# Patient Record
Sex: MALE | Race: WHITE | NOT HISPANIC OR LATINO | Employment: OTHER | URBAN - METROPOLITAN AREA
[De-identification: names, ages, dates, MRNs, and addresses within clinical notes are randomized per-mention and may not be internally consistent; named-entity substitution may affect disease eponyms.]

---

## 2017-03-20 RX ORDER — NEBIVOLOL HYDROCHLORIDE 5 MG/1
TABLET ORAL
Qty: 90 TABLET | Refills: 0 | OUTPATIENT
Start: 2017-03-20

## 2017-04-03 ENCOUNTER — OFFICE VISIT (OUTPATIENT)
Dept: FAMILY MEDICINE CLINIC | Facility: CLINIC | Age: 62
End: 2017-04-03

## 2017-04-03 VITALS
RESPIRATION RATE: 16 BRPM | BODY MASS INDEX: 28.95 KG/M2 | SYSTOLIC BLOOD PRESSURE: 140 MMHG | DIASTOLIC BLOOD PRESSURE: 82 MMHG | HEIGHT: 68 IN | TEMPERATURE: 98.1 F | WEIGHT: 191 LBS | HEART RATE: 58 BPM

## 2017-04-03 DIAGNOSIS — L30.8 OTHER ECZEMA: ICD-10-CM

## 2017-04-03 DIAGNOSIS — Z13.9 SCREENING: ICD-10-CM

## 2017-04-03 DIAGNOSIS — E78.2 MIXED HYPERLIPIDEMIA: ICD-10-CM

## 2017-04-03 DIAGNOSIS — I10 ESSENTIAL HYPERTENSION: Primary | ICD-10-CM

## 2017-04-03 PROCEDURE — 99214 OFFICE O/P EST MOD 30 MIN: CPT | Performed by: INTERNAL MEDICINE

## 2017-04-03 RX ORDER — NEBIVOLOL 5 MG/1
5 TABLET ORAL DAILY
Qty: 90 TABLET | Refills: 1 | Status: SHIPPED | OUTPATIENT
Start: 2017-04-03 | End: 2017-09-30 | Stop reason: SDUPTHER

## 2017-04-03 RX ORDER — GABAPENTIN 300 MG/1
300 CAPSULE ORAL DAILY
Qty: 90 CAPSULE | Refills: 1 | Status: SHIPPED | OUTPATIENT
Start: 2017-04-03 | End: 2018-11-19 | Stop reason: SDUPTHER

## 2017-04-03 RX ORDER — CLOBETASOL PROPIONATE 0.5 MG/ML
LOTION TOPICAL 2 TIMES DAILY
Qty: 118 G | Refills: 2 | Status: SHIPPED | OUTPATIENT
Start: 2017-04-03 | End: 2017-06-28

## 2017-04-03 NOTE — PROGRESS NOTES
Subjective  chief complaint is follow-up for hypertension.  Gelacio Cunningham is a 61 y.o. male.     History of Present Illness   Gelacio is here today for follow-up on his hypertension.  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  Also since his last visit his rheumatology has reduced his medications.  He is feeling well.  He is not having any headaches, dizziness, chest pain, or shortness of breath.  He is complaining of some itching across his upper chest and shoulders.  He has since tried some over-the-counter antiaging medication which has not seemed to help.  This been going on for several weeks.  The following portions of the patient's history were reviewed and updated as appropriate: allergies, current medications, past medical history and problem list.    Review of Systems   Respiratory: Negative for chest tightness and shortness of breath.    Cardiovascular: Negative for chest pain and leg swelling.   Neurological: Negative for dizziness, light-headedness and headaches.       Objective   Physical Exam   Constitutional: He appears well-developed and well-nourished.   Neck: Carotid bruit is not present. No thyromegaly present.   Cardiovascular: Normal rate, regular rhythm, normal heart sounds and intact distal pulses.  Exam reveals no gallop and no friction rub.    No murmur heard.  Blood pressure to my exam is 140/76   Pulmonary/Chest: Effort normal and breath sounds normal. No respiratory distress. He has no wheezes. He has no rales.   Abdominal: Soft. Bowel sounds are normal. He exhibits no distension. There is no tenderness. There is no rebound and no guarding.   Musculoskeletal: He exhibits no edema.   Skin:   There appears to be some lichenoid looking lesions on his shoulders and chest.  This may be lichenified eczema versus lichen planus.  I am going to give him some clobetasol lotion to see if that will help.   Nursing note and vitals reviewed.      Assessment/Plan   Gelacio was seen today for  med management.    Diagnoses and all orders for this visit:    Essential hypertension    Other eczema    Screening  -     PSA    Mixed hyperlipidemia  -     Lipid Panel    Other orders  -     gabapentin (NEURONTIN) 300 MG capsule; Take 1 capsule by mouth Daily.  -     nebivolol (BYSTOLIC) 5 MG tablet; Take 1 tablet by mouth Daily.  -     clobetasol (CLOBEX) 0.05 % lotion; Apply  topically 2 (Two) Times a Day.     Gelacio is here today for follow-up.  His blood pressure seems to be doing well.  I did prescribe some clobetasol for his eczema type reaction.  I am going to check some lab work to look his cholesterol and PSA.

## 2017-04-04 LAB
CHOLEST SERPL-MCNC: 203 MG/DL (ref 0–200)
HDLC SERPL-MCNC: 66 MG/DL (ref 40–60)
INTERPRETATION: NORMAL
LDLC SERPL CALC-MCNC: 115 MG/DL (ref 0–100)
Lab: NORMAL
PSA SERPL-MCNC: 0.49 NG/ML (ref 0–4)
TRIGL SERPL-MCNC: 110 MG/DL (ref 0–150)
VLDLC SERPL CALC-MCNC: 22 MG/DL (ref 5–40)

## 2017-06-28 ENCOUNTER — OFFICE VISIT (OUTPATIENT)
Dept: FAMILY MEDICINE CLINIC | Facility: CLINIC | Age: 62
End: 2017-06-28

## 2017-06-28 VITALS
WEIGHT: 183 LBS | DIASTOLIC BLOOD PRESSURE: 80 MMHG | SYSTOLIC BLOOD PRESSURE: 122 MMHG | BODY MASS INDEX: 27.74 KG/M2 | TEMPERATURE: 98 F | OXYGEN SATURATION: 99 % | HEART RATE: 75 BPM | HEIGHT: 68 IN

## 2017-06-28 DIAGNOSIS — J01.90 ACUTE SINUSITIS, RECURRENCE NOT SPECIFIED, UNSPECIFIED LOCATION: Primary | ICD-10-CM

## 2017-06-28 DIAGNOSIS — C44.91 BASAL CELL CARCINOMA: ICD-10-CM

## 2017-06-28 PROCEDURE — 99214 OFFICE O/P EST MOD 30 MIN: CPT | Performed by: INTERNAL MEDICINE

## 2017-06-28 RX ORDER — MONTELUKAST SODIUM 10 MG/1
10 TABLET ORAL NIGHTLY
Qty: 30 TABLET | Refills: 1 | Status: SHIPPED | OUTPATIENT
Start: 2017-06-28 | End: 2019-06-13

## 2017-06-28 RX ORDER — DOXYCYCLINE HYCLATE 100 MG/1
100 CAPSULE ORAL 2 TIMES DAILY
Qty: 20 CAPSULE | Refills: 0 | Status: SHIPPED | OUTPATIENT
Start: 2017-06-28 | End: 2018-07-31

## 2017-06-28 RX ORDER — FLUNISOLIDE 0.25 MG/ML
2 SOLUTION NASAL EVERY 12 HOURS
Qty: 25 ML | Refills: 1 | Status: SHIPPED | OUTPATIENT
Start: 2017-06-28 | End: 2019-06-13

## 2017-06-28 RX ORDER — MULTIVITAMIN WITH IRON
100 TABLET ORAL DAILY
COMMUNITY
End: 2021-05-28

## 2017-06-28 NOTE — PROGRESS NOTES
Subjective chief complaint is nasal congestion  Gelacio Cunningham is a 62 y.o. male.     History of Present Illness   Gelacio is here today for one-week history of moderate to severe nasal congestion and cough and chills.  He has not had any fever associated with the chills.  His cough is productive of yellow or green sputum.   Past medical history is remarkable for hypertension and rheumatoid arthritis.  He is no longer on immunosuppressive medications other than the Humira.  He is on bi-systolic for his hypertension.  The following portions of the patient's history were reviewed and updated as appropriate: allergies, current medications, past medical history and problem list.    Review of Systems   Constitutional: Positive for chills. Negative for fever.   HENT: Positive for congestion, postnasal drip, rhinorrhea, sinus pressure and sore throat. Negative for ear discharge and ear pain.    Respiratory: Positive for cough.        Objective   Physical Exam   HENT:   Tympanic membranes are normal.  There is bilateral nasal congestion and erythema.  There is posterior pharyngeal erythema but no exudate   Cardiovascular: Normal rate and regular rhythm.    Pulmonary/Chest: Effort normal and breath sounds normal.   Lymphadenopathy:     He has no cervical adenopathy.   Skin:   Ears to have a basal cell skin cancer on the tragus of the right ear       Assessment/Plan   Gelacio was seen today for nasal congestion, cough and chills.    Diagnoses and all orders for this visit:    Acute sinusitis, recurrence not specified, unspecified location    Basal cell carcinoma  -     Ambulatory Referral to Dermatology    Other orders  -     montelukast (SINGULAIR) 10 MG tablet; Take 1 tablet by mouth Every Night.  -     flunisolide (NASALIDE) 25 MCG/ACT (0.025%) solution nasal spray; Inhale 2 sprays Every 12 (Twelve) Hours.  -     doxycycline (VIBRAMYCIN) 100 MG capsule; Take 1 capsule by mouth 2 (Two) Times a Day.        Gelacio is here  today for evaluation of respiratory symptoms.  We are going to treat him for some sinusitis.  I am going to refer him to a dermatologist for his trouble basal cell.

## 2017-07-19 ENCOUNTER — APPOINTMENT (OUTPATIENT)
Dept: GENERAL RADIOLOGY | Facility: HOSPITAL | Age: 62
End: 2017-07-19

## 2017-07-19 ENCOUNTER — HOSPITAL ENCOUNTER (EMERGENCY)
Facility: HOSPITAL | Age: 62
Discharge: HOME | End: 2017-07-19
Attending: FAMILY MEDICINE | Admitting: FAMILY MEDICINE

## 2017-07-19 VITALS
HEIGHT: 68 IN | DIASTOLIC BLOOD PRESSURE: 78 MMHG | HEART RATE: 57 BPM | RESPIRATION RATE: 16 BRPM | OXYGEN SATURATION: 97 % | BODY MASS INDEX: 27.28 KG/M2 | TEMPERATURE: 96.5 F | WEIGHT: 180 LBS | SYSTOLIC BLOOD PRESSURE: 129 MMHG

## 2017-07-19 DIAGNOSIS — M70.50 PES ANSERINE BURSITIS: Primary | ICD-10-CM

## 2017-07-19 PROCEDURE — 73560 X-RAY EXAM OF KNEE 1 OR 2: CPT

## 2017-07-19 PROCEDURE — 99283 EMERGENCY DEPT VISIT LOW MDM: CPT

## 2017-07-19 RX ORDER — OXYCODONE HYDROCHLORIDE AND ACETAMINOPHEN 5; 325 MG/1; MG/1
1 TABLET ORAL EVERY 4 HOURS PRN
Qty: 15 TABLET | Refills: 0 | Status: SHIPPED | OUTPATIENT
Start: 2017-07-19 | End: 2018-07-31

## 2017-07-19 NOTE — ED PROVIDER NOTES
" EMERGENCY DEPARTMENT ENCOUNTER    CHIEF COMPLAINT  Chief Complaint: Right knee pain  History given by: Patient  History limited by: Nothing  Room Number: 14/14  PMD: Favio Guaman MD      HPI:  Pt is a 62 y.o. male who presents complaining of right knee pain onset last night. The pt states he started to have tightness in his right knee while sitting down last night. The pt states the pain started to worsen when he went to sleep. The pt states he has tried ice and heat, but neither have worked. The pt states the pain feels similar to his rheumatoid arthritis in his fingers. The pt's last dose of Humira was 4 days ago.    Duration: Since last night  Onset: Gradual  Timing: Constant  Location: Right knee  Radiation: None  Quality: \"Pain\"  Intensity/Severity: Moderate  Progression: Worsening  Associated Symptoms: None  Aggravating Factors: Nothing  Alleviating Factors: Nothing  Previous Episodes: None  Treatment before arrival: Nothing    PAST MEDICAL HISTORY  Active Ambulatory Problems     Diagnosis Date Noted   • Essential hypertension 09/23/2016   • Age-related cognitive decline 09/23/2016   • Encounter for screening colonoscopy 12/23/2016   • Mixed hyperlipidemia 04/03/2017     Resolved Ambulatory Problems     Diagnosis Date Noted   • No Resolved Ambulatory Problems     Past Medical History:   Diagnosis Date   • Arthritis    • Hypertension        PAST SURGICAL HISTORY  Past Surgical History:   Procedure Laterality Date   • COLONOSCOPY N/A 12/23/2016    Procedure: COLONOSCOPY TO CECUM WITH POLYPECTOMY (COLD BIOPSY);  Surgeon: Antolin Munoz Jr., MD;  Location: [facility];  Service:        FAMILY HISTORY  Family History   Problem Relation Age of Onset   • COPD Father    • Cancer Father    • Asthma Brother    • Diabetes Paternal Grandfather        SOCIAL HISTORY  Social History     Social History   • Marital status:      Spouse name: N/A   • Number of children: N/A   • Years of education: N/A "     Occupational History   • Not on file.     Social History Main Topics   • Smoking status: Former Smoker     Quit date: 12/23/2004   • Smokeless tobacco: Not on file   • Alcohol use No   • Drug use: No   • Sexual activity: Not on file     Other Topics Concern   • Not on file     Social History Narrative       ALLERGIES  Sulindac    REVIEW OF SYSTEMS  Review of Systems   Constitutional: Negative for chills and fever.   Respiratory: Negative for cough and shortness of breath.    Cardiovascular: Negative for chest pain and palpitations.   Gastrointestinal: Negative for abdominal pain, nausea and vomiting.   Genitourinary: Negative for dysuria.   Musculoskeletal: Negative for back pain and neck pain.        Right knee pain   Neurological: Negative for syncope, weakness and numbness.   All other systems reviewed and are negative.      PHYSICAL EXAM  ED Triage Vitals   Temp Heart Rate Resp BP SpO2   07/19/17 0456 07/19/17 0453 07/19/17 0453 07/19/17 0634 07/19/17 0453   97 °F (36.1 °C) 69 15 128/89 96 %      Temp src Heart Rate Source Patient Position BP Location FiO2 (%)   07/19/17 0456 07/19/17 0453 07/19/17 0634 07/19/17 0634 --   Tympanic Monitor Lying Right arm        Physical Exam   Constitutional: He is oriented to person, place, and time and well-developed, well-nourished, and in no distress.   HENT:   Head: Normocephalic and atraumatic.   Eyes: EOM are normal.   Neck: Normal range of motion.   Pulmonary/Chest: No respiratory distress.   Musculoskeletal: He exhibits tenderness (Over the proximal fibula).   Neurological: He is alert and oriented to person, place, and time. He has normal sensation and normal strength.   Skin: Skin is warm and dry.   Psychiatric: Affect normal.   Nursing note and vitals reviewed.      LAB RESULTS  Lab Results (last 24 hours)     ** No results found for the last 24 hours. **          I ordered the above labs and reviewed the results    RADIOLOGY  XR Knee 1 or 2 View Right   Final  Result   1. Mild pan compartmental osteoarthritis with spurring at the superior  anterior aspect of the patella.  2. Minimal relative medial compartmental narrowing.  3. No acute nor other significant osseous abnormality.  4. Vascular calcification.        I ordered the above noted radiological studies. Interpreted by radiologist. Reviewed by me in PACS.       PROCEDURES  Procedures      PROGRESS AND CONSULTS  ED Course     0514  XR Right Knee ordered for further evaluation.    0702  BP- 128/89 HR- 61 Temp- 97 °F (36.1 °C) (Tympanic) O2 sat- 95%    Rechecked pt, he is resting comfortably. Discussed the XR results with the pt including the bursa seen on the XR. Discussed the plan to discharge the pt with the plan to take Advil if the pt can tolerate it. Discussed the plan to discharge the pt with pain medication and to take it easy the next several days. I advised the pt to f/u with an orthopedist in 3-5 days if the pain does not improve. The pt understands and agrees with the plan.      MEDICAL DECISION MAKING  Results were reviewed/discussed with the patient and they were also made aware of online access. Pt also made aware that some labs, such as cultures, will not be resulted during ER visit and follow up with PMD is necessary.     MDM  Number of Diagnoses or Management Options  Pes anserine bursitis:      Amount and/or Complexity of Data Reviewed  Tests in the radiology section of CPT®: ordered and reviewed (XR Right Knee - 1. Mild pan compartmental osteoarthritis with spurring at the superior anterior aspect of the patella.  2. Minimal relative medial compartmental narrowing.  3. No acute nor other significant osseous abnormality.  4. Vascular calcification.)    Patient Progress  Patient progress: stable         DIAGNOSIS  Final diagnoses:   Pes anserine bursitis       DISPOSITION  DISCHARGE    Patient discharged in stable condition.    Reviewed implications of results, diagnosis, meds, responsibility to follow  up, warning signs and symptoms of possible worsening, potential complications and reasons to return to ER.    Patient/Family voiced understanding of above instructions.    Discussed plan for discharge, as there is no emergent indication for admission.  Pt/family is agreeable and understands need for follow up and repeat testing.  Pt is aware that discharge does not mean that nothing is wrong but it indicates no emergency is present that requires admission and they must continue care with follow-up as given below or physician of their choice.     FOLLOW-UP  Randolph Rashid MD  [address] [facility]  [address]  [place] [address]  [phone]      If not improving over 3-5 days, see Dr Rashid for a recheck in the office.         Medication List      New Prescriptions          oxyCODONE-acetaminophen 5-325 MG per tablet   Commonly known as:  PERCOCET   Take 1 tablet by mouth Every 4 (Four) Hours As Needed for Moderate Pain    (pain).         Stop          doxycycline 100 MG capsule   Commonly known as:  VIBRAMYCIN       flunisolide 25 MCG/ACT (0.025%) solution nasal spray   Commonly known as:  NASALIDE               Latest Documented Vital Signs:  As of 7:10 AM  BP- 128/89 HR- 61 Temp- 97 °F (36.1 °C) (Tympanic) O2 sat- 95%    --  Documentation assistance provided by robyn Juan for Dr. Ruiz.  Information recorded by the scribe was done at my direction and has been verified and validated by me.     Olaf Juan  07/19/17 5508       Mateo Ruiz MD  07/19/17 5656

## 2017-07-20 ENCOUNTER — TELEPHONE (OUTPATIENT)
Dept: SOCIAL WORK | Facility: HOSPITAL | Age: 62
End: 2017-07-20

## 2017-09-30 RX ORDER — NEBIVOLOL HYDROCHLORIDE 5 MG/1
TABLET ORAL
Qty: 90 TABLET | Refills: 1 | Status: SHIPPED | OUTPATIENT
Start: 2017-09-30 | End: 2018-05-09 | Stop reason: SDUPTHER

## 2018-05-09 RX ORDER — NEBIVOLOL HYDROCHLORIDE 5 MG/1
TABLET ORAL
Qty: 90 TABLET | Refills: 1 | Status: SHIPPED | OUTPATIENT
Start: 2018-05-09 | End: 2018-11-19 | Stop reason: SDUPTHER

## 2018-07-31 ENCOUNTER — TELEPHONE (OUTPATIENT)
Dept: FAMILY MEDICINE CLINIC | Facility: CLINIC | Age: 63
End: 2018-07-31

## 2018-08-07 ENCOUNTER — TELEPHONE (OUTPATIENT)
Dept: FAMILY MEDICINE CLINIC | Facility: CLINIC | Age: 63
End: 2018-08-07

## 2018-08-07 NOTE — TELEPHONE ENCOUNTER
Left message with wife about refill for gabapentin and said that Dr Guaman advised that he needs an appt before he can refill it.

## 2018-11-05 RX ORDER — NEBIVOLOL HYDROCHLORIDE 5 MG/1
TABLET ORAL
Qty: 90 TABLET | Refills: 1 | OUTPATIENT
Start: 2018-11-05

## 2018-11-19 ENCOUNTER — OFFICE VISIT (OUTPATIENT)
Dept: FAMILY MEDICINE CLINIC | Facility: CLINIC | Age: 63
End: 2018-11-19

## 2018-11-19 VITALS
SYSTOLIC BLOOD PRESSURE: 140 MMHG | TEMPERATURE: 98 F | DIASTOLIC BLOOD PRESSURE: 80 MMHG | HEART RATE: 78 BPM | WEIGHT: 189 LBS | BODY MASS INDEX: 28.64 KG/M2 | HEIGHT: 68 IN | OXYGEN SATURATION: 99 %

## 2018-11-19 DIAGNOSIS — R21 SCROTAL RASH: ICD-10-CM

## 2018-11-19 DIAGNOSIS — I10 ESSENTIAL HYPERTENSION: Primary | ICD-10-CM

## 2018-11-19 PROBLEM — M05.89 OTHER RHEUMATOID ARTHRITIS WITH RHEUMATOID FACTOR OF MULTIPLE SITES (HCC): Status: ACTIVE | Noted: 2018-05-17

## 2018-11-19 PROCEDURE — 99213 OFFICE O/P EST LOW 20 MIN: CPT | Performed by: INTERNAL MEDICINE

## 2018-11-19 RX ORDER — GABAPENTIN 300 MG/1
300 CAPSULE ORAL DAILY
Qty: 90 CAPSULE | Refills: 1 | Status: SHIPPED | OUTPATIENT
Start: 2018-11-19 | End: 2019-10-21 | Stop reason: SDUPTHER

## 2018-11-19 RX ORDER — NEBIVOLOL 5 MG/1
5 TABLET ORAL DAILY
Qty: 90 TABLET | Refills: 1 | Status: SHIPPED | OUTPATIENT
Start: 2018-11-19 | End: 2019-05-18 | Stop reason: SDUPTHER

## 2018-11-19 RX ORDER — CLOTRIMAZOLE AND BETAMETHASONE DIPROPIONATE 10; .64 MG/G; MG/G
CREAM TOPICAL 2 TIMES DAILY
Qty: 45 G | Refills: 1 | Status: SHIPPED | OUTPATIENT
Start: 2018-11-19 | End: 2019-02-05

## 2018-11-19 NOTE — PROGRESS NOTES
Subjective complaint is medication refills for what pressure and neuropathy  Gelacio Cunningham is a 63 y.o. male.     History of Present Illness   Gelacio is here today for medication refill on his Bystolic.  This has controlled his blood pressure fairly well.  He also is on some gabapentin for neuropathic pain.  He is only taking one daily.  He does need a refill on this.  The only other complaint is a rash on his scrotum.  This is been present for several weeks.  He has been using some Lotrimin which helps with itching but the rash is still present.  He did have a question about the new shingles vaccine.  The following portions of the patient's history were reviewed and updated as appropriate: allergies, current medications, past medical history and problem list.    Review of Systems   Respiratory: Negative for shortness of breath.    Cardiovascular: Negative for chest pain.   Skin: Positive for rash.   Neurological: Negative for dizziness and headache.       Objective   Physical Exam   Constitutional: He appears well-developed and well-nourished.   Neck: Carotid bruit is not present.   Cardiovascular: Normal rate, regular rhythm, normal heart sounds and intact distal pulses. Exam reveals no gallop and no friction rub.   No murmur heard.  Pulmonary/Chest: Effort normal and breath sounds normal. No respiratory distress. He has no wheezes. He has no rales.   Abdominal: Soft. Bowel sounds are normal. He exhibits no distension and no mass. There is no tenderness. There is no guarding.   Musculoskeletal: He exhibits no edema.   Skin:   There is some erythema and irritation of the scrotal side.  There does not really appear to be any significant scale.   Nursing note and vitals reviewed.        Assessment/Plan   Gelacio was seen today for med refill and rash.    Diagnoses and all orders for this visit:    Essential hypertension    Scrotal rash    Other orders  -     nebivolol (BYSTOLIC) 5 MG tablet; Take 1 tablet by mouth  Daily.  -     gabapentin (NEURONTIN) 300 MG capsule; Take 1 capsule by mouth Daily.  -     clotrimazole-betamethasone (LOTRISONE) 1-0.05 % cream; Apply  topically to the appropriate area as directed 2 (Two) Times a Day.      Gelacio is here today for follow-up.  We are going to renew his medications.  I did give him some Lotrisone for the rash.  He is going to check with his rheumatologist about the shingles vaccine because he is on immunosuppressive medication.

## 2019-01-23 ENCOUNTER — OFFICE VISIT (OUTPATIENT)
Dept: FAMILY MEDICINE CLINIC | Facility: CLINIC | Age: 64
End: 2019-01-23

## 2019-01-23 VITALS
SYSTOLIC BLOOD PRESSURE: 130 MMHG | BODY MASS INDEX: 29.1 KG/M2 | HEART RATE: 62 BPM | TEMPERATURE: 98.5 F | HEIGHT: 68 IN | WEIGHT: 192 LBS | OXYGEN SATURATION: 98 % | DIASTOLIC BLOOD PRESSURE: 98 MMHG

## 2019-01-23 DIAGNOSIS — R10.11 ABDOMINAL PAIN, RIGHT UPPER QUADRANT: Primary | ICD-10-CM

## 2019-01-23 LAB
ALBUMIN SERPL-MCNC: 4.4 G/DL (ref 3.5–5.2)
ALBUMIN/GLOB SERPL: 1.9 G/DL
ALP SERPL-CCNC: 141 U/L (ref 39–117)
ALT SERPL-CCNC: 59 U/L (ref 1–41)
AMYLASE SERPL-CCNC: 44 U/L (ref 28–100)
AST SERPL-CCNC: 50 U/L (ref 1–40)
BASOPHILS # BLD AUTO: 0.02 10*3/MM3 (ref 0–0.2)
BASOPHILS NFR BLD AUTO: 0.2 % (ref 0–1.5)
BILIRUB SERPL-MCNC: 0.6 MG/DL (ref 0.1–1.2)
BUN SERPL-MCNC: 14 MG/DL (ref 8–23)
BUN/CREAT SERPL: 14.9 (ref 7–25)
CALCIUM SERPL-MCNC: 9.4 MG/DL (ref 8.6–10.5)
CHLORIDE SERPL-SCNC: 98 MMOL/L (ref 98–107)
CO2 SERPL-SCNC: 28.2 MMOL/L (ref 22–29)
CREAT SERPL-MCNC: 0.94 MG/DL (ref 0.76–1.27)
EOSINOPHIL # BLD AUTO: 0.05 10*3/MM3 (ref 0–0.7)
EOSINOPHIL NFR BLD AUTO: 0.5 % (ref 0.3–6.2)
ERYTHROCYTE [DISTWIDTH] IN BLOOD BY AUTOMATED COUNT: 14 % (ref 11.5–14.5)
GLOBULIN SER CALC-MCNC: 2.3 GM/DL
GLUCOSE SERPL-MCNC: 88 MG/DL (ref 65–99)
HCT VFR BLD AUTO: 39.8 % (ref 40.4–52.2)
HGB BLD-MCNC: 12.9 G/DL (ref 13.7–17.6)
IMM GRANULOCYTES # BLD AUTO: 0.04 10*3/MM3 (ref 0–0.03)
IMM GRANULOCYTES NFR BLD AUTO: 0.4 % (ref 0–0.5)
LIPASE SERPL-CCNC: 21 U/L (ref 13–60)
LYMPHOCYTES # BLD AUTO: 1.05 10*3/MM3 (ref 0.9–4.8)
LYMPHOCYTES NFR BLD AUTO: 10.7 % (ref 19.6–45.3)
MCH RBC QN AUTO: 29.2 PG (ref 27–32.7)
MCHC RBC AUTO-ENTMCNC: 32.4 G/DL (ref 32.6–36.4)
MCV RBC AUTO: 90 FL (ref 79.8–96.2)
MONOCYTES # BLD AUTO: 0.86 10*3/MM3 (ref 0.2–1.2)
MONOCYTES NFR BLD AUTO: 8.8 % (ref 5–12)
NEUTROPHILS # BLD AUTO: 7.82 10*3/MM3 (ref 1.9–8.1)
NEUTROPHILS NFR BLD AUTO: 79.8 % (ref 42.7–76)
PLATELET # BLD AUTO: 332 10*3/MM3 (ref 140–500)
POTASSIUM SERPL-SCNC: 4.2 MMOL/L (ref 3.5–5.2)
PROT SERPL-MCNC: 6.7 G/DL (ref 6–8.5)
RBC # BLD AUTO: 4.42 10*6/MM3 (ref 4.6–6)
SODIUM SERPL-SCNC: 138 MMOL/L (ref 136–145)
WBC # BLD AUTO: 9.8 10*3/MM3 (ref 4.5–10.7)

## 2019-01-23 PROCEDURE — 99214 OFFICE O/P EST MOD 30 MIN: CPT | Performed by: INTERNAL MEDICINE

## 2019-01-23 RX ORDER — OMEPRAZOLE 40 MG/1
40 CAPSULE, DELAYED RELEASE ORAL DAILY
Qty: 30 CAPSULE | Refills: 1 | Status: SHIPPED | OUTPATIENT
Start: 2019-01-23 | End: 2019-01-24

## 2019-01-23 NOTE — PROGRESS NOTES
Subjective chief complaint is right sided abdominal pain  Gelacio Cunningham is a 63 y.o. male.     History of Present Illness   Gelacio is here today for complaints of pain in the right upper quadrant of his abdomen.  This began several days ago as a sense of feeling full or that his stomach was not emptying..  He has been using some Tums and antacids but these  are not helping.  Today the right upper quadrant pain became more severe.  He is not having fever or chills.  He is not having any blood in his bowel movements or dark bowel movements.  He has no prior history of ulcer disease.  He is not on any anti-inflammatories.  The following portions of the patient's history were reviewed and updated as appropriate: allergies, current medications, past medical history and problem list.    Review of Systems   Constitutional: Negative for chills and fever.   Respiratory: Negative for cough, chest tightness and shortness of breath.    Cardiovascular: Negative for chest pain.   Gastrointestinal: Positive for abdominal pain and nausea. Negative for blood in stool and vomiting.       Objective   Physical Exam   Constitutional: He appears well-developed and well-nourished.   Cardiovascular: Normal rate, regular rhythm and normal heart sounds.   Pulmonary/Chest: Breath sounds normal. No respiratory distress. He has no wheezes. He has no rales.   He does have some pain in the right upper quadrant with deep inspiration.   Abdominal: Soft. Bowel sounds are normal. He exhibits no distension and no mass. There is tenderness. There is guarding. There is no rebound.   He is tender in the right upper quadrant with a positive Cunningham's sign.   Nursing note and vitals reviewed.        Assessment/Plan   Gelacio was seen today for abdominal pain.    Diagnoses and all orders for this visit:    Abdominal pain, right upper quadrant  -     CBC & Differential  -     Comprehensive Metabolic Panel  -     Amylase  -     Lipase  -     US Gallbladder;  Future  -     NM HIDA scan with pharmacological intervention; Future    Other orders  -     omeprazole (PRILOSEC) 40 MG capsule; Take 1 capsule by mouth Daily.      Gelacio is here today for right upper quadrant pain.  I suspect this is going to be a gallbladder issue.  I am going to get a CBC chemistry panel amylase and lipase.  I'm going to put him on some omeprazole.  I have advised him to go to a clear liquid diet for the next 48 hours.  I'm going to try to get a fairly expeditious ultrasound.  However if his symptoms worsen he may need to go to the emergency room.  I did advise him to do this.

## 2019-01-24 ENCOUNTER — TELEPHONE (OUTPATIENT)
Dept: FAMILY MEDICINE CLINIC | Facility: CLINIC | Age: 64
End: 2019-01-24

## 2019-01-24 ENCOUNTER — APPOINTMENT (OUTPATIENT)
Dept: CT IMAGING | Facility: HOSPITAL | Age: 64
End: 2019-01-24

## 2019-01-24 ENCOUNTER — HOSPITAL ENCOUNTER (OUTPATIENT)
Dept: ULTRASOUND IMAGING | Facility: HOSPITAL | Age: 64
Discharge: HOME | End: 2019-01-24
Attending: INTERNAL MEDICINE | Admitting: INTERNAL MEDICINE

## 2019-01-24 ENCOUNTER — HOSPITAL ENCOUNTER (INPATIENT)
Facility: HOSPITAL | Age: 64
LOS: 5 days | Discharge: HOME | End: 2019-01-29
Attending: EMERGENCY MEDICINE | Admitting: HOSPITALIST

## 2019-01-24 DIAGNOSIS — Z85.820 HISTORY OF MELANOMA: ICD-10-CM

## 2019-01-24 DIAGNOSIS — C80.1: Primary | ICD-10-CM

## 2019-01-24 DIAGNOSIS — R16.0 LIVER MASSES: ICD-10-CM

## 2019-01-24 DIAGNOSIS — R10.11 ABDOMINAL PAIN, RIGHT UPPER QUADRANT: ICD-10-CM

## 2019-01-24 DIAGNOSIS — R10.11 RIGHT UPPER QUADRANT PAIN: ICD-10-CM

## 2019-01-24 LAB
ALBUMIN SERPL-MCNC: 4.2 G/DL (ref 3.5–5.2)
ALBUMIN/GLOB SERPL: 1.1 G/DL
ALP SERPL-CCNC: 162 U/L (ref 39–117)
ALT SERPL W P-5'-P-CCNC: 66 U/L (ref 1–41)
ANION GAP SERPL CALCULATED.3IONS-SCNC: 12.4 MMOL/L
AST SERPL-CCNC: 49 U/L (ref 1–40)
BASOPHILS # BLD AUTO: 0.01 10*3/MM3 (ref 0–0.2)
BASOPHILS NFR BLD AUTO: 0.1 % (ref 0–1.5)
BILIRUB SERPL-MCNC: 0.6 MG/DL (ref 0.1–1.2)
BILIRUB UR QL STRIP: NEGATIVE
BUN BLD-MCNC: 12 MG/DL (ref 8–23)
BUN/CREAT SERPL: 11.3 (ref 7–25)
CALCIUM SPEC-SCNC: 9.3 MG/DL (ref 8.6–10.5)
CHLORIDE SERPL-SCNC: 99 MMOL/L (ref 98–107)
CLARITY UR: CLEAR
CO2 SERPL-SCNC: 27.6 MMOL/L (ref 22–29)
COLOR UR: YELLOW
CREAT BLD-MCNC: 1.06 MG/DL (ref 0.76–1.27)
DEPRECATED RDW RBC AUTO: 45.3 FL (ref 37–54)
EOSINOPHIL # BLD AUTO: 0.16 10*3/MM3 (ref 0–0.7)
EOSINOPHIL NFR BLD AUTO: 1.8 % (ref 0.3–6.2)
ERYTHROCYTE [DISTWIDTH] IN BLOOD BY AUTOMATED COUNT: 13.9 % (ref 11.5–14.5)
GFR SERPL CREATININE-BSD FRML MDRD: 71 ML/MIN/1.73
GLOBULIN UR ELPH-MCNC: 3.7 GM/DL
GLUCOSE BLD-MCNC: 71 MG/DL (ref 65–99)
GLUCOSE UR STRIP-MCNC: NEGATIVE MG/DL
HCT VFR BLD AUTO: 42.6 % (ref 40.4–52.2)
HGB BLD-MCNC: 13.9 G/DL (ref 13.7–17.6)
HGB UR QL STRIP.AUTO: NEGATIVE
HOLD SPECIMEN: NORMAL
IMM GRANULOCYTES # BLD AUTO: 0.02 10*3/MM3 (ref 0–0.03)
IMM GRANULOCYTES NFR BLD AUTO: 0.2 % (ref 0–0.5)
KETONES UR QL STRIP: ABNORMAL
LEUKOCYTE ESTERASE UR QL STRIP.AUTO: NEGATIVE
LIPASE SERPL-CCNC: 20 U/L (ref 13–60)
LYMPHOCYTES # BLD AUTO: 1.56 10*3/MM3 (ref 0.9–4.8)
LYMPHOCYTES NFR BLD AUTO: 17.8 % (ref 19.6–45.3)
MCH RBC QN AUTO: 28.8 PG (ref 27–32.7)
MCHC RBC AUTO-ENTMCNC: 32.6 G/DL (ref 32.6–36.4)
MCV RBC AUTO: 88.4 FL (ref 79.8–96.2)
MONOCYTES # BLD AUTO: 1.2 10*3/MM3 (ref 0.2–1.2)
MONOCYTES NFR BLD AUTO: 13.7 % (ref 5–12)
NEUTROPHILS # BLD AUTO: 5.83 10*3/MM3 (ref 1.9–8.1)
NEUTROPHILS NFR BLD AUTO: 66.6 % (ref 42.7–76)
NITRITE UR QL STRIP: NEGATIVE
PH UR STRIP.AUTO: <=5 [PH] (ref 5–8)
PLATELET # BLD AUTO: 343 10*3/MM3 (ref 140–500)
PMV BLD AUTO: 10.6 FL (ref 6–12)
POTASSIUM BLD-SCNC: 3.4 MMOL/L (ref 3.5–5.2)
PROT SERPL-MCNC: 7.9 G/DL (ref 6–8.5)
PROT UR QL STRIP: NEGATIVE
RBC # BLD AUTO: 4.82 10*6/MM3 (ref 4.6–6)
SODIUM BLD-SCNC: 139 MMOL/L (ref 136–145)
SP GR UR STRIP: >=1.03 (ref 1–1.03)
UROBILINOGEN UR QL STRIP: ABNORMAL
WBC NRBC COR # BLD: 8.76 10*3/MM3 (ref 4.5–10.7)
WHOLE BLOOD HOLD SPECIMEN: NORMAL

## 2019-01-24 PROCEDURE — 80053 COMPREHEN METABOLIC PANEL: CPT | Performed by: NURSE PRACTITIONER

## 2019-01-24 PROCEDURE — G0378 HOSPITAL OBSERVATION PER HR: HCPCS

## 2019-01-24 PROCEDURE — 81003 URINALYSIS AUTO W/O SCOPE: CPT | Performed by: NURSE PRACTITIONER

## 2019-01-24 PROCEDURE — 99284 EMERGENCY DEPT VISIT MOD MDM: CPT

## 2019-01-24 PROCEDURE — 83690 ASSAY OF LIPASE: CPT | Performed by: NURSE PRACTITIONER

## 2019-01-24 PROCEDURE — 25010000002 HYDROMORPHONE 1 MG/ML SOLUTION: Performed by: NURSE PRACTITIONER

## 2019-01-24 PROCEDURE — 85025 COMPLETE CBC W/AUTO DIFF WBC: CPT | Performed by: NURSE PRACTITIONER

## 2019-01-24 PROCEDURE — 25010000002 IOPAMIDOL 61 % SOLUTION: Performed by: NURSE PRACTITIONER

## 2019-01-24 PROCEDURE — 74177 CT ABD & PELVIS W/CONTRAST: CPT

## 2019-01-24 PROCEDURE — 76705 ECHO EXAM OF ABDOMEN: CPT

## 2019-01-24 PROCEDURE — 25010000002 ONDANSETRON PER 1 MG: Performed by: NURSE PRACTITIONER

## 2019-01-24 RX ORDER — LANOLIN ALCOHOL/MO/W.PET/CERES
100 CREAM (GRAM) TOPICAL DAILY
Status: DISCONTINUED | OUTPATIENT
Start: 2019-01-25 | End: 2019-01-29 | Stop reason: HOSPADM

## 2019-01-24 RX ORDER — ONDANSETRON 2 MG/ML
4 INJECTION INTRAMUSCULAR; INTRAVENOUS EVERY 6 HOURS PRN
Status: DISCONTINUED | OUTPATIENT
Start: 2019-01-24 | End: 2019-01-29 | Stop reason: HOSPADM

## 2019-01-24 RX ORDER — SODIUM CHLORIDE 0.9 % (FLUSH) 0.9 %
10 SYRINGE (ML) INJECTION AS NEEDED
Status: DISCONTINUED | OUTPATIENT
Start: 2019-01-24 | End: 2019-01-29 | Stop reason: HOSPADM

## 2019-01-24 RX ORDER — ONDANSETRON 2 MG/ML
4 INJECTION INTRAMUSCULAR; INTRAVENOUS ONCE
Status: COMPLETED | OUTPATIENT
Start: 2019-01-24 | End: 2019-01-24

## 2019-01-24 RX ORDER — HYDROMORPHONE HYDROCHLORIDE 1 MG/ML
0.5 INJECTION, SOLUTION INTRAMUSCULAR; INTRAVENOUS; SUBCUTANEOUS
Status: DISCONTINUED | OUTPATIENT
Start: 2019-01-24 | End: 2019-01-29 | Stop reason: HOSPADM

## 2019-01-24 RX ORDER — SODIUM CHLORIDE 0.9 % (FLUSH) 0.9 %
3-10 SYRINGE (ML) INJECTION AS NEEDED
Status: DISCONTINUED | OUTPATIENT
Start: 2019-01-24 | End: 2019-01-29 | Stop reason: HOSPADM

## 2019-01-24 RX ORDER — ACETAMINOPHEN 325 MG/1
650 TABLET ORAL EVERY 4 HOURS PRN
Status: DISCONTINUED | OUTPATIENT
Start: 2019-01-24 | End: 2019-01-29 | Stop reason: HOSPADM

## 2019-01-24 RX ORDER — MONTELUKAST SODIUM 10 MG/1
10 TABLET ORAL NIGHTLY
Status: DISCONTINUED | OUTPATIENT
Start: 2019-01-24 | End: 2019-01-29 | Stop reason: HOSPADM

## 2019-01-24 RX ORDER — NEBIVOLOL 5 MG/1
5 TABLET ORAL DAILY
Status: DISCONTINUED | OUTPATIENT
Start: 2019-01-25 | End: 2019-01-29 | Stop reason: HOSPADM

## 2019-01-24 RX ORDER — SODIUM CHLORIDE 0.9 % (FLUSH) 0.9 %
3 SYRINGE (ML) INJECTION EVERY 12 HOURS SCHEDULED
Status: DISCONTINUED | OUTPATIENT
Start: 2019-01-24 | End: 2019-01-29 | Stop reason: HOSPADM

## 2019-01-24 RX ORDER — FOLIC ACID 1 MG/1
1 TABLET ORAL DAILY
Status: DISCONTINUED | OUTPATIENT
Start: 2019-01-25 | End: 2019-01-29 | Stop reason: HOSPADM

## 2019-01-24 RX ORDER — CLOTRIMAZOLE AND BETAMETHASONE DIPROPIONATE 10; .64 MG/G; MG/G
CREAM TOPICAL 2 TIMES DAILY
Status: DISCONTINUED | OUTPATIENT
Start: 2019-01-25 | End: 2019-01-29 | Stop reason: HOSPADM

## 2019-01-24 RX ORDER — GABAPENTIN 300 MG/1
300 CAPSULE ORAL NIGHTLY
Status: DISCONTINUED | OUTPATIENT
Start: 2019-01-24 | End: 2019-01-29 | Stop reason: HOSPADM

## 2019-01-24 RX ORDER — SODIUM CHLORIDE AND POTASSIUM CHLORIDE 150; 900 MG/100ML; MG/100ML
75 INJECTION, SOLUTION INTRAVENOUS CONTINUOUS
Status: DISCONTINUED | OUTPATIENT
Start: 2019-01-24 | End: 2019-01-26

## 2019-01-24 RX ORDER — FLUTICASONE PROPIONATE 50 MCG
2 SPRAY, SUSPENSION (ML) NASAL DAILY
Status: DISCONTINUED | OUTPATIENT
Start: 2019-01-25 | End: 2019-01-29 | Stop reason: HOSPADM

## 2019-01-24 RX ORDER — MULTIPLE VITAMINS W/ MINERALS TAB 9MG-400MCG
1 TAB ORAL DAILY
COMMUNITY

## 2019-01-24 RX ADMIN — IOPAMIDOL 85 ML: 612 INJECTION, SOLUTION INTRAVENOUS at 17:39

## 2019-01-24 RX ADMIN — SODIUM CHLORIDE 500 ML: 9 INJECTION, SOLUTION INTRAVENOUS at 16:36

## 2019-01-24 RX ADMIN — ONDANSETRON 4 MG: 2 INJECTION INTRAMUSCULAR; INTRAVENOUS at 16:40

## 2019-01-24 RX ADMIN — HYDROMORPHONE HYDROCHLORIDE 1 MG: 1 INJECTION, SOLUTION INTRAMUSCULAR; INTRAVENOUS; SUBCUTANEOUS at 16:40

## 2019-01-24 NOTE — TELEPHONE ENCOUNTER
Spoke to patient's wife and she said they just had the ultrasound of his gallbladder done and he is in bad pain.  Didn't know if he should wait for results of U/S or go to the ER.  I advised to go to ER if he was in that much pain.

## 2019-01-25 ENCOUNTER — APPOINTMENT (OUTPATIENT)
Dept: CT IMAGING | Facility: HOSPITAL | Age: 64
End: 2019-01-25

## 2019-01-25 LAB
ALBUMIN SERPL-MCNC: 3.8 G/DL (ref 3.5–5.2)
ALBUMIN/GLOB SERPL: 1.2 G/DL
ALP SERPL-CCNC: 137 U/L (ref 39–117)
ALT SERPL W P-5'-P-CCNC: 56 U/L (ref 1–41)
ANION GAP SERPL CALCULATED.3IONS-SCNC: 10.2 MMOL/L
AST SERPL-CCNC: 42 U/L (ref 1–40)
BASOPHILS # BLD AUTO: 0.01 10*3/MM3 (ref 0–0.2)
BASOPHILS NFR BLD AUTO: 0.1 % (ref 0–1.5)
BILIRUB SERPL-MCNC: 0.6 MG/DL (ref 0.1–1.2)
BUN BLD-MCNC: 12 MG/DL (ref 8–23)
BUN/CREAT SERPL: 12.8 (ref 7–25)
CALCIUM SPEC-SCNC: 9 MG/DL (ref 8.6–10.5)
CEA SERPL-MCNC: 1.4 NG/ML
CHLORIDE SERPL-SCNC: 104 MMOL/L (ref 98–107)
CO2 SERPL-SCNC: 27.8 MMOL/L (ref 22–29)
CREAT BLD-MCNC: 0.94 MG/DL (ref 0.76–1.27)
DEPRECATED RDW RBC AUTO: 45.8 FL (ref 37–54)
EOSINOPHIL # BLD AUTO: 0.11 10*3/MM3 (ref 0–0.7)
EOSINOPHIL NFR BLD AUTO: 1.6 % (ref 0.3–6.2)
ERYTHROCYTE [DISTWIDTH] IN BLOOD BY AUTOMATED COUNT: 14 % (ref 11.5–14.5)
GFR SERPL CREATININE-BSD FRML MDRD: 82 ML/MIN/1.73
GLOBULIN UR ELPH-MCNC: 3.2 GM/DL
GLUCOSE BLD-MCNC: 103 MG/DL (ref 65–99)
HCT VFR BLD AUTO: 39.3 % (ref 40.4–52.2)
HGB BLD-MCNC: 12.6 G/DL (ref 13.7–17.6)
IMM GRANULOCYTES # BLD AUTO: 0.03 10*3/MM3 (ref 0–0.03)
IMM GRANULOCYTES NFR BLD AUTO: 0.4 % (ref 0–0.5)
INR PPP: 1.18 (ref 0.9–1.1)
LYMPHOCYTES # BLD AUTO: 0.78 10*3/MM3 (ref 0.9–4.8)
LYMPHOCYTES NFR BLD AUTO: 11.6 % (ref 19.6–45.3)
MCH RBC QN AUTO: 28.6 PG (ref 27–32.7)
MCHC RBC AUTO-ENTMCNC: 32.1 G/DL (ref 32.6–36.4)
MCV RBC AUTO: 89.3 FL (ref 79.8–96.2)
MONOCYTES # BLD AUTO: 0.75 10*3/MM3 (ref 0.2–1.2)
MONOCYTES NFR BLD AUTO: 11.1 % (ref 5–12)
NEUTROPHILS # BLD AUTO: 5.09 10*3/MM3 (ref 1.9–8.1)
NEUTROPHILS NFR BLD AUTO: 75.6 % (ref 42.7–76)
PLATELET # BLD AUTO: 285 10*3/MM3 (ref 140–500)
PMV BLD AUTO: 10.2 FL (ref 6–12)
POTASSIUM BLD-SCNC: 4.3 MMOL/L (ref 3.5–5.2)
PROT SERPL-MCNC: 7 G/DL (ref 6–8.5)
PROTHROMBIN TIME: 14.7 SECONDS (ref 11.7–14.2)
RBC # BLD AUTO: 4.4 10*6/MM3 (ref 4.6–6)
SODIUM BLD-SCNC: 142 MMOL/L (ref 136–145)
WBC NRBC COR # BLD: 6.74 10*3/MM3 (ref 4.5–10.7)

## 2019-01-25 PROCEDURE — 82378 CARCINOEMBRYONIC ANTIGEN: CPT | Performed by: INTERNAL MEDICINE

## 2019-01-25 PROCEDURE — G0378 HOSPITAL OBSERVATION PER HR: HCPCS

## 2019-01-25 PROCEDURE — 99243 OFF/OP CNSLTJ NEW/EST LOW 30: CPT | Performed by: SURGERY

## 2019-01-25 PROCEDURE — 85610 PROTHROMBIN TIME: CPT | Performed by: INTERNAL MEDICINE

## 2019-01-25 PROCEDURE — 0FB03ZX EXCISION OF LIVER, PERCUTANEOUS APPROACH, DIAGNOSTIC: ICD-10-PCS | Performed by: SURGERY

## 2019-01-25 PROCEDURE — 71260 CT THORAX DX C+: CPT

## 2019-01-25 PROCEDURE — 77012 CT SCAN FOR NEEDLE BIOPSY: CPT

## 2019-01-25 PROCEDURE — 25010000002 HYDROMORPHONE PER 4 MG: Performed by: INTERNAL MEDICINE

## 2019-01-25 PROCEDURE — 25810000003 SODIUM CHLORIDE 0.9 % WITH KCL 20 MEQ 20-0.9 MEQ/L-% SOLUTION: Performed by: HOSPITALIST

## 2019-01-25 PROCEDURE — 85025 COMPLETE CBC W/AUTO DIFF WBC: CPT | Performed by: INTERNAL MEDICINE

## 2019-01-25 PROCEDURE — 80053 COMPREHEN METABOLIC PANEL: CPT | Performed by: INTERNAL MEDICINE

## 2019-01-25 PROCEDURE — 88307 TISSUE EXAM BY PATHOLOGIST: CPT | Performed by: SURGERY

## 2019-01-25 PROCEDURE — 25810000003 SODIUM CHLORIDE 0.9 % WITH KCL 20 MEQ 20-0.9 MEQ/L-% SOLUTION: Performed by: INTERNAL MEDICINE

## 2019-01-25 RX ORDER — LIDOCAINE HYDROCHLORIDE 10 MG/ML
20 INJECTION, SOLUTION INFILTRATION; PERINEURAL ONCE
Status: COMPLETED | OUTPATIENT
Start: 2019-01-25 | End: 2019-01-25

## 2019-01-25 RX ADMIN — LIDOCAINE HYDROCHLORIDE 20 ML: 10 INJECTION, SOLUTION INFILTRATION; PERINEURAL at 15:30

## 2019-01-25 RX ADMIN — GELATIN ABSORBABLE SPONGE 12-7 MM: 12-7 MISC at 16:27

## 2019-01-25 RX ADMIN — SODIUM CHLORIDE, PRESERVATIVE FREE 3 ML: 5 INJECTION INTRAVENOUS at 00:48

## 2019-01-25 RX ADMIN — FOLIC ACID 1 MG: 1 TABLET ORAL at 08:38

## 2019-01-25 RX ADMIN — NEBIVOLOL HYDROCHLORIDE 5 MG: 5 TABLET ORAL at 08:38

## 2019-01-25 RX ADMIN — POTASSIUM CHLORIDE AND SODIUM CHLORIDE 75 ML/HR: 900; 150 INJECTION, SOLUTION INTRAVENOUS at 23:39

## 2019-01-25 RX ADMIN — FLUTICASONE PROPIONATE 2 SPRAY: 50 SPRAY, METERED NASAL at 08:38

## 2019-01-25 RX ADMIN — SODIUM CHLORIDE, PRESERVATIVE FREE 3 ML: 5 INJECTION INTRAVENOUS at 21:50

## 2019-01-25 RX ADMIN — HYDROMORPHONE HYDROCHLORIDE 0.5 MG: 1 INJECTION, SOLUTION INTRAMUSCULAR; INTRAVENOUS; SUBCUTANEOUS at 05:36

## 2019-01-25 RX ADMIN — POTASSIUM CHLORIDE AND SODIUM CHLORIDE 100 ML/HR: 900; 150 INJECTION, SOLUTION INTRAVENOUS at 10:58

## 2019-01-25 RX ADMIN — GABAPENTIN 300 MG: 300 CAPSULE ORAL at 21:49

## 2019-01-25 RX ADMIN — Medication 100 MG: at 08:38

## 2019-01-25 RX ADMIN — POTASSIUM CHLORIDE AND SODIUM CHLORIDE 100 ML/HR: 900; 150 INJECTION, SOLUTION INTRAVENOUS at 00:46

## 2019-01-26 PROCEDURE — 25010000002 IOPAMIDOL 61 % SOLUTION: Performed by: HOSPITALIST

## 2019-01-26 PROCEDURE — G0378 HOSPITAL OBSERVATION PER HR: HCPCS

## 2019-01-26 PROCEDURE — 99254 IP/OBS CNSLTJ NEW/EST MOD 60: CPT | Performed by: INTERNAL MEDICINE

## 2019-01-26 PROCEDURE — 25010000002 HYDROMORPHONE PER 4 MG: Performed by: INTERNAL MEDICINE

## 2019-01-26 PROCEDURE — 99213 OFFICE O/P EST LOW 20 MIN: CPT | Performed by: SURGERY

## 2019-01-26 PROCEDURE — 25810000003 SODIUM CHLORIDE 0.9 % WITH KCL 20 MEQ 20-0.9 MEQ/L-% SOLUTION: Performed by: HOSPITALIST

## 2019-01-26 RX ORDER — SODIUM CHLORIDE, SODIUM LACTATE, POTASSIUM CHLORIDE, CALCIUM CHLORIDE 600; 310; 30; 20 MG/100ML; MG/100ML; MG/100ML; MG/100ML
50 INJECTION, SOLUTION INTRAVENOUS CONTINUOUS
Status: DISCONTINUED | OUTPATIENT
Start: 2019-01-26 | End: 2019-01-28

## 2019-01-26 RX ORDER — POLYETHYLENE GLYCOL 3350, SODIUM CHLORIDE, POTASSIUM CHLORIDE, SODIUM BICARBONATE, AND SODIUM SULFATE 240; 5.84; 2.98; 6.72; 22.72 G/4L; G/4L; G/4L; G/4L; G/4L
2000 POWDER, FOR SOLUTION ORAL DAILY
Status: COMPLETED | OUTPATIENT
Start: 2019-01-26 | End: 2019-01-27

## 2019-01-26 RX ORDER — BISACODYL 5 MG/1
10 TABLET, DELAYED RELEASE ORAL ONCE
Status: COMPLETED | OUTPATIENT
Start: 2019-01-26 | End: 2019-01-26

## 2019-01-26 RX ADMIN — BISACODYL 10 MG: 5 TABLET, COATED ORAL at 11:17

## 2019-01-26 RX ADMIN — HYDROMORPHONE HYDROCHLORIDE 0.5 MG: 1 INJECTION, SOLUTION INTRAMUSCULAR; INTRAVENOUS; SUBCUTANEOUS at 07:32

## 2019-01-26 RX ADMIN — NEBIVOLOL HYDROCHLORIDE 5 MG: 5 TABLET ORAL at 08:47

## 2019-01-26 RX ADMIN — FOLIC ACID 1 MG: 1 TABLET ORAL at 08:47

## 2019-01-26 RX ADMIN — SODIUM CHLORIDE, PRESERVATIVE FREE 3 ML: 5 INJECTION INTRAVENOUS at 08:48

## 2019-01-26 RX ADMIN — POTASSIUM CHLORIDE AND SODIUM CHLORIDE 75 ML/HR: 900; 150 INJECTION, SOLUTION INTRAVENOUS at 13:16

## 2019-01-26 RX ADMIN — SODIUM CHLORIDE, POTASSIUM CHLORIDE, SODIUM LACTATE AND CALCIUM CHLORIDE 50 ML/HR: 600; 310; 30; 20 INJECTION, SOLUTION INTRAVENOUS at 15:43

## 2019-01-26 RX ADMIN — Medication 100 MG: at 08:47

## 2019-01-26 RX ADMIN — IOPAMIDOL 75 ML: 612 INJECTION, SOLUTION INTRAVENOUS at 00:15

## 2019-01-26 RX ADMIN — POLYETHYLENE GLYCOL-3350 AND ELECTROLYTES WITH FLAVOR PACK 2000 ML: 240; 5.84; 2.98; 6.72; 22.72 POWDER, FOR SOLUTION ORAL at 20:11

## 2019-01-27 ENCOUNTER — ANESTHESIA (OUTPATIENT)
Dept: GASTROENTEROLOGY | Facility: HOSPITAL | Age: 64
End: 2019-01-27

## 2019-01-27 ENCOUNTER — ANESTHESIA EVENT (OUTPATIENT)
Dept: GASTROENTEROLOGY | Facility: HOSPITAL | Age: 64
End: 2019-01-27

## 2019-01-27 PROCEDURE — 25010000002 PROPOFOL 10 MG/ML EMULSION: Performed by: ANESTHESIOLOGY

## 2019-01-27 PROCEDURE — G0378 HOSPITAL OBSERVATION PER HR: HCPCS

## 2019-01-27 PROCEDURE — 45378 DIAGNOSTIC COLONOSCOPY: CPT | Performed by: SURGERY

## 2019-01-27 PROCEDURE — 99232 SBSQ HOSP IP/OBS MODERATE 35: CPT | Performed by: INTERNAL MEDICINE

## 2019-01-27 PROCEDURE — 0DJD8ZZ INSPECTION OF LOWER INTESTINAL TRACT, VIA NATURAL OR ARTIFICIAL OPENING ENDOSCOPIC: ICD-10-PCS | Performed by: SURGERY

## 2019-01-27 PROCEDURE — 25010000002 HYDROMORPHONE PER 4 MG: Performed by: INTERNAL MEDICINE

## 2019-01-27 RX ORDER — LIDOCAINE HYDROCHLORIDE 20 MG/ML
INJECTION, SOLUTION INFILTRATION; PERINEURAL AS NEEDED
Status: DISCONTINUED | OUTPATIENT
Start: 2019-01-27 | End: 2019-01-27 | Stop reason: SURG

## 2019-01-27 RX ORDER — PROPOFOL 10 MG/ML
VIAL (ML) INTRAVENOUS AS NEEDED
Status: DISCONTINUED | OUTPATIENT
Start: 2019-01-27 | End: 2019-01-27 | Stop reason: SURG

## 2019-01-27 RX ORDER — SODIUM CHLORIDE, SODIUM LACTATE, POTASSIUM CHLORIDE, CALCIUM CHLORIDE 600; 310; 30; 20 MG/100ML; MG/100ML; MG/100ML; MG/100ML
30 INJECTION, SOLUTION INTRAVENOUS CONTINUOUS PRN
Status: DISCONTINUED | OUTPATIENT
Start: 2019-01-27 | End: 2019-01-29 | Stop reason: HOSPADM

## 2019-01-27 RX ADMIN — GABAPENTIN 300 MG: 300 CAPSULE ORAL at 21:55

## 2019-01-27 RX ADMIN — HYDROMORPHONE HYDROCHLORIDE 0.5 MG: 1 INJECTION, SOLUTION INTRAMUSCULAR; INTRAVENOUS; SUBCUTANEOUS at 11:18

## 2019-01-27 RX ADMIN — FOLIC ACID 1 MG: 1 TABLET ORAL at 11:18

## 2019-01-27 RX ADMIN — POLYETHYLENE GLYCOL-3350 AND ELECTROLYTES WITH FLAVOR PACK 2000 ML: 240; 5.84; 2.98; 6.72; 22.72 POWDER, FOR SOLUTION ORAL at 06:30

## 2019-01-27 RX ADMIN — SODIUM CHLORIDE, POTASSIUM CHLORIDE, SODIUM LACTATE AND CALCIUM CHLORIDE 30 ML/HR: 600; 310; 30; 20 INJECTION, SOLUTION INTRAVENOUS at 08:54

## 2019-01-27 RX ADMIN — Medication 100 MG: at 11:18

## 2019-01-27 RX ADMIN — SODIUM CHLORIDE, POTASSIUM CHLORIDE, SODIUM LACTATE AND CALCIUM CHLORIDE: 600; 310; 30; 20 INJECTION, SOLUTION INTRAVENOUS at 08:55

## 2019-01-27 RX ADMIN — NEBIVOLOL HYDROCHLORIDE 5 MG: 5 TABLET ORAL at 08:12

## 2019-01-27 RX ADMIN — LIDOCAINE HYDROCHLORIDE 75 MG: 20 INJECTION, SOLUTION INFILTRATION; PERINEURAL at 09:00

## 2019-01-27 RX ADMIN — SODIUM CHLORIDE, POTASSIUM CHLORIDE, SODIUM LACTATE AND CALCIUM CHLORIDE 50 ML/HR: 600; 310; 30; 20 INJECTION, SOLUTION INTRAVENOUS at 11:18

## 2019-01-27 RX ADMIN — SODIUM CHLORIDE, PRESERVATIVE FREE 3 ML: 5 INJECTION INTRAVENOUS at 08:13

## 2019-01-27 RX ADMIN — PROPOFOL 300 MG: 10 INJECTION, EMULSION INTRAVENOUS at 09:00

## 2019-01-27 NOTE — ANESTHESIA PREPROCEDURE EVALUATION
Anesthesia Evaluation     Patient summary reviewed and Nursing notes reviewed                Airway   Mallampati: II  TM distance: >3 FB  Neck ROM: full  no difficulty expected  Dental - normal exam     Pulmonary - normal exam   Cardiovascular - normal exam    (+) hypertension, hyperlipidemia,       Neuro/Psych  GI/Hepatic/Renal/Endo      Musculoskeletal     Abdominal  - normal exam   Substance History      OB/GYN          Other   (+) arthritis   history of cancer                    Anesthesia Plan    ASA 3     MAC     Anesthetic plan, all risks, benefits, and alternatives have been provided, discussed and informed consent has been obtained with: patient.

## 2019-01-27 NOTE — ANESTHESIA POSTPROCEDURE EVALUATION
"Patient: Gelacio Cunningham    Procedure Summary     Date:  01/27/19 Room / Location:   [facility]    Anesthesia Start:  0854 Anesthesia Stop:  0923    Procedure:  COLONOSCOPY TO CECUM (N/A ) Diagnosis:       Cancer uncertain whether primary or metastatic (CMS/HCC)      (Cancer uncertain whether primary or metastatic (CMS/HCC) [C80.1])    Surgeon:  Eduardo Rashid MD Provider:  Luis Parada MD    Anesthesia Type:  MAC ASA Status:  3          Anesthesia Type: MAC  Last vitals  BP   152/83 (01/27/19 0848)   Temp   37.1 °C (98.7 °F) (01/27/19 0848)   Pulse   67 (01/27/19 0848)   Resp   16 (01/27/19 0848)     SpO2   95 % (01/27/19 0848)     Post Anesthesia Care and Evaluation    Patient location during evaluation: bedside  Patient participation: complete - patient participated  Level of consciousness: awake and alert  Pain management: adequate  Airway patency: patent  Anesthetic complications: No anesthetic complications    Cardiovascular status: acceptable  Respiratory status: acceptable  Hydration status: acceptable    Comments: /83 (BP Location: Left arm, Patient Position: Sitting)   Pulse 67   Temp 37.1 °C (98.7 °F) (Oral)   Resp 16   Ht 170.2 cm (67\")   Wt 84.7 kg (186 lb 12.8 oz)   SpO2 95%   BMI 29.26 kg/m²       "

## 2019-01-28 LAB
CYTO UR: NORMAL
LAB AP CASE REPORT: NORMAL
LAB AP CLINICAL INFORMATION: NORMAL
PATH REPORT.FINAL DX SPEC: NORMAL
PATH REPORT.GROSS SPEC: NORMAL

## 2019-01-28 PROCEDURE — G0378 HOSPITAL OBSERVATION PER HR: HCPCS

## 2019-01-28 PROCEDURE — 99233 SBSQ HOSP IP/OBS HIGH 50: CPT | Performed by: INTERNAL MEDICINE

## 2019-01-28 RX ORDER — OXYCODONE HYDROCHLORIDE 5 MG/1
10 TABLET ORAL EVERY 4 HOURS PRN
Status: DISCONTINUED | OUTPATIENT
Start: 2019-01-28 | End: 2019-01-29 | Stop reason: HOSPADM

## 2019-01-28 RX ADMIN — SODIUM CHLORIDE, PRESERVATIVE FREE 3 ML: 5 INJECTION INTRAVENOUS at 20:51

## 2019-01-28 RX ADMIN — FOLIC ACID 1 MG: 1 TABLET ORAL at 08:28

## 2019-01-28 RX ADMIN — Medication 100 MG: at 08:29

## 2019-01-28 RX ADMIN — NEBIVOLOL HYDROCHLORIDE 5 MG: 5 TABLET ORAL at 08:28

## 2019-01-28 RX ADMIN — GABAPENTIN 300 MG: 300 CAPSULE ORAL at 20:49

## 2019-01-28 RX ADMIN — SODIUM CHLORIDE, POTASSIUM CHLORIDE, SODIUM LACTATE AND CALCIUM CHLORIDE 50 ML/HR: 600; 310; 30; 20 INJECTION, SOLUTION INTRAVENOUS at 06:50

## 2019-01-29 VITALS
DIASTOLIC BLOOD PRESSURE: 74 MMHG | TEMPERATURE: 97.5 F | HEART RATE: 59 BPM | SYSTOLIC BLOOD PRESSURE: 119 MMHG | RESPIRATION RATE: 18 BRPM | OXYGEN SATURATION: 95 % | HEIGHT: 67 IN | WEIGHT: 186.8 LBS | BODY MASS INDEX: 29.32 KG/M2

## 2019-01-29 PROBLEM — Z85.820 HISTORY OF MELANOMA: Status: ACTIVE | Noted: 2019-01-29

## 2019-01-29 PROBLEM — R16.0 LIVER MASSES: Status: ACTIVE | Noted: 2019-01-29

## 2019-01-29 PROCEDURE — 99233 SBSQ HOSP IP/OBS HIGH 50: CPT | Performed by: INTERNAL MEDICINE

## 2019-01-29 RX ADMIN — FLUTICASONE PROPIONATE 2 SPRAY: 50 SPRAY, METERED NASAL at 08:28

## 2019-01-29 RX ADMIN — NEBIVOLOL HYDROCHLORIDE 5 MG: 5 TABLET ORAL at 08:27

## 2019-01-29 RX ADMIN — SODIUM CHLORIDE, PRESERVATIVE FREE 3 ML: 5 INJECTION INTRAVENOUS at 08:28

## 2019-01-29 RX ADMIN — Medication 100 MG: at 08:27

## 2019-01-29 RX ADMIN — FOLIC ACID 1 MG: 1 TABLET ORAL at 08:27

## 2019-01-30 ENCOUNTER — READMISSION MANAGEMENT (OUTPATIENT)
Dept: CALL CENTER | Facility: HOSPITAL | Age: 64
End: 2019-01-30

## 2019-01-31 ENCOUNTER — READMISSION MANAGEMENT (OUTPATIENT)
Dept: CALL CENTER | Facility: HOSPITAL | Age: 64
End: 2019-01-31

## 2019-01-31 NOTE — OUTREACH NOTE
Medical Week 1 Survey      Responses   Facility patient discharged from?  [facility]   Does the patient have one of the following disease processes/diagnoses(primary or secondary)?  Other   Is there a successful TCM telephone encounter documented?  No   Week 1 attempt successful?  Yes   Call start time  1137   Call end time  1139   Discharge diagnosis  Cancer uncertain whether primary or metastatic    Is patient permission given to speak with other caregiver?  Yes   Person spoke with today (if not patient) and relationship  Angelique Cunningham Spouse    Meds reviewed with patient/caregiver?  Yes   Is the patient having any side effects they believe may be caused by any medication additions or changes?  No   Does the patient have all medications ordered at discharge?  Yes   Is the patient taking all medications as directed (includes completed medication regime)?  Yes   Does the patient have a primary care provider?   Yes   Does the patient have an appointment with their PCP within 7 days of discharge?  No   Has the patient kept scheduled appointments due by today?  N/A   Comments  Reviewed appts. Advised to make PCP appt.   Has home health visited the patient within 72 hours of discharge?  N/A   Psychosocial issues?  No   Did the patient receive a copy of their discharge instructions?  Yes   Nursing interventions  Reviewed instructions with patient   What is the patient's perception of their health status since discharge?  Improving   Is the patient/caregiver able to teach back signs and symptoms related to disease process for when to call PCP?  Yes   Is the patient/caregiver able to teach back signs and symptoms related to disease process for when to call 911?  Yes   Week 1 call completed?  Yes          Kevin Benavidez RN

## 2019-01-31 NOTE — OUTREACH NOTE
Prep Survey      Responses   Facility patient discharged from?  [facility]   Is patient eligible?  Yes   Discharge diagnosis  Cancer uncertain whether primary or metastatic    Does the patient have one of the following disease processes/diagnoses(primary or secondary)?  Other   Does the patient have Home health ordered?  No   Is there a DME ordered?  No   Prep survey completed?  Yes          Taryn Bennett RN

## 2019-02-01 ENCOUNTER — HOSPITAL ENCOUNTER (OUTPATIENT)
Dept: PET IMAGING | Facility: HOSPITAL | Age: 64
Discharge: HOME | End: 2019-02-01
Attending: INTERNAL MEDICINE

## 2019-02-01 ENCOUNTER — HOSPITAL ENCOUNTER (OUTPATIENT)
Dept: PET IMAGING | Facility: HOSPITAL | Age: 64
Discharge: HOME | End: 2019-02-01
Attending: INTERNAL MEDICINE | Admitting: INTERNAL MEDICINE

## 2019-02-01 DIAGNOSIS — Z85.820 HISTORY OF MELANOMA: ICD-10-CM

## 2019-02-01 DIAGNOSIS — R10.11 RIGHT UPPER QUADRANT PAIN: ICD-10-CM

## 2019-02-01 DIAGNOSIS — R16.0 LIVER MASSES: ICD-10-CM

## 2019-02-01 LAB — GLUCOSE BLDC GLUCOMTR-MCNC: 73 MG/DL (ref 70–130)

## 2019-02-01 PROCEDURE — A9552 F18 FDG: HCPCS | Performed by: INTERNAL MEDICINE

## 2019-02-01 PROCEDURE — 82962 GLUCOSE BLOOD TEST: CPT

## 2019-02-01 PROCEDURE — 78815 PET IMAGE W/CT SKULL-THIGH: CPT

## 2019-02-01 PROCEDURE — 0 FLUDEOXYGLUCOSE F18 SOLUTION: Performed by: INTERNAL MEDICINE

## 2019-02-01 RX ADMIN — FLUDEOXYGLUCOSE F18 1 DOSE: 300 INJECTION INTRAVENOUS at 08:03

## 2019-02-05 ENCOUNTER — TELEPHONE (OUTPATIENT)
Dept: ONCOLOGY | Facility: CLINIC | Age: 64
End: 2019-02-05

## 2019-02-05 ENCOUNTER — TELEPHONE (OUTPATIENT)
Dept: GENERAL RADIOLOGY | Facility: HOSPITAL | Age: 64
End: 2019-02-05

## 2019-02-05 DIAGNOSIS — R16.0 LIVER MASSES: Primary | ICD-10-CM

## 2019-02-05 NOTE — TELEPHONE ENCOUNTER
----- Message from Albin Hummel MD sent at 2/5/2019  3:14 PM EST -----  Regarding: liver bx   Please schedule a CT guided liver biopsy.  Order is in.  Please have radiology notify Dr. Richardson of when the biopsy is scheduled so he can either do it or communicate with the radiologist doing it.  Please schedule it this week if possible and have them talk with Dr. Richardson if that's a problem.      He has an appt with me on Friday which may need to be moved to next week so that I have the biopsy result back when I see him.      Thanks,  ROCIO

## 2019-02-05 NOTE — TELEPHONE ENCOUNTER
Spoke with patient and wife on the phone today.  Right upper quadrant abdominal pain is intermittent, and he is taking Tylenol for this.      I reviewed the PET scan images and discuss the PET scan result with Dr. Richardson with interventional radiology.  We will plan for another liver biopsy of one of the lesions that is FDG avid, not one of the large necrotic lesions.    I discussed this with Mr. Cunningham on the phone today.  He is in agreement.  We will schedule.  We might need to move his follow-up appointment with me which is scheduled for Friday.

## 2019-02-07 ENCOUNTER — READMISSION MANAGEMENT (OUTPATIENT)
Dept: CALL CENTER | Facility: HOSPITAL | Age: 64
End: 2019-02-07

## 2019-02-08 ENCOUNTER — HOSPITAL ENCOUNTER (OUTPATIENT)
Dept: CT IMAGING | Facility: HOSPITAL | Age: 64
Discharge: HOME | End: 2019-02-08
Admitting: RADIOLOGY

## 2019-02-08 ENCOUNTER — APPOINTMENT (OUTPATIENT)
Dept: OTHER | Facility: HOSPITAL | Age: 64
End: 2019-02-08

## 2019-02-08 ENCOUNTER — APPOINTMENT (OUTPATIENT)
Dept: ONCOLOGY | Facility: CLINIC | Age: 64
End: 2019-02-08

## 2019-02-08 VITALS
DIASTOLIC BLOOD PRESSURE: 83 MMHG | WEIGHT: 184 LBS | RESPIRATION RATE: 16 BRPM | HEART RATE: 62 BPM | TEMPERATURE: 97 F | BODY MASS INDEX: 28.88 KG/M2 | OXYGEN SATURATION: 97 % | SYSTOLIC BLOOD PRESSURE: 143 MMHG | HEIGHT: 67 IN

## 2019-02-08 DIAGNOSIS — R16.0 LIVER MASSES: ICD-10-CM

## 2019-02-08 LAB
INR PPP: 1.2 (ref 0.8–1.2)
PROTHROMBIN TIME: 14 SECONDS (ref 12.8–15.2)

## 2019-02-08 PROCEDURE — 85610 PROTHROMBIN TIME: CPT

## 2019-02-08 PROCEDURE — 88342 IMHCHEM/IMCYTCHM 1ST ANTB: CPT | Performed by: INTERNAL MEDICINE

## 2019-02-08 PROCEDURE — 77012 CT SCAN FOR NEEDLE BIOPSY: CPT

## 2019-02-08 PROCEDURE — 88313 SPECIAL STAINS GROUP 2: CPT | Performed by: INTERNAL MEDICINE

## 2019-02-08 PROCEDURE — 88307 TISSUE EXAM BY PATHOLOGIST: CPT | Performed by: INTERNAL MEDICINE

## 2019-02-08 PROCEDURE — 25010000002 MORPHINE PER 10 MG: Performed by: RADIOLOGY

## 2019-02-08 PROCEDURE — 88341 IMHCHEM/IMCYTCHM EA ADD ANTB: CPT | Performed by: INTERNAL MEDICINE

## 2019-02-08 RX ORDER — MORPHINE SULFATE 2 MG/ML
2 INJECTION, SOLUTION INTRAMUSCULAR; INTRAVENOUS ONCE
Status: COMPLETED | OUTPATIENT
Start: 2019-02-08 | End: 2019-02-08

## 2019-02-08 RX ORDER — SODIUM CHLORIDE 0.9 % (FLUSH) 0.9 %
1-10 SYRINGE (ML) INJECTION AS NEEDED
Status: DISCONTINUED | OUTPATIENT
Start: 2019-02-08 | End: 2019-02-09 | Stop reason: HOSPADM

## 2019-02-08 RX ORDER — SODIUM CHLORIDE 0.9 % (FLUSH) 0.9 %
3 SYRINGE (ML) INJECTION EVERY 12 HOURS SCHEDULED
Status: DISCONTINUED | OUTPATIENT
Start: 2019-02-08 | End: 2019-02-09 | Stop reason: HOSPADM

## 2019-02-08 RX ORDER — LIDOCAINE HYDROCHLORIDE 10 MG/ML
20 INJECTION, SOLUTION INFILTRATION; PERINEURAL ONCE
Status: COMPLETED | OUTPATIENT
Start: 2019-02-08 | End: 2019-02-08

## 2019-02-08 RX ORDER — ACETAMINOPHEN 500 MG
1000 TABLET ORAL EVERY 6 HOURS PRN
COMMUNITY
End: 2019-05-15

## 2019-02-08 RX ADMIN — LIDOCAINE HYDROCHLORIDE 20 ML: 10 INJECTION, SOLUTION INFILTRATION; PERINEURAL at 08:51

## 2019-02-08 RX ADMIN — MORPHINE SULFATE 2 MG: 2 INJECTION, SOLUTION INTRAMUSCULAR; INTRAVENOUS at 09:20

## 2019-02-08 NOTE — DISCHARGE INSTRUCTIONS
EDUCATION /DISCHARGE INSTRUCTIONS  CT/US guided biopsy:  A biopsy is a procedure done to remove tissue for further analysis.  Before images are taken to locate the target area.  Images can be obtained using ultrasound, CT or MRI.  A physician will clean your skin with antiseptic soap, place a sterile towel around the site and administer a local anesthetic to numb the area.  The physician will then insert a special needle.  Sometimes images are taken of the needle after it is inserted to ensure the needle is in the correct area to be biopsied.   A sample is obtained and sent to the laboratory for study.  Occasionally the laboratory is unable to make a diagnosis from the sample and the procedure may need to be repeated.  Within a week the radiologist will send a report to your physician.  A pathologist will also examine the tissue and send a report.    Risks of the procedure include but are not limited to:   *  Bleeding    *  Infection   *  Puncture of surrounding organs *  Death     *  Lung collapse if the biopsy is near the chest which may require insertion of a       tube to re-inflate the lung if severe.    Benefits of the procedure:  Using x-ray helps to locate the area that requires a biopsy. The procedure is less invasive than a surgical procedure, there are no large incisions and it does not require anesthesia.    Alternatives to the procedure:  A biopsy can be performed surgically.  Risks of a surgical biopsy include exposure to anesthesia, infection, excessive bleeding and injury to abdominal organs.  A benefit of surgical biopsy is the ability to see the area to be biopsied and remove of a larger piece of tissue.  0701  THIS EDUCATION INFORMATION WAS REVIEWED PRIOR TO PROCEDURE AND CONSENT. Patient initials__________________Time___0701________________  Post Procedure:    *  Expect the biopsy site may be tender up to one week.    *  Rest today (no pushing pulling or straining).   *  Slowly increase activity  tomorrow.    *  If you received sedation do not drive for 24 hours.   *  Keep dressing clean and dry.   *  Leave dressing on puncture site for 24 hours.    *  You may shower when dressing removed.  Call your doctor if experiencing:   *  Signs of infection such as redness, swelling, excessive pain and / or foul        smelling drainage from the puncture site.   *  Chills or fever over 101 degrees (by mouth).   *  Unrelieved pain.   *  Any new or severe symptoms.   *  If experiencing sudden / severe shortness of breath or chest pain go to the       nearest emergency room.  Following the procedure:     Follow-up with the ordering physician as directed.    Continue to take other medications as directed by your physician unless    otherwise instructed.   If applicable, resume taking your blood thinners or Aspirin on _2/9/19 after 0900_.  If you have any concerns please call the Radiology Nurses Desk at [phone].  You are the most important factor in your recovery.  Follow the above instructions carefully.

## 2019-02-09 ENCOUNTER — TELEPHONE (OUTPATIENT)
Dept: INTERVENTIONAL RADIOLOGY/VASCULAR | Facility: HOSPITAL | Age: 64
End: 2019-02-09

## 2019-02-14 ENCOUNTER — READMISSION MANAGEMENT (OUTPATIENT)
Dept: CALL CENTER | Facility: HOSPITAL | Age: 64
End: 2019-02-14

## 2019-02-14 NOTE — OUTREACH NOTE
Medical Week 3 Survey      Responses   Facility patient discharged from?  [facility]   Does the patient have one of the following disease processes/diagnoses(primary or secondary)?  Other   Week 3 attempt successful?  Yes   Call start time  1032   Call end time  1033   Discharge diagnosis  Cancer uncertain whether primary or metastatic    Is patient permission given to speak with other caregiver?  Yes   List who call center can speak with  Angelique- Wife   Person spoke with today (if not patient) and relationship  Angelique- Wife   Meds reviewed with patient/caregiver?  Yes   Is the patient having any side effects they believe may be caused by any medication additions or changes?  No   Does the patient have all medications ordered at discharge?  Yes   Is the patient taking all medications as directed (includes completed medication regime)?  Yes   Does the patient have a primary care provider?   Yes   Has the patient kept scheduled appointments due by today?  Yes   Psychosocial issues?  No   Did the patient receive a copy of their discharge instructions?  Yes   Nursing interventions  Reviewed instructions with patient, Educated on MyChart   What is the patient's perception of their health status since discharge?  Improving   Is the patient/caregiver able to teach back signs and symptoms related to disease process for when to call PCP?  Yes   Is the patient/caregiver able to teach back signs and symptoms related to disease process for when to call 911?  Yes   Is the patient/caregiver able to teach back the hierarchy of who to call/visit for symptoms/problems? PCP, Specialist, Home health nurse, Urgent Care, ED, 911  Yes   Week 3 Call Completed?  Yes          Roya Bailey RN

## 2019-02-15 ENCOUNTER — OFFICE VISIT (OUTPATIENT)
Dept: ONCOLOGY | Facility: CLINIC | Age: 64
End: 2019-02-15

## 2019-02-15 ENCOUNTER — LAB (OUTPATIENT)
Dept: OTHER | Facility: HOSPITAL | Age: 64
End: 2019-02-15

## 2019-02-15 VITALS
SYSTOLIC BLOOD PRESSURE: 167 MMHG | BODY MASS INDEX: 30.62 KG/M2 | HEART RATE: 70 BPM | RESPIRATION RATE: 16 BRPM | DIASTOLIC BLOOD PRESSURE: 71 MMHG | OXYGEN SATURATION: 96 % | WEIGHT: 195.1 LBS | TEMPERATURE: 97.8 F | HEIGHT: 67 IN

## 2019-02-15 DIAGNOSIS — Z85.820 HISTORY OF MELANOMA: ICD-10-CM

## 2019-02-15 DIAGNOSIS — C78.7 METASTATIC MELANOMA TO LIVER (HCC): Primary | ICD-10-CM

## 2019-02-15 LAB
ALBUMIN SERPL-MCNC: 4.1 G/DL (ref 3.5–5.2)
ALBUMIN/GLOB SERPL: 1.5 G/DL
ALP SERPL-CCNC: 167 U/L (ref 39–117)
ALT SERPL W P-5'-P-CCNC: 28 U/L (ref 1–41)
ANION GAP SERPL CALCULATED.3IONS-SCNC: 10.2 MMOL/L
AST SERPL-CCNC: 24 U/L (ref 1–40)
BASOPHILS # BLD AUTO: 0.05 10*3/MM3 (ref 0–0.2)
BASOPHILS NFR BLD AUTO: 0.7 % (ref 0–1.5)
BILIRUB SERPL-MCNC: 0.3 MG/DL (ref 0.1–1.2)
BUN BLD-MCNC: 15 MG/DL (ref 8–23)
BUN/CREAT SERPL: 16.3 (ref 7–25)
CALCIUM SPEC-SCNC: 8.9 MG/DL (ref 8.6–10.5)
CHLORIDE SERPL-SCNC: 105 MMOL/L (ref 98–107)
CO2 SERPL-SCNC: 27.8 MMOL/L (ref 22–29)
CREAT BLD-MCNC: 0.92 MG/DL (ref 0.76–1.27)
DEPRECATED RDW RBC AUTO: 45.1 FL (ref 37–54)
EOSINOPHIL # BLD AUTO: 0.14 10*3/MM3 (ref 0–0.4)
EOSINOPHIL NFR BLD AUTO: 2 % (ref 0.3–6.2)
ERYTHROCYTE [DISTWIDTH] IN BLOOD BY AUTOMATED COUNT: 14.1 % (ref 12.3–15.4)
GFR SERPL CREATININE-BSD FRML MDRD: 84 ML/MIN/1.73
GLOBULIN UR ELPH-MCNC: 2.7 GM/DL
GLUCOSE BLD-MCNC: 176 MG/DL (ref 65–99)
HCT VFR BLD AUTO: 38.7 % (ref 37.5–51)
HGB BLD-MCNC: 12.4 G/DL (ref 13–17.7)
IMM GRANULOCYTES # BLD AUTO: 0.08 10*3/MM3 (ref 0–0.05)
IMM GRANULOCYTES NFR BLD AUTO: 1.2 % (ref 0–0.5)
LYMPHOCYTES # BLD AUTO: 1.12 10*3/MM3 (ref 0.7–3.1)
LYMPHOCYTES NFR BLD AUTO: 16.2 % (ref 19.6–45.3)
MCH RBC QN AUTO: 28.1 PG (ref 26.6–33)
MCHC RBC AUTO-ENTMCNC: 32 G/DL (ref 31.5–35.7)
MCV RBC AUTO: 87.6 FL (ref 79–97)
MONOCYTES # BLD AUTO: 0.5 10*3/MM3 (ref 0.1–0.9)
MONOCYTES NFR BLD AUTO: 7.2 % (ref 5–12)
NEUTROPHILS # BLD AUTO: 5.02 10*3/MM3 (ref 1.4–7)
NEUTROPHILS NFR BLD AUTO: 72.7 % (ref 42.7–76)
NRBC BLD AUTO-RTO: 0 /100 WBC (ref 0–0)
PLATELET # BLD AUTO: 280 10*3/MM3 (ref 140–450)
PMV BLD AUTO: 10.2 FL (ref 6–12)
POTASSIUM BLD-SCNC: 3.8 MMOL/L (ref 3.5–5.2)
PROT SERPL-MCNC: 6.8 G/DL (ref 6–8.5)
RBC # BLD AUTO: 4.42 10*6/MM3 (ref 4.14–5.8)
SODIUM BLD-SCNC: 143 MMOL/L (ref 136–145)
WBC NRBC COR # BLD: 6.91 10*3/MM3 (ref 3.4–10.8)

## 2019-02-15 PROCEDURE — 80053 COMPREHEN METABOLIC PANEL: CPT | Performed by: INTERNAL MEDICINE

## 2019-02-15 PROCEDURE — 85025 COMPLETE CBC W/AUTO DIFF WBC: CPT | Performed by: INTERNAL MEDICINE

## 2019-02-15 PROCEDURE — 36415 COLL VENOUS BLD VENIPUNCTURE: CPT

## 2019-02-15 PROCEDURE — 99215 OFFICE O/P EST HI 40 MIN: CPT | Performed by: INTERNAL MEDICINE

## 2019-02-15 NOTE — PROGRESS NOTES
[facility] GROUP OUTPATIENT FOLLOW UP CLINIC VISIT    REASON FOR FOLLOW-UP:    1.  Metastatic melanoma with metastatic disease present in the lung, liver and bone.    HISTORY OF PRESENT ILLNESS:  Gelacio Cunningham is a 63 y.o. male who returns today for follow up of the above issue.  He is doing well.  He denies any pain at this point and he is not taking any pain medication.  Energy level is excellent.  No neurologic symptoms.  His rheumatoid arthritis is not causing him any problems at this point and he denies any arthralgias.  He wants to go back to work.        ONCOLOGIC HISTORY:  The patient has a past medical history significant for hypertension and rheumatoid arthritis.  For his rheumatoid arthritis he has been treated with a number of agents in the past including methotrexate, Enbrel, Humira, and more recently rituximab which was started around 1-1/2 years ago with his most recent treatment administered around one month ago.  He has some chronic diffuse arthralgias which are reasonably well-controlled on rituximab.  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  This was diagnosed initially by his dermatologist Dr. Aggarwal.  He was referred to Dr. Vega and underwent surgery at [facility] in late 2017 with wide resection of the lesion and sentinel lymph node biopsy.  The patient reports that the margin was positive from the resection however lymph nodes were negative.  He had a second surgery with negative margins.  He did not receive any adjuvant therapy.     Recently, the patient developed symptoms around 2 weeks ago with mild nausea and loss of appetite.  He then developed around 1 week ago right-sided abdominal pain.  He presented to his primary care physician and underwent a right upper quadrant ultrasound on 1/24/19 showing multiple liver masses the largest of which measured 7.7 cm in the right hepatic lobe.  He was admitted and underwent CT of the abdomen  and pelvis on 1/24/19 which confirmed multiple hepatic masses worrisome for metastatic disease with the largest lesion in the right lobe measuring 8.3 cm.  There was a 1 cm sclerotic nodule in the left sacral ala which was possibly related to a bone island.  There was a subtle area of narrowing around the mid transverse colon which was felt to possibly be related to wall thickening.  General surgery has been consulted and there are plans to pursue colonoscopy tomorrow.  He has undergone prior colonoscopy 12/23/16 with a single right-sided polyp removed.  He has now undergone CT chest 1/25/19 which showed 3 small pulmonary nodules in the left upper lobe measuring up to 9 mm which are indeterminate and a small 1 cm nodule in the left lower lobe which may be focal atelectasis.  There were some trace bilateral pleural effusions.  The patient did undergo CT-guided liver biopsy 1/25/19 with pathology non-diagnostic.    He was discharged from the hospital.  He subsequently had a PET scan on 2/1/2019 which allowed us to target a different liver lesion that was not necrotic.  He did have a liver biopsy on 2/8/2019 with results consistent with metastatic melanoma.  The biopsy also showed evidence for acute cholangitis, increased portal fibrosis with mixed inflammation with lymphocytes and eosinophils as well as sinusoidal dilatation consistent with cardiac etiology.      ALLERGIES:  Allergies   Allergen Reactions   • Sulindac Diarrhea       MEDICATIONS:  The medication list has been reviewed with the patient by the medical assistant, and the list has been updated in the electronic medical record, which I reviewed.  Medication dosages and frequencies were confirmed to be accurate.    REVIEW OF SYSTEMS:  PAIN:  See Vital Signs below.  GENERAL:  No fevers, chills, night sweats, or unintended weight loss.  SKIN:  No rashes or non-healing lesions.  HEME/LYMPH:  No abnormal bleeding.  No palpable lymphadenopathy.  EYES:  No vision  "changes or diplopia.  ENT:  No sore throat or difficulty swallowing.  RESPIRATORY:  No cough, shortness of breath, hemoptysis, or wheezing.  CARDIOVASCULAR:  No chest pain, palpitations, orthopnea, or dyspnea on exertion.  GASTROINTESTINAL:  No abdominal pain, nausea, vomiting, constipation, diarrhea, melena, or hematochezia.  GENITOURINARY:  No dysuria or hematuria.  MUSCULOSKELETAL:  No joint pain, swelling, or erythema.  NEUROLOGIC:  No dizziness, loss of consciousness, or seizures.  PSYCHIATRIC:  No depression, anxiety, or mood changes.    Vitals:    02/15/19 0848   BP: 167/71   Pulse: 70   Resp: 16   Temp: 97.8 °F (36.6 °C)   TempSrc: Oral   SpO2: 96%   Weight: 88.5 kg (195 lb 1.6 oz)   Height: 170.2 cm (67.01\")   PainSc: 0-No pain       PHYSICAL EXAMINATION:  GENERAL:  Well-developed well-nourished male; awake, alert and oriented, in no acute distress.  SKIN:  Warm and dry, without rashes, purpura, or petechiae.  HEAD:  Normocephalic, atraumatic.  EYES:  Pupils equal, round and reactive to light.  Extraocular movements intact.  Conjunctivae normal.  EARS:  Hearing intact.  NOSE:  Septum midline.  No excoriations or nasal discharge.  MOUTH:  No stomatitis or ulcers.  Lips are normal.  THROAT:  Oropharynx without lesions or exudates.  NECK:  Supple with good range of motion; no thyromegaly or masses; no JVD or bruits.  LYMPHATICS:  No cervical, supraclavicular, axillary, or inguinal lymphadenopathy.  CHEST:  Lungs are clear to auscultation bilaterally.  No wheezes, rales, or rhonchi.  HEART:  Regular rate; normal rhythm.  No murmurs, gallops or rubs.  ABDOMEN:  Soft, non-tender, non-distended.  Normal active bowel sounds.  No organomegaly.  EXTREMITIES:  No clubbing, cyanosis, or edema.  NEUROLOGICAL:  No focal neurologic deficits.    DIAGNOSTIC DATA:  Results for orders placed or performed in visit on 02/15/19   CBC Auto Differential   Result Value Ref Range    WBC 6.91 3.40 - 10.80 10*3/mm3    RBC 4.42 4.14 - " 5.80 10*6/mm3    Hemoglobin 12.4 (L) 13.0 - 17.7 g/dL    Hematocrit 38.7 37.5 - 51.0 %    MCV 87.6 79.0 - 97.0 fL    MCH 28.1 26.6 - 33.0 pg    MCHC 32.0 31.5 - 35.7 g/dL    RDW 14.1 12.3 - 15.4 %    RDW-SD 45.1 37.0 - 54.0 fl    MPV 10.2 6.0 - 12.0 fL    Platelets 280 140 - 450 10*3/mm3    Neutrophil % 72.7 42.7 - 76.0 %    Lymphocyte % 16.2 (L) 19.6 - 45.3 %    Monocyte % 7.2 5.0 - 12.0 %    Eosinophil % 2.0 0.3 - 6.2 %    Basophil % 0.7 0.0 - 1.5 %    Immature Grans % 1.2 (H) 0.0 - 0.5 %    Neutrophils, Absolute 5.02 1.40 - 7.00 10*3/mm3    Lymphocytes, Absolute 1.12 0.70 - 3.10 10*3/mm3    Monocytes, Absolute 0.50 0.10 - 0.90 10*3/mm3    Eosinophils, Absolute 0.14 0.00 - 0.40 10*3/mm3    Basophils, Absolute 0.05 0.00 - 0.20 10*3/mm3    Immature Grans, Absolute 0.08 (H) 0.00 - 0.05 10*3/mm3    nRBC 0.0 0.0 - 0.0 /100 WBC       IMAGING: I personally reviewed his PET scan images from 2/1/2019.  Multiple hypermetabolic bone lesions as well as large confluent liver lesions and a hypermetabolic pulmonary nodules present.    FINDINGS: The only hypermetabolic pulmonary nodule is at the left upper  lobe measuring 1.0 cm with a maximal SUV of 6.7. The 2 subcentimeter  pulmonary nodules are photopenic, but are likely too small for PET  characterization. There is a tiny lytic lesion within the spine of the  left scapula with a maximal SUV of 8.0. There is a tiny hypermetabolic  focus within the cortex of the right humeral neck with a maximal SUV of  4.4. There is a tiny lytic lesion at the posterior cortex of the T8  vertebral body measuring 0.7 cm and the maximal SUV is 6.3. There are  also tiny hypermetabolic lytic lesions at the left aspect of L3 and at  the anterior superior aspect of the sacrum with a maximal SUV of 12.7.  The L3 lytic lesion measures approximately 1 cm while the left para  midline hypermetabolic sacral lesion is nearly inconspicuous. There is a  tiny focus of hypermetabolic activity within the right  ischium with a  maximal SUV of 6.6. No definite lesion is seen on the corresponding CT  sequence. There are multiple hypermetabolic liver lesions, the  hypermetabolic foci within the liver surround larger lesions which are  likely necrotic. A confluent approximately 8 cm right hepatic lobe liver  lesion is for the most part photopenic, but has peripheral nodules of  hypermetabolic activity, maximal SUV of 14.1. There is no suspicious  hypermetabolic activity within the adrenal glands and there is no  hypermetabolic lymphadenopathy within the abdomen or pelvis.     IMPRESSION:  There are multiple hypermetabolic bone lesions which likely  represent metastases. Some of the lesions are lytic while others are  nearly inconspicuous on the corresponding CT. The large confluent liver  lesions are predominantly necrotic with hypermetabolic nodules in the  periphery. One of the pulmonary nodules is hypermetabolic and the  subcentimeter nodules are likely too small for PET characterization. For  tissue diagnosis, CT-guided biopsy of the periphery of the dominant  liver lesions or the 1 cm left upper lobe pulmonary nodule should be  considered.    ASSESSMENT:  This is a 63 y.o. male with:  1. Metastatic melanoma:  · Patient with recent onset of symptoms including nausea, anorexia, right-sided abdominal pain over the 2 weeks preceeding admission in January 2019  · Abnormal right upper quadrant ultrasound showing multiple liver masses 1/24/19.  · CT abdomen and pelvis 1/24/19 confirmed multiple hepatic masses worrisome for metastatic disease with the largest lesion in the right lobe measuring 8.3 cm.  There was a 1 cm sclerotic nodule in the left sacral ala which was possibly related to a bone island.  There was a subtle area of narrowing around the mid transverse colon which was felt to possibly be related to wall thickening.  · CT chest 1/25/19 showed 3 small pulmonary nodules in the left upper lobe measuring up to 9 mm which  are indeterminate and a small 1 cm nodule in the left lower lobe which may be focal atelectasis.  There were some trace bilateral pleural effusions.   · CT-guided liver biopsy 1/25/19 with pathology only showing blood.   · Colonoscopy by Dr. Rashid on 1/27 negative.  CEA 1/25/19 was normal at 1.4.   · Given the patient's history of melanoma in late 2017, there was concern regarding possible metastatic melanoma as well.  · Colonoscopy 1/27/2019 negative for malignancy.  · Discussed with Dr. Richardson with radiology.  Outpatient PET with repeat liver biopsy based on these results  · PET performed 2/1/2019  · Repeat liver biopsy on 2/8/2019 confirms metastatic melanoma with evidence also for sinusoidal dilatation consistent with cardiac etiology, increased portal fibrosis with mixed inflammation with lymphocytes and eosinophils, and acute cholangitis.  2. Rheumatoid arthritis:  · The patient has long-standing disease treated in the past with methotrexate, Enbrel, Humira, and more recently rituximab which was started around 1-1/2 years ago with his most recent treatment administered around late December 2018.    · He has chronic diffuse arthralgias which are reasonably well-controlled on rituximab.  · As above, as metastatic melanoma is identified, this issue will complicate use of immunotherapy.  3. History of cutaneous melanoma of the right ear:  · Diagnosed initially by his dermatologist Dr. Aggarwal, appears to have been in the pre-auricular region.  Pathology reviewed from initial biopsy showing Breslow 0.82 mm, non-ulcerated lesion with low mitotic rate.  · He was referred to Dr. Vega and underwent surgery at [facility] in December 2017 with wide resection of the lesion and sentinel lymph node biopsy.  We do not yet have that pathology report however per notes available, there was a residual area of melanoma and sentinel nodes were negative.  He therefore underwent a second procedure for wide  excision with presumably negative margins.     · He did not receive any adjuvant therapy.  · Right ear and neck normal on exam currently.  4. Right upper quadrant abdominal pain: Resolved at this point    PLAN:  1.  Molecular testing including BRAF mutation analysis is currently pending  2.  We will request an MRI of his brain  3.  Referred to Dr. Favio Islas at [place] for an opinion regarding treatment considering his underlying rheumatoid arthritis requiring rituximab.  If immunotherapy is chosen, we may choose Keytruda rather than Opdivo/Yervoy.  4.  I will see him back in a few weeks for follow-up.  5.  As he is asymptomatic at this time, not requiring opiates and he has no neurologic symptoms, I believe that it is safe for him to return to work without restrictions.  This may change pending imaging results and then what treatment he requires.      I discussed his case today with Dr. Brody with my practice.

## 2019-02-18 ENCOUNTER — TELEPHONE (OUTPATIENT)
Dept: ONCOLOGY | Facility: HOSPITAL | Age: 64
End: 2019-02-18

## 2019-02-18 NOTE — TELEPHONE ENCOUNTER
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 She states she is with worker comp and she wants to make sure Dr. Hummel is aware of what pt does for a living. 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 She understands that Dr. Hummel said he could return to work but they will not approve him to return until he has the MRI of the brain to make sure there is no metastasis and that he is safe to drive. Advised Abby that Dr. Hummel is on vacation but I would send him a message informing him specifically of what pt does and see if this changes his opinion on if he can return to work. Regardless, they will not approve pt to return to work until the MRI is done. She will check back once MRI is done on 2/25. Message sent to Dr. Hummel.

## 2019-02-22 ENCOUNTER — READMISSION MANAGEMENT (OUTPATIENT)
Dept: CALL CENTER | Facility: HOSPITAL | Age: 64
End: 2019-02-22

## 2019-02-22 NOTE — OUTREACH NOTE
Medical Week 4 Survey      Responses   Facility patient discharged from?  [facility]   Does the patient have one of the following disease processes/diagnoses(primary or secondary)?  Other   Week 4 attempt successful?  Yes   Call start time  1238   Call end time  1239   Discharge diagnosis  Cancer uncertain whether primary or metastatic    Is patient permission given to speak with other caregiver?  Yes   Person spoke with today (if not patient) and relationship  Aneglique- Wife   Meds reviewed with patient/caregiver?  Yes   Is the patient having any side effects they believe may be caused by any medication additions or changes?  No   Is the patient taking all medications as directed (includes completed medication regime)?  Yes   Has the patient kept scheduled appointments due by today?  Yes   Comments  -   Is the patient still receiving Home Health Services?  N/A   Psychosocial issues?  No   Comments  He is doing as well as can be expected.    What is the patient's perception of their health status since discharge?  Improving   Is the patient/caregiver able to teach back signs and symptoms related to disease process for when to call PCP?  Yes   Is the patient/caregiver able to teach back signs and symptoms related to disease process for when to call 911?  Yes   Is the patient/caregiver able to teach back the hierarchy of who to call/visit for symptoms/problems? PCP, Specialist, Home health nurse, Urgent Care, ED, 911  Yes   Week 4 Call Completed?  Yes   Would the patient like one additional call?  No   Graduated  Yes   Did the patient feel the follow up calls were helpful during their recovery period?  Yes   Was the number of calls appropriate?  Yes          Fer Jorge RN

## 2019-02-25 ENCOUNTER — TELEPHONE (OUTPATIENT)
Dept: ONCOLOGY | Facility: CLINIC | Age: 64
End: 2019-02-25

## 2019-02-25 ENCOUNTER — HOSPITAL ENCOUNTER (OUTPATIENT)
Dept: MRI IMAGING | Facility: HOSPITAL | Age: 64
Discharge: HOME | End: 2019-02-25
Admitting: INTERNAL MEDICINE

## 2019-02-25 DIAGNOSIS — C43.9 MALIGNANT MELANOMA, UNSPECIFIED SITE (HCC): ICD-10-CM

## 2019-02-25 DIAGNOSIS — C78.7 METASTATIC MELANOMA TO LIVER (HCC): ICD-10-CM

## 2019-02-25 DIAGNOSIS — C79.31 BRAIN METASTASIS: Primary | ICD-10-CM

## 2019-02-25 LAB — CREAT BLDA-MCNC: 1 MG/DL (ref 0.6–1.3)

## 2019-02-25 PROCEDURE — 70553 MRI BRAIN STEM W/O & W/DYE: CPT

## 2019-02-25 PROCEDURE — 82565 ASSAY OF CREATININE: CPT

## 2019-02-25 PROCEDURE — A9577 INJ MULTIHANCE: HCPCS | Performed by: INTERNAL MEDICINE

## 2019-02-25 PROCEDURE — 0 GADOBENATE DIMEGLUMINE 529 MG/ML SOLUTION: Performed by: INTERNAL MEDICINE

## 2019-02-25 RX ADMIN — GADOBENATE DIMEGLUMINE 18 ML: 529 INJECTION, SOLUTION INTRAVENOUS at 09:14

## 2019-02-25 NOTE — TELEPHONE ENCOUNTER
I spoke with the patient regarding his brain MRI result which shows a few lesions, most notably a large likely hemorrhagic lesion in the left frontal area.  He is asymptomatic.  Referred to radiation oncology.  He will see Dr. Islas at [place] week.  I will make sure that Dr. Islas knows.  With other lesions present, I am not sure that this is a neurosurgical issue, especially with extensive disease elsewhere and because he is asymptomatic.    Will communicate with Dr. Islas at [place].

## 2019-03-04 ENCOUNTER — APPOINTMENT (OUTPATIENT)
Dept: RADIATION ONCOLOGY | Facility: HOSPITAL | Age: 64
End: 2019-03-04

## 2019-03-04 ENCOUNTER — CONSULT (OUTPATIENT)
Dept: RADIATION ONCOLOGY | Facility: HOSPITAL | Age: 64
End: 2019-03-04

## 2019-03-04 VITALS
WEIGHT: 194 LBS | OXYGEN SATURATION: 96 % | HEIGHT: 67 IN | DIASTOLIC BLOOD PRESSURE: 86 MMHG | HEART RATE: 57 BPM | BODY MASS INDEX: 30.45 KG/M2 | SYSTOLIC BLOOD PRESSURE: 145 MMHG

## 2019-03-04 DIAGNOSIS — C79.31 BRAIN METASTASES: Primary | ICD-10-CM

## 2019-03-04 PROCEDURE — 99243 OFF/OP CNSLTJ NEW/EST LOW 30: CPT | Performed by: RADIOLOGY

## 2019-03-04 PROCEDURE — G0463 HOSPITAL OUTPT CLINIC VISIT: HCPCS | Performed by: RADIOLOGY

## 2019-03-04 NOTE — PROGRESS NOTES
Subjective     Albin Hummel MD    No diagnosis found.        Mr. 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  He now presents with approximately two-week history of nausea and anorexia.  And abdominal pain.  He was referred by his PCP for right upper quadrant ultrasound on 1/24/19 which noted multiple liver metastases.  CT scan of the abdomen and pelvis confirmed.  CT-guided liver biopsy in 1/25/19 was nondiagnostic.  Colonoscopy was negative.  Additional imaging included a PET scan on 2/1 which describes a single hypermetabolic left upper lobe lesion, multiple small bone lesions and multiple hypermetabolic liver metastases.  The diagnosis of metastatic melanoma was established at CT-guided core liver biopsy on 2/8/19.  [facility] in [facility] and an MR of the brain done on 2/25/19 describes 2 apparent metastatic lesions.  The largest is in the anterior portion of left frontal lobe measuring 3.8 x 2.3 cm and a smaller metastatic lesion is noted within the medial portion of the right parietal lobe measuring 5-6 mm in greatest dimension.  There is evidence that these metastatic lesions could be hemorrhagic.  We are asked to evaluate him for SRS/SRT to these 2 lesions.                               Review of Systems   Constitutional: Positive for unexpected weight change.   HENT: Positive for tinnitus.    Eyes: Negative.    Respiratory: Negative.    Cardiovascular: Negative.    Gastrointestinal: Positive for nausea.   Endocrine: Negative.    Genitourinary: Negative.    Musculoskeletal: Positive for arthralgias.   Neurological: Positive for headaches.         Past Medical History:   Diagnosis Date   • Arthritis     Rheumatoid arthritis multiple sites   • Cancer (CMS/HCC) 2017     melanoma, right ear   • DDD (degenerative disc disease), lumbar    • Hypertension    • Liver disease     liver lesions   • Neck mass, left    • Rheumatoid arthritis (CMS/HCC)     has had 14 years, was on Methotrexate, Embrel, Humira and now Rituxin         Past Surgical History:   Procedure Laterality Date   • COLONOSCOPY N/A 2016    Procedure: COLONOSCOPY TO CECUM WITH POLYPECTOMY (COLD BIOPSY);  Surgeon: Antolin Munoz Jr., MD;  Location: [facility] ENDOSCOPY;  Service:    • COLONOSCOPY N/A 2019    Procedure: COLONOSCOPY TO CECUM;  Surgeon: Eduardo Rashid MD;  Location: [facility] ENDOSCOPY;  Service: General         Social History     Socioeconomic History   • Marital status:      Spouse name: Angelique   • Number of children: Not on file   • Years of education: Not on file   • Highest education level: Not on file   Occupational History     Employer: [employer]   Tobacco Use   • Smoking status: Former Smoker     Last attempt to quit: 2004     Years since quittin.2   • Smokeless tobacco: Never Used   Substance and Sexual Activity   • Alcohol use: No   • Drug use: No   • Sexual activity: Defer         Family History   Problem Relation Age of Onset   • COPD Father    • Cancer Father    • Asthma Brother    • Diabetes Paternal Grandfather           Objective    Physical Exam  Spontaneous, awake, alert, in no apparent distress.  Affect and thought content are appropriate, no signs of confusion, speech is intact.  Station and gait is within normal limits.    Current Outpatient Medications on File Prior to Visit   Medication Sig Dispense Refill   • acetaminophen (TYLENOL) 500 MG tablet Take 1,000 mg by mouth Every 6 (Six) Hours As Needed for Mild Pain .     • flunisolide (NASALIDE) 25 MCG/ACT (0.025%) solution nasal spray Inhale 2 sprays Every 12 (Twelve) Hours. 25 mL 1   • folic acid (FOLVITE) 1 MG tablet Take 1 mg by mouth Daily.     • gabapentin (NEURONTIN) 300 MG capsule Take 1  "capsule by mouth Daily. (Patient taking differently: Take 300 mg by mouth Every Night.) 90 capsule 1   • MAGNESIUM PO Take  by mouth Daily. Patient unsure of dose     • montelukast (SINGULAIR) 10 MG tablet Take 1 tablet by mouth Every Night. 30 tablet 1   • Multiple Vitamins-Minerals (MULTIVITAMIN WITH MINERALS) tablet tablet Take 1 tablet by mouth Daily.     • nebivolol (BYSTOLIC) 5 MG tablet Take 1 tablet by mouth Daily. 90 tablet 1   • vitamin B-6 (PYRIDOXINE) 100 MG tablet Take 100 mg by mouth Daily.       No current facility-administered medications on file prior to visit.        ALLERGIES:    Allergies   Allergen Reactions   • Sulindac Diarrhea       /86   Pulse 57   Ht 168.9 cm (66.5\")   Wt 88 kg (194 lb)   SpO2 96%   BMI 30.84 kg/m²      Current Status 2/15/2019   ECOG score 0         Assessment/Plan       Widely metastatic malignant melanoma with 2 brain metastases.  We discussed SRS/SRT for each of the 2 lesions, if insurance will approve.  The patient plans on declining whole brain radiation therapy if a stereotactic approach is not approved.      I spent greater than 40 minutes and face-to-face time with the patient and greater than 35 minutes of that time was spent in counseling and coordination of care to include review of imaging as well as discussion of indications, goals, logistics, alternatives, and risks both common and rare.             Cleveland Rosado MD       "

## 2019-03-07 ENCOUNTER — DOCUMENTATION (OUTPATIENT)
Dept: RADIATION ONCOLOGY | Facility: HOSPITAL | Age: 64
End: 2019-03-07

## 2019-03-07 DIAGNOSIS — C79.31 BRAIN METASTASES: Primary | ICD-10-CM

## 2019-03-07 PROCEDURE — 77263 THER RADIOLOGY TX PLNG CPLX: CPT | Performed by: RADIOLOGY

## 2019-03-07 NOTE — PROGRESS NOTES
We received insurance approval today for a stereotactic approach to these 2 brain metastases.  My current plan is to treat the larger of the 2 lesions in the anterior left frontal lobe in 5 fractions and the smaller 6 mm lesion in the medial right parietal lobe with a single fraction, both to be completed over 5 treatment days.  We will call  the patient in this morning for mask construction and CT simulation to start the planning process.

## 2019-03-08 ENCOUNTER — OFFICE VISIT (OUTPATIENT)
Dept: ONCOLOGY | Facility: CLINIC | Age: 64
End: 2019-03-08

## 2019-03-08 ENCOUNTER — LAB (OUTPATIENT)
Dept: OTHER | Facility: HOSPITAL | Age: 64
End: 2019-03-08

## 2019-03-08 VITALS
HEIGHT: 66 IN | SYSTOLIC BLOOD PRESSURE: 116 MMHG | RESPIRATION RATE: 16 BRPM | TEMPERATURE: 97.6 F | OXYGEN SATURATION: 96 % | DIASTOLIC BLOOD PRESSURE: 72 MMHG | BODY MASS INDEX: 30.95 KG/M2 | WEIGHT: 192.6 LBS | HEART RATE: 56 BPM

## 2019-03-08 DIAGNOSIS — C78.7 METASTATIC MELANOMA TO LIVER (HCC): Primary | ICD-10-CM

## 2019-03-08 DIAGNOSIS — C79.31 BRAIN METASTASES: ICD-10-CM

## 2019-03-08 DIAGNOSIS — C78.7 METASTATIC MELANOMA TO LIVER (HCC): ICD-10-CM

## 2019-03-08 LAB
BASOPHILS # BLD AUTO: 0.03 10*3/MM3 (ref 0–0.2)
BASOPHILS NFR BLD AUTO: 0.4 % (ref 0–1.5)
DEPRECATED RDW RBC AUTO: 42.7 FL (ref 37–54)
EOSINOPHIL # BLD AUTO: 0.3 10*3/MM3 (ref 0–0.4)
EOSINOPHIL NFR BLD AUTO: 4 % (ref 0.3–6.2)
ERYTHROCYTE [DISTWIDTH] IN BLOOD BY AUTOMATED COUNT: 13.7 % (ref 12.3–15.4)
HCT VFR BLD AUTO: 41.6 % (ref 37.5–51)
HGB BLD-MCNC: 13.9 G/DL (ref 13–17.7)
IMM GRANULOCYTES # BLD AUTO: 0.08 10*3/MM3 (ref 0–0.05)
IMM GRANULOCYTES NFR BLD AUTO: 1.1 % (ref 0–0.5)
LYMPHOCYTES # BLD AUTO: 1.21 10*3/MM3 (ref 0.7–3.1)
LYMPHOCYTES NFR BLD AUTO: 16.1 % (ref 19.6–45.3)
MCH RBC QN AUTO: 28.4 PG (ref 26.6–33)
MCHC RBC AUTO-ENTMCNC: 33.4 G/DL (ref 31.5–35.7)
MCV RBC AUTO: 84.9 FL (ref 79–97)
MONOCYTES # BLD AUTO: 0.69 10*3/MM3 (ref 0.1–0.9)
MONOCYTES NFR BLD AUTO: 9.2 % (ref 5–12)
NEUTROPHILS # BLD AUTO: 5.2 10*3/MM3 (ref 1.4–7)
NEUTROPHILS NFR BLD AUTO: 69.2 % (ref 42.7–76)
NRBC BLD AUTO-RTO: 0 /100 WBC (ref 0–0)
PLATELET # BLD AUTO: 238 10*3/MM3 (ref 140–450)
PMV BLD AUTO: 10.6 FL (ref 6–12)
RBC # BLD AUTO: 4.9 10*6/MM3 (ref 4.14–5.8)
WBC NRBC COR # BLD: 7.51 10*3/MM3 (ref 3.4–10.8)

## 2019-03-08 PROCEDURE — 99215 OFFICE O/P EST HI 40 MIN: CPT | Performed by: INTERNAL MEDICINE

## 2019-03-08 PROCEDURE — 85025 COMPLETE CBC W/AUTO DIFF WBC: CPT | Performed by: INTERNAL MEDICINE

## 2019-03-08 PROCEDURE — 77470 SPECIAL RADIATION TREATMENT: CPT | Performed by: RADIOLOGY

## 2019-03-08 PROCEDURE — 77334 RADIATION TREATMENT AID(S): CPT | Performed by: RADIOLOGY

## 2019-03-08 RX ORDER — CYCLOBENZAPRINE HCL 5 MG
10 TABLET ORAL
COMMUNITY
Start: 2018-11-09 | End: 2019-06-13

## 2019-03-08 RX ORDER — SODIUM CHLORIDE 9 MG/ML
250 INJECTION, SOLUTION INTRAVENOUS ONCE
Status: CANCELLED | OUTPATIENT
Start: 2019-03-20

## 2019-03-08 NOTE — PROGRESS NOTES
[facility] OUTPATIENT FOLLOW UP CLINIC VISIT    REASON FOR FOLLOW-UP:    1.  Metastatic BRAF/NRAS mutation negative melanoma with metastatic disease present in the lung, liver bone, and brain at diagnosis.  2.  History of rheumatoid arthritis on therapy with Rituxan  3.  Right upper quadrant abdominal pain secondary to metastatic melanoma  4.  He was seen by Dr. Islas at [facility] with recommendations for ipi/nivo following stereotactic radiosurgery for his brain  5.  He was seen by Dr. Rosado with radiation oncology with plans for stereotactic radiosurgery to his 2 brain lesions with treatment planning initiated on 3/8/2019.    HISTORY OF PRESENT ILLNESS:  Gelacio Cunningham is a 63 y.o. male who returns today for follow up of the above issue.  He continues to have some mild right upper quadrant abdominal pain but he denies any other symptoms including neurologic symptoms at this point.  He is not really requiring any medication for his pain.  He is eating well.  His energy level has been adequate.    He did see Dr. Islas at [facility] for recommendations regarding treatment considering his rheumatoid arthritis requiring Rituxan.    After his last appointment he had a brain MRI that did show evidence for metastatic disease.  He saw Dr. Rosado with plans for stereotactic radiosurgery to be initiated next week.  Treatment planning will occur later today.    ONCOLOGIC HISTORY:  The patient has a past medical history significant for hypertension and rheumatoid arthritis.  For his rheumatoid arthritis he has been treated with a number of agents in the past including methotrexate, Enbrel, Humira, and more recently rituximab which was started around 1-1/2 years ago with his most recent treatment administered around one month ago.  He has some chronic diffuse arthralgias which are reasonably well-controlled on rituximab.  He has also a history of cutaneous melanoma involving his  right ear.  This was diagnosed initially by his dermatologist Dr. Aggarwal.  He was referred to Dr. Vgea and underwent surgery at [facility] in late 2017 with wide resection of the lesion and sentinel lymph node biopsy.  The patient reports that the margin was positive from the resection however lymph nodes were negative.  He had a second surgery with negative margins.  He did not receive any adjuvant therapy.     Recently, the patient developed symptoms around 2 weeks ago with mild nausea and loss of appetite.  He then developed around 1 week ago right-sided abdominal pain.  He presented to his primary care physician and underwent a right upper quadrant ultrasound on 1/24/19 showing multiple liver masses the largest of which measured 7.7 cm in the right hepatic lobe.  He was admitted and underwent CT of the abdomen and pelvis on 1/24/19 which confirmed multiple hepatic masses worrisome for metastatic disease with the largest lesion in the right lobe measuring 8.3 cm.  There was a 1 cm sclerotic nodule in the left sacral ala which was possibly related to a bone island.  There was a subtle area of narrowing around the mid transverse colon which was felt to possibly be related to wall thickening.  General surgery has been consulted and there are plans to pursue colonoscopy tomorrow.  He has undergone prior colonoscopy 12/23/16 with a single right-sided polyp removed.  He has now undergone CT chest 1/25/19 which showed 3 small pulmonary nodules in the left upper lobe measuring up to 9 mm which are indeterminate and a small 1 cm nodule in the left lower lobe which may be focal atelectasis.  There were some trace bilateral pleural effusions.  The patient did undergo CT-guided liver biopsy 1/25/19 with pathology non-diagnostic.    He was discharged from the hospital.  He subsequently had a PET scan on 2/1/2019 which allowed us to target a different liver lesion that was not necrotic.  He did have a liver  "biopsy on 2/8/2019 with results consistent with metastatic melanoma.  The biopsy also showed evidence for acute cholangitis, increased portal fibrosis with mixed inflammation with lymphocytes and eosinophils as well as sinusoidal dilatation consistent with cardiac etiology.    A brain MRI on 2/25/2019 showed 2 brain lesions consistent with metastatic disease.  The largest is in the left frontal lobe measuring 3.8 cm with some evidence for hemorrhage and some vasogenic edema.  There is a smaller 5-6 mm lesion in the right parietal lobe.  He was seen by Dr. Rosado with plans for stereotactic radiosurgery with planning on 3/8/2019.      ALLERGIES:  Allergies   Allergen Reactions   • Sulindac Diarrhea       MEDICATIONS:  The medication list has been reviewed with the patient by the medical assistant, and the list has been updated in the electronic medical record, which I reviewed.  Medication dosages and frequencies were confirmed to be accurate.    REVIEW OF SYSTEMS:  PAIN:  See Vital Signs below.  GENERAL:  No fevers, chills, night sweats, or unintended weight loss.  SKIN:  No rashes or non-healing lesions.  HEME/LYMPH:  No abnormal bleeding.  No palpable lymphadenopathy.  EYES:  No vision changes or diplopia.  ENT:  No sore throat or difficulty swallowing.  RESPIRATORY:  No cough, shortness of breath, hemoptysis, or wheezing.  CARDIOVASCULAR:  No chest pain, palpitations, orthopnea, or dyspnea on exertion.  GASTROINTESTINAL: Mild right upper quadrant abdominal pain  GENITOURINARY:  No dysuria or hematuria.  MUSCULOSKELETAL:  No joint pain, swelling, or erythema.  NEUROLOGIC:  No dizziness, loss of consciousness, or seizures.  PSYCHIATRIC:  No depression, anxiety, or mood changes.    Vitals:    03/08/19 0750   BP: 116/72   Pulse: 56   Resp: 16   Temp: 97.6 °F (36.4 °C)   TempSrc: Oral   SpO2: 96%   Weight: 87.4 kg (192 lb 9.6 oz)   Height: 168 cm (66.14\")   PainSc: 0-No pain  Comment: melenoma to liver       PHYSICAL " EXAMINATION:  GENERAL:  Well-developed well-nourished male; awake, alert and oriented, in no acute distress.  SKIN:  Warm and dry, without rashes, purpura, or petechiae.  HEAD:  Normocephalic, atraumatic.  EYES:  Pupils equal, round and reactive to light.  Extraocular movements intact.  Conjunctivae normal.  EARS:  Hearing intact.  NOSE:  Septum midline.  No excoriations or nasal discharge.  MOUTH:  No stomatitis or ulcers.  Lips are normal.  THROAT:  Oropharynx without lesions or exudates.  NECK:  Supple with good range of motion; no thyromegaly or masses; no JVD or bruits.  LYMPHATICS:  No cervical, supraclavicular, axillary, or inguinal lymphadenopathy.  CHEST:  Lungs are clear to auscultation bilaterally.  No wheezes, rales, or rhonchi.  HEART:  Regular rate; normal rhythm.  No murmurs, gallops or rubs.  ABDOMEN: Soft, mild right upper quadrant tenderness to palpation without rebound or guarding.  Normal active bowel sounds.  EXTREMITIES:  No clubbing, cyanosis, or edema.  NEUROLOGICAL:  No focal neurologic deficits.    DIAGNOSTIC DATA:  Results for orders placed or performed in visit on 03/08/19   CBC Auto Differential   Result Value Ref Range    WBC 7.51 3.40 - 10.80 10*3/mm3    RBC 4.90 4.14 - 5.80 10*6/mm3    Hemoglobin 13.9 13.0 - 17.7 g/dL    Hematocrit 41.6 37.5 - 51.0 %    MCV 84.9 79.0 - 97.0 fL    MCH 28.4 26.6 - 33.0 pg    MCHC 33.4 31.5 - 35.7 g/dL    RDW 13.7 12.3 - 15.4 %    RDW-SD 42.7 37.0 - 54.0 fl    MPV 10.6 6.0 - 12.0 fL    Platelets 238 140 - 450 10*3/mm3    Neutrophil % 69.2 42.7 - 76.0 %    Lymphocyte % 16.1 (L) 19.6 - 45.3 %    Monocyte % 9.2 5.0 - 12.0 %    Eosinophil % 4.0 0.3 - 6.2 %    Basophil % 0.4 0.0 - 1.5 %    Immature Grans % 1.1 (H) 0.0 - 0.5 %    Neutrophils, Absolute 5.20 1.40 - 7.00 10*3/mm3    Lymphocytes, Absolute 1.21 0.70 - 3.10 10*3/mm3    Monocytes, Absolute 0.69 0.10 - 0.90 10*3/mm3    Eosinophils, Absolute 0.30 0.00 - 0.40 10*3/mm3    Basophils, Absolute 0.03 0.00 -  0.20 10*3/mm3    Immature Grans, Absolute 0.08 (H) 0.00 - 0.05 10*3/mm3    nRBC 0.0 0.0 - 0.0 /100 WBC       IMAGING: I personally reviewed his PET scan images from 2/1/2019.  Multiple hypermetabolic bone lesions as well as large confluent liver lesions and a hypermetabolic pulmonary nodules present.    FINDINGS: The only hypermetabolic pulmonary nodule is at the left upper  lobe measuring 1.0 cm with a maximal SUV of 6.7. The 2 subcentimeter  pulmonary nodules are photopenic, but are likely too small for PET  characterization. There is a tiny lytic lesion within the spine of the  left scapula with a maximal SUV of 8.0. There is a tiny hypermetabolic  focus within the cortex of the right humeral neck with a maximal SUV of  4.4. There is a tiny lytic lesion at the posterior cortex of the T8  vertebral body measuring 0.7 cm and the maximal SUV is 6.3. There are  also tiny hypermetabolic lytic lesions at the left aspect of L3 and at  the anterior superior aspect of the sacrum with a maximal SUV of 12.7.  The L3 lytic lesion measures approximately 1 cm while the left para  midline hypermetabolic sacral lesion is nearly inconspicuous. There is a  tiny focus of hypermetabolic activity within the right ischium with a  maximal SUV of 6.6. No definite lesion is seen on the corresponding CT  sequence. There are multiple hypermetabolic liver lesions, the  hypermetabolic foci within the liver surround larger lesions which are  likely necrotic. A confluent approximately 8 cm right hepatic lobe liver  lesion is for the most part photopenic, but has peripheral nodules of  hypermetabolic activity, maximal SUV of 14.1. There is no suspicious  hypermetabolic activity within the adrenal glands and there is no  hypermetabolic lymphadenopathy within the abdomen or pelvis.     IMPRESSION:  There are multiple hypermetabolic bone lesions which likely  represent metastases. Some of the lesions are lytic while others are  nearly  inconspicuous on the corresponding CT. The large confluent liver  lesions are predominantly necrotic with hypermetabolic nodules in the  periphery. One of the pulmonary nodules is hypermetabolic and the  subcentimeter nodules are likely too small for PET characterization. For  tissue diagnosis, CT-guided biopsy of the periphery of the dominant  liver lesions or the 1 cm left upper lobe pulmonary nodule should be  considered.    MRI brain images from 2/25/2019 images personally reviewed.  2 brain lesions are present.    IMPRESSION:     There are findings consistent with intracranial metastatic disease. The  largest of these metastatic lesions is noted within the anterior portion  of the left frontal lobe and measures up to approximately 3.8 x 2.3 x  3.3 cm in greatest dimensions. The smaller metastatic lesion is noted  within the medial portion right parietal lobe measuring up to 5-6 mm in  diameter. Again, these lesions demonstrate T1 shortening which can be  seen simply with melanin or may be seen in the setting of blood  products. However, ultimately, I do suspect that these metastatic  lesions are hemorrhagic. There is some vasogenic edema which  circumscribes these lesions resulting in some localized sulcal  effacement principally at the level of the anterior portion of the left  frontal lobe. However, there is no evidence for midline or compartmental  shift.    ASSESSMENT:  This is a 63 y.o. male with:  1. Metastatic melanoma:  · Patient with recent onset of symptoms including nausea, anorexia, right-sided abdominal pain over the 2 weeks preceeding admission in January 2019  · Abnormal right upper quadrant ultrasound showing multiple liver masses 1/24/19.  · CT abdomen and pelvis 1/24/19 confirmed multiple hepatic masses worrisome for metastatic disease with the largest lesion in the right lobe measuring 8.3 cm.  There was a 1 cm sclerotic nodule in the left sacral ala which was possibly related to a bone  island.  There was a subtle area of narrowing around the mid transverse colon which was felt to possibly be related to wall thickening.  · CT chest 1/25/19 showed 3 small pulmonary nodules in the left upper lobe measuring up to 9 mm which are indeterminate and a small 1 cm nodule in the left lower lobe which may be focal atelectasis.  There were some trace bilateral pleural effusions.   · CT-guided liver biopsy 1/25/19 with pathology only showing blood.   · Colonoscopy by Dr. Rashid on 1/27 negative.  CEA 1/25/19 was normal at 1.4.   · Given the patient's history of melanoma in late 2017, there was concern regarding possible metastatic melanoma as well.  · Colonoscopy 1/27/2019 negative for malignancy.  · Discussed with Dr. Richardson with radiology.  Outpatient PET with repeat liver biopsy based on these results  · PET performed 2/1/2019  · Repeat liver biopsy on 2/8/2019 confirms metastatic melanoma with evidence also for sinusoidal dilatation consistent with cardiac etiology, increased portal fibrosis with mixed inflammation with lymphocytes and eosinophils, and acute cholangitis.  · BRAF/NRAS mutation negative  · Brain MRI on 2/25/2019 with 2 metastatic lesions.  One measures 3.8 cm in the left frontal lobe and the other measures 5-6 mm in the right parietal lobe.    · He saw Dr. Rosado.  Stereotactic radiosurgery planned.  2. Rheumatoid arthritis:  · The patient has long-standing disease treated in the past with methotrexate, Enbrel, Humira, and more recently rituximab which was started around 1-1/2 years ago with his most recent treatment administered around late December 2018.    · He has chronic diffuse arthralgias which are reasonably well-controlled on rituximab.  · As above, as metastatic melanoma is identified, this issue may complicate use of immunotherapy.  3. History of cutaneous melanoma of the right ear:  · Diagnosed initially by his dermatologist Dr. Aggarwal, appears to have been in the  pre-auricular region.  Pathology reviewed from initial biopsy showing Breslow 0.82 mm, non-ulcerated lesion with low mitotic rate.  · He was referred to Dr. Vega and underwent surgery at [facility] in December 2017 with wide resection of the lesion and sentinel lymph node biopsy.  We do not yet have that pathology report however per notes available, there was a residual area of melanoma and sentinel nodes were negative.  He therefore underwent a second procedure for wide excision with presumably negative margins.     · He did not receive any adjuvant therapy.  · Right ear and neck normal on exam currently.  4. Right upper quadrant abdominal pain: Resolved at this point    PLAN:  1.  He will complete stereotactic radiosurgery to his brain lesions per Dr. Rosado over the next couple of weeks.  2.  Following that, we will plan to initiate palliative therapy with Opdivo and Yervoy.  Standard dosing.  The patient was given some written information regarding this today and I outlined potential adverse effects with him including the possibility of fatigue, dermatitis, thyroiditis, pneumonitis, myocarditis, hepatitis, colitis, gastritis, among others.  He agrees to proceed.  He will have formal education session with 1 of our nurse practitioners and we will plan to get him going soon after he completes his stereotactic radiosurgery.  Opdivo at 1 mg/kg, Yervoy at 3 mg/kg intravenously every 3 weeks for 4 cycles followed by Opdivo 480 mg every 4 weeks.  Imaging after his second cycle of Opdivo abd Yervoy  3.  If his rheumatoid arthritis flairs, prednisone 10 mg daily.

## 2019-03-12 PROCEDURE — 77301 RADIOTHERAPY DOSE PLAN IMRT: CPT | Performed by: RADIOLOGY

## 2019-03-12 PROCEDURE — 77338 DESIGN MLC DEVICE FOR IMRT: CPT | Performed by: RADIOLOGY

## 2019-03-13 ENCOUNTER — RADIATION ONCOLOGY WEEKLY ASSESSMENT (OUTPATIENT)
Dept: RADIATION ONCOLOGY | Facility: HOSPITAL | Age: 64
End: 2019-03-13

## 2019-03-13 DIAGNOSIS — C79.31 BRAIN METASTASES: Primary | ICD-10-CM

## 2019-03-13 PROCEDURE — 77435 SBRT MANAGEMENT: CPT | Performed by: RADIOLOGY

## 2019-03-13 PROCEDURE — FACE2FACE: Performed by: RADIOLOGY

## 2019-03-13 PROCEDURE — 77300 RADIATION THERAPY DOSE PLAN: CPT | Performed by: RADIOLOGY

## 2019-03-13 PROCEDURE — 77373 STRTCTC BDY RAD THER TX DLVR: CPT | Performed by: RADIOLOGY

## 2019-03-13 NOTE — PROGRESS NOTES
"  Physician Stereotactic Management Note    Diagnosis:     1. Brain metastases (CMS/HCC)        Doing well pretreatment, no problems.    Treatment Number and Dose:   SRS/SRT to 2 separate lesions.    # 1/1 medial right parietal lobe.  24 Gy                                                                                                                       #1/5 left frontal lobe    6/30 Gy  I was personally present at the machine for the delivery of the above treatment.     I provided direct supervision of the patient's set up, treatment parameters and image guidance. I provided corrections and approval of the above prior to the treatment, in real time. I was present for any adjustments required due to patient motion, target movement or equipment issues.    The ongoing images for localization, tumor tracking, gating and beam's eye view localization images will also be approved.     Notes on treatment course, films, medical progress and plan:    Doing well. Tolerated treatment without difficulty. No problems or questions.    Problem added:  None  Medications added:   None  Ancillary referrals made: None    I have reviewed and marked as \"reviewed\" the current medications, allergies and problem list in the patients EMR.    Patient's Care Team:  Patient Care Team:  Favio Guaman MD as PCP - General  Albin Hummel MD as Consulting Physician (Hematology and Oncology)  Cleveland Rosado MD as Consulting Physician (Radiation Oncology)      "

## 2019-03-14 ENCOUNTER — OFFICE VISIT (OUTPATIENT)
Dept: ONCOLOGY | Facility: CLINIC | Age: 64
End: 2019-03-14

## 2019-03-14 ENCOUNTER — APPOINTMENT (OUTPATIENT)
Dept: LAB | Facility: HOSPITAL | Age: 64
End: 2019-03-14

## 2019-03-14 VITALS — BODY MASS INDEX: 31.02 KG/M2 | WEIGHT: 193 LBS

## 2019-03-14 DIAGNOSIS — M05.89 OTHER RHEUMATOID ARTHRITIS WITH RHEUMATOID FACTOR OF MULTIPLE SITES (HCC): ICD-10-CM

## 2019-03-14 DIAGNOSIS — C78.7 METASTATIC MELANOMA TO LIVER (HCC): Primary | ICD-10-CM

## 2019-03-14 PROCEDURE — 99215 OFFICE O/P EST HI 40 MIN: CPT | Performed by: NURSE PRACTITIONER

## 2019-03-14 PROCEDURE — G0463 HOSPITAL OUTPT CLINIC VISIT: HCPCS | Performed by: NURSE PRACTITIONER

## 2019-03-14 RX ORDER — ONDANSETRON HYDROCHLORIDE 8 MG/1
8 TABLET, FILM COATED ORAL EVERY 8 HOURS PRN
Qty: 30 TABLET | Refills: 2 | Status: SHIPPED | OUTPATIENT
Start: 2019-03-14 | End: 2019-08-22 | Stop reason: SDUPTHER

## 2019-03-14 NOTE — PROGRESS NOTES
Subjective     PATIENT NAME:  Gelacio Cunningham  YOB: 1955  PATIENTS AGE:  63 y.o.  PATIENTS SEX:  male  DATE OF SERVICE:  03/14/2019  PROVIDER:  GUSTABO May      ____________________PATIENT EDUCATION____________________    PATIENT EDUCATION:  Today I met with the patient to discuss the chemotherapy regimen recommended for treatment of his disease.  The patient was given explanation of treatment premed side effects including office policy that prohibits patients to drive if sedating medications are administered, MD explanation given regarding benefits, side effects, toxicities and goals of treatment.  The patient received a Chemotherapy/Biotherapy Plan Summary including diagnosis and specific treatment plan.    SIDE EFFECTS:  Common side effects were discussed with the patient and his wife.  Discussion included hair loss/discoloration, anemia/fatigue, infection/chills/fever, appetite, bleeding risk/precautions, constipation, diarrhea, mouth sores, taste alteration, loss of appetite,nausea/vomiting, peripheral neuropathy, skin/nail changes, rash, muscle aches/weakness, photosensitivity, weight gain/loss, hearing loss, sterility, high blood pressure, heart damage, liver damage, lung damage, kidney damage, DVT/PE risk, fluid retention, pleural/pericardial effusion, somnolence, electrolyte/LFT imbalance.    Discussion also included side effects specific to drugs in the treatment plan, Opdivo and Yervoy specifically.      A total of 45 minutes were spent with the patient, with 100% of time spent in education and counseling.

## 2019-03-15 ENCOUNTER — RADIATION ONCOLOGY WEEKLY ASSESSMENT (OUTPATIENT)
Dept: RADIATION ONCOLOGY | Facility: HOSPITAL | Age: 64
End: 2019-03-15

## 2019-03-15 DIAGNOSIS — C79.31 BRAIN METASTASES: Primary | ICD-10-CM

## 2019-03-15 PROCEDURE — FACE2FACE: Performed by: RADIOLOGY

## 2019-03-15 PROCEDURE — 77373 STRTCTC BDY RAD THER TX DLVR: CPT | Performed by: RADIOLOGY

## 2019-03-15 NOTE — PROGRESS NOTES
"  Physician Stereotactic Management Note    Diagnosis:     1. Brain metastases (CMS/HCC)        Doing well pretreatment, no problems.    Treatment Number and Dose:    Left frontal lobe    Treatment #2/5       12/30 Gy.    I was personally present at the machine for the delivery of the above treatment.     I provided direct supervision of the patient's set up, treatment parameters and image guidance. I provided corrections and approval of the above prior to the treatment, in real time. I was present for any adjustments required due to patient motion, target movement or equipment issues.    The ongoing images for localization, tumor tracking, gating and beam's eye view localization images will also be approved.     Notes on treatment course, films, medical progress and plan:    Doing well. Tolerated treatment without difficulty. No problems or questions.    Problem added:  None  Medications added:   None  Ancillary referrals made: None    I have reviewed and marked as \"reviewed\" the current medications, allergies and problem list in the patients EMR.    Patient's Care Team:  Patient Care Team:  Favio Guaman MD as PCP - Albin Pacheco MD as Consulting Physician (Hematology and Oncology)  Cleveland Rosado MD as Consulting Physician (Radiation Oncology)      "

## 2019-03-19 ENCOUNTER — RADIATION ONCOLOGY WEEKLY ASSESSMENT (OUTPATIENT)
Dept: RADIATION ONCOLOGY | Facility: HOSPITAL | Age: 64
End: 2019-03-19

## 2019-03-19 DIAGNOSIS — C78.7 METASTATIC MELANOMA TO LIVER (HCC): ICD-10-CM

## 2019-03-19 DIAGNOSIS — C79.31 BRAIN METASTASES: Primary | ICD-10-CM

## 2019-03-19 PROCEDURE — 77373 STRTCTC BDY RAD THER TX DLVR: CPT | Performed by: RADIOLOGY

## 2019-03-19 PROCEDURE — FACE2FACE: Performed by: RADIOLOGY

## 2019-03-19 NOTE — PROGRESS NOTES
"  Physician Stereotactic Management Note    Diagnosis:     1. Brain metastases (CMS/HCC)        Doing well pretreatment, no problems.    Treatment Number and Dose: fx 3/5 srt to brain, dose 18Gy of 30gy    I was personally present at the machine for the delivery of the above treatment.     I provided direct supervision of the patient's set up, treatment parameters and image guidance. I provided corrections and approval of the above prior to the treatment, in real time. I was present for any adjustments required due to patient motion, target movement or equipment issues.    The ongoing images for localization, tumor tracking, gating and beam's eye view localization images will also be approved.     Notes on treatment course, films, medical progress and plan:    Doing well. Tolerated treatment without difficulty. No problems or questions.    Problem added:  None  Medications added:   None  Ancillary referrals made: None    I have reviewed and marked as \"reviewed\" the current medications, allergies and problem list in the patients EMR.    Patient's Care Team:  Patient Care Team:  Favio Guaman MD as PCP - Albin Pacheco MD as Consulting Physician (Hematology and Oncology)  Cleveland Rosado MD as Consulting Physician (Radiation Oncology)        "

## 2019-03-20 ENCOUNTER — INFUSION (OUTPATIENT)
Dept: ONCOLOGY | Facility: HOSPITAL | Age: 64
End: 2019-03-20

## 2019-03-20 ENCOUNTER — LAB (OUTPATIENT)
Dept: LAB | Facility: HOSPITAL | Age: 64
End: 2019-03-20

## 2019-03-20 ENCOUNTER — OFFICE VISIT (OUTPATIENT)
Dept: ONCOLOGY | Facility: CLINIC | Age: 64
End: 2019-03-20

## 2019-03-20 ENCOUNTER — RADIATION ONCOLOGY WEEKLY ASSESSMENT (OUTPATIENT)
Dept: RADIATION ONCOLOGY | Facility: HOSPITAL | Age: 64
End: 2019-03-20

## 2019-03-20 VITALS
OXYGEN SATURATION: 98 % | SYSTOLIC BLOOD PRESSURE: 161 MMHG | RESPIRATION RATE: 18 BRPM | WEIGHT: 193.6 LBS | TEMPERATURE: 98.2 F | HEART RATE: 53 BPM | DIASTOLIC BLOOD PRESSURE: 79 MMHG | BODY MASS INDEX: 31.12 KG/M2 | HEIGHT: 66 IN

## 2019-03-20 DIAGNOSIS — C78.7 METASTATIC MELANOMA TO LIVER (HCC): Primary | ICD-10-CM

## 2019-03-20 DIAGNOSIS — C79.31 BRAIN METASTASES: Primary | ICD-10-CM

## 2019-03-20 DIAGNOSIS — C78.7 METASTATIC MELANOMA TO LIVER (HCC): ICD-10-CM

## 2019-03-20 LAB
ALBUMIN SERPL-MCNC: 4.2 G/DL (ref 3.5–5.2)
ALBUMIN/GLOB SERPL: 1.5 G/DL (ref 1.1–2.4)
ALP SERPL-CCNC: 140 U/L (ref 38–116)
ALT SERPL W P-5'-P-CCNC: 18 U/L (ref 0–41)
ANION GAP SERPL CALCULATED.3IONS-SCNC: 10.2 MMOL/L
AST SERPL-CCNC: 24 U/L (ref 0–40)
BASOPHILS # BLD AUTO: 0.02 10*3/MM3 (ref 0–0.2)
BASOPHILS NFR BLD AUTO: 0.3 % (ref 0–1.5)
BILIRUB SERPL-MCNC: 0.4 MG/DL (ref 0.2–1.2)
BUN BLD-MCNC: 8 MG/DL (ref 6–20)
BUN/CREAT SERPL: 7.6 (ref 7.3–30)
CALCIUM SPEC-SCNC: 9.2 MG/DL (ref 8.5–10.2)
CHLORIDE SERPL-SCNC: 102 MMOL/L (ref 98–107)
CO2 SERPL-SCNC: 28.8 MMOL/L (ref 22–29)
CORTIS SERPL-MCNC: 11.5 MCG/DL
CREAT BLD-MCNC: 1.05 MG/DL (ref 0.7–1.3)
DEPRECATED RDW RBC AUTO: 43.6 FL (ref 37–54)
EOSINOPHIL # BLD AUTO: 0.2 10*3/MM3 (ref 0–0.4)
EOSINOPHIL NFR BLD AUTO: 3.2 % (ref 0.3–6.2)
ERYTHROCYTE [DISTWIDTH] IN BLOOD BY AUTOMATED COUNT: 13.4 % (ref 12.3–15.4)
GFR SERPL CREATININE-BSD FRML MDRD: 72 ML/MIN/1.73
GLOBULIN UR ELPH-MCNC: 2.8 GM/DL (ref 1.8–3.5)
GLUCOSE BLD-MCNC: 111 MG/DL (ref 74–124)
HCT VFR BLD AUTO: 42.7 % (ref 37.5–51)
HGB BLD-MCNC: 13.3 G/DL (ref 13–17.7)
IMM GRANULOCYTES # BLD AUTO: 0.04 10*3/MM3 (ref 0–0.05)
IMM GRANULOCYTES NFR BLD AUTO: 0.6 % (ref 0–0.5)
LYMPHOCYTES # BLD AUTO: 1.08 10*3/MM3 (ref 0.7–3.1)
LYMPHOCYTES NFR BLD AUTO: 17.2 % (ref 19.6–45.3)
MCH RBC QN AUTO: 27.5 PG (ref 26.6–33)
MCHC RBC AUTO-ENTMCNC: 31.1 G/DL (ref 31.5–35.7)
MCV RBC AUTO: 88.2 FL (ref 79–97)
MONOCYTES # BLD AUTO: 0.55 10*3/MM3 (ref 0.1–0.9)
MONOCYTES NFR BLD AUTO: 8.8 % (ref 5–12)
NEUTROPHILS # BLD AUTO: 4.39 10*3/MM3 (ref 1.4–7)
NEUTROPHILS NFR BLD AUTO: 69.9 % (ref 42.7–76)
NRBC BLD AUTO-RTO: 0 /100 WBC (ref 0–0)
PLATELET # BLD AUTO: 264 10*3/MM3 (ref 140–450)
PMV BLD AUTO: 10.3 FL (ref 6–12)
POTASSIUM BLD-SCNC: 4.2 MMOL/L (ref 3.5–4.7)
PROT SERPL-MCNC: 7 G/DL (ref 6.3–8)
RBC # BLD AUTO: 4.84 10*6/MM3 (ref 4.14–5.8)
SODIUM BLD-SCNC: 141 MMOL/L (ref 134–145)
T4 FREE SERPL-MCNC: 1.16 NG/DL (ref 0.93–1.7)
TSH SERPL DL<=0.05 MIU/L-ACNC: 1.49 MIU/ML (ref 0.27–4.2)
WBC NRBC COR # BLD: 6.28 10*3/MM3 (ref 3.4–10.8)

## 2019-03-20 PROCEDURE — 84443 ASSAY THYROID STIM HORMONE: CPT | Performed by: INTERNAL MEDICINE

## 2019-03-20 PROCEDURE — 77373 STRTCTC BDY RAD THER TX DLVR: CPT | Performed by: RADIOLOGY

## 2019-03-20 PROCEDURE — 96417 CHEMO IV INFUS EACH ADDL SEQ: CPT | Performed by: INTERNAL MEDICINE

## 2019-03-20 PROCEDURE — 85025 COMPLETE CBC W/AUTO DIFF WBC: CPT | Performed by: INTERNAL MEDICINE

## 2019-03-20 PROCEDURE — 25010000002 IPILIMUMAB 200 MG/40ML SOLUTION 40 ML VIAL: Performed by: INTERNAL MEDICINE

## 2019-03-20 PROCEDURE — 25010000002 IPILIMUMAB 50 MG/10ML SOLUTION 10 ML VIAL: Performed by: INTERNAL MEDICINE

## 2019-03-20 PROCEDURE — 96415 CHEMO IV INFUSION ADDL HR: CPT | Performed by: INTERNAL MEDICINE

## 2019-03-20 PROCEDURE — 36415 COLL VENOUS BLD VENIPUNCTURE: CPT | Performed by: INTERNAL MEDICINE

## 2019-03-20 PROCEDURE — 96413 CHEMO IV INFUSION 1 HR: CPT | Performed by: INTERNAL MEDICINE

## 2019-03-20 PROCEDURE — 25010000002 NIVOLUMAB 100 MG/10ML SOLUTION 10 ML VIAL: Performed by: INTERNAL MEDICINE

## 2019-03-20 PROCEDURE — 80053 COMPREHEN METABOLIC PANEL: CPT | Performed by: INTERNAL MEDICINE

## 2019-03-20 PROCEDURE — 99214 OFFICE O/P EST MOD 30 MIN: CPT | Performed by: NURSE PRACTITIONER

## 2019-03-20 PROCEDURE — 82533 TOTAL CORTISOL: CPT | Performed by: INTERNAL MEDICINE

## 2019-03-20 PROCEDURE — 84439 ASSAY OF FREE THYROXINE: CPT | Performed by: INTERNAL MEDICINE

## 2019-03-20 RX ORDER — SODIUM CHLORIDE 9 MG/ML
250 INJECTION, SOLUTION INTRAVENOUS ONCE
Status: COMPLETED | OUTPATIENT
Start: 2019-03-20 | End: 2019-03-20

## 2019-03-20 RX ADMIN — SODIUM CHLORIDE 260 MG: 9 INJECTION, SOLUTION INTRAVENOUS at 11:22

## 2019-03-20 RX ADMIN — SODIUM CHLORIDE 90 MG: 9 INJECTION, SOLUTION INTRAVENOUS at 10:38

## 2019-03-20 RX ADMIN — SODIUM CHLORIDE 250 ML: 9 INJECTION, SOLUTION INTRAVENOUS at 10:00

## 2019-03-20 NOTE — PROGRESS NOTES
"  Physician Stereotactic Management Note    Diagnosis:     1. Brain metastases (CMS/HCC)        Doing well pretreatment, no problems.    Treatment Number and Dose:fx 4/5 left frontal lobe    I was personally present at the machine for the delivery of the above treatment.     I provided direct supervision of the patient's set up, treatment parameters and image guidance. I provided corrections and approval of the above prior to the treatment, in real time. I was present for any adjustments required due to patient motion, target movement or equipment issues.    The ongoing images for localization, tumor tracking, gating and beam's eye view localization images will also be approved.     Notes on treatment course, films, medical progress and plan:    Doing well. Tolerated treatment without difficulty. No problems or questions.    Problem added:  None  Medications added:   None  Ancillary referrals made: None    I have reviewed and marked as \"reviewed\" the current medications, allergies and problem list in the patients EMR.    Patient's Care Team:  Patient Care Team:  Favio Guaman MD as PCP - Albin Pacheco MD as Consulting Physician (Hematology and Oncology)  Cleveland Rosado MD as Consulting Physician (Radiation Oncology)      "

## 2019-03-20 NOTE — PROGRESS NOTES
[facility] OUTPATIENT FOLLOW UP CLINIC VISIT    REASON FOR FOLLOW-UP:    1.  Metastatic BRAF/NRAS mutation negative melanoma with metastatic disease present in the lung, liver bone, and brain at diagnosis.  2.  History of rheumatoid arthritis on therapy with Rituxan  3.  Right upper quadrant abdominal pain secondary to metastatic melanoma  4.  He was seen by Dr. Islas at [facility] with recommendations for ipi/nivo following stereotactic radiosurgery for his brain  5.  He was seen by Dr. Rosado with radiation oncology with plans for stereotactic radiosurgery to his 2 brain lesions with treatment planning initiated on 3/8/2019.  6. Opdivo Yervoy initiated on 3/20/2019.    HISTORY OF PRESENT ILLNESS:  Gelacio Cunningham is a 63 y.o. male   With the above medical history here today to initiate Yervoy Optiva.  He is wrapping up his radiosurgery with his last treatment slated for this Friday.  He reports feeling well with no new questions or concerns.    He did undergo formal chemotherapy education with me on March 14, 2019.    Vital signs are stable.  His CBC is stable.  He is anxious to get started with treatment.    ONCOLOGIC HISTORY:  The patient has a past medical history significant for hypertension and rheumatoid arthritis.  For his rheumatoid arthritis he has been treated with a number of agents in the past including methotrexate, Enbrel, Humira, and more recently rituximab which was started around 1-1/2 years ago with his most recent treatment administered around one month ago.  He has some chronic diffuse arthralgias which are reasonably well-controlled on rituximab.  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  This was diagnosed initially by his dermatologist Dr. Aggarwal.  He was referred to Dr. Vega and underwent surgery at [facility] in late 2017 with wide resection of the lesion and sentinel lymph node biopsy.  The patient  reports that the margin was positive from the resection however lymph nodes were negative.  He had a second surgery with negative margins.  He did not receive any adjuvant therapy.     Recently, the patient developed symptoms around 2 weeks ago with mild nausea and loss of appetite.  He then developed around 1 week ago right-sided abdominal pain.  He presented to his primary care physician and underwent a right upper quadrant ultrasound on 1/24/19 showing multiple liver masses the largest of which measured 7.7 cm in the right hepatic lobe.  He was admitted and underwent CT of the abdomen and pelvis on 1/24/19 which confirmed multiple hepatic masses worrisome for metastatic disease with the largest lesion in the right lobe measuring 8.3 cm.  There was a 1 cm sclerotic nodule in the left sacral ala which was possibly related to a bone island.  There was a subtle area of narrowing around the mid transverse colon which was felt to possibly be related to wall thickening.  General surgery has been consulted and there are plans to pursue colonoscopy tomorrow.  He has undergone prior colonoscopy 12/23/16 with a single right-sided polyp removed.  He has now undergone CT chest 1/25/19 which showed 3 small pulmonary nodules in the left upper lobe measuring up to 9 mm which are indeterminate and a small 1 cm nodule in the left lower lobe which may be focal atelectasis.  There were some trace bilateral pleural effusions.  The patient did undergo CT-guided liver biopsy 1/25/19 with pathology non-diagnostic.    He was discharged from the hospital.  He subsequently had a PET scan on 2/1/2019 which allowed us to target a different liver lesion that was not necrotic.  He did have a liver biopsy on 2/8/2019 with results consistent with metastatic melanoma.  The biopsy also showed evidence for acute cholangitis, increased portal fibrosis with mixed inflammation with lymphocytes and eosinophils as well as sinusoidal dilatation  "consistent with cardiac etiology.    A brain MRI on 2/25/2019 showed 2 brain lesions consistent with metastatic disease.  The largest is in the left frontal lobe measuring 3.8 cm with some evidence for hemorrhage and some vasogenic edema.  There is a smaller 5-6 mm lesion in the right parietal lobe.  He was seen by Dr. Rosado with plans for stereotactic radiosurgery with planning on 3/8/2019.      ALLERGIES:  Allergies   Allergen Reactions   • Sulindac Diarrhea       MEDICATIONS:  The medication list has been reviewed with the patient by the medical assistant, and the list has been updated in the electronic medical record, which I reviewed.  Medication dosages and frequencies were confirmed to be accurate.    REVIEW OF SYSTEMS:  PAIN:  See Vital Signs below.  GENERAL:  No fevers, chills, night sweats, or unintended weight loss.  SKIN:  No rashes or non-healing lesions.  HEME/LYMPH:  No abnormal bleeding.  No palpable lymphadenopathy.  EYES:  No vision changes or diplopia.  ENT:  No sore throat or difficulty swallowing.  RESPIRATORY:  No cough, shortness of breath, hemoptysis, or wheezing.  CARDIOVASCULAR:  No chest pain, palpitations, orthopnea, or dyspnea on exertion.  GASTROINTESTINAL: Mild right upper quadrant abdominal pain  GENITOURINARY:  No dysuria or hematuria.  MUSCULOSKELETAL:  No joint pain, swelling, or erythema.  NEUROLOGIC:  No dizziness, loss of consciousness, or seizures.  PSYCHIATRIC:  No depression, anxiety, or mood changes.    Vitals:    03/20/19 0909   BP: 161/79   Pulse: 53   Resp: 18   Temp: 98.2 °F (36.8 °C)   TempSrc: Oral   SpO2: 98%   Weight: 87.8 kg (193 lb 9.6 oz)   Height: 168 cm (66.14\")   PainSc:   3   PainLoc: Comment: joint pain       PHYSICAL EXAMINATION:  GENERAL:  Well-developed well-nourished male; awake, alert and oriented, in no acute distress.  SKIN:  Warm and dry, without rashes, purpura, or petechiae.  HEAD:  Normocephalic, atraumatic.  EYES:  Pupils equal, round and " reactive to light.  Extraocular movements intact.  Conjunctivae normal.  EARS:  Hearing intact.  NOSE:  Septum midline.  No excoriations or nasal discharge.  MOUTH:  No stomatitis or ulcers.  Lips are normal.  THROAT:  Oropharynx without lesions or exudates.  NECK:  Supple with good range of motion; no thyromegaly or masses; no JVD or bruits.  LYMPHATICS:  No cervical, supraclavicular, lymphadenopathy.  CHEST:  Lungs are clear to auscultation bilaterally.  No wheezes, rales, or rhonchi.  HEART:  Regular rate; normal rhythm.  No murmurs, gallops or rubs.  ABDOMEN: Soft, mild right upper quadrant tenderness to palpation without rebound or guarding.  Normal active bowel sounds.  EXTREMITIES:  No clubbing, cyanosis, or edema.  NEUROLOGICAL:  No focal neurologic deficits.    DIAGNOSTIC DATA:  Results for orders placed or performed in visit on 03/20/19   CBC Auto Differential   Result Value Ref Range    WBC 6.28 3.40 - 10.80 10*3/mm3    RBC 4.84 4.14 - 5.80 10*6/mm3    Hemoglobin 13.3 13.0 - 17.7 g/dL    Hematocrit 42.7 37.5 - 51.0 %    MCV 88.2 79.0 - 97.0 fL    MCH 27.5 26.6 - 33.0 pg    MCHC 31.1 (L) 31.5 - 35.7 g/dL    RDW 13.4 12.3 - 15.4 %    RDW-SD 43.6 37.0 - 54.0 fl    MPV 10.3 6.0 - 12.0 fL    Platelets 264 140 - 450 10*3/mm3    Neutrophil % 69.9 42.7 - 76.0 %    Lymphocyte % 17.2 (L) 19.6 - 45.3 %    Monocyte % 8.8 5.0 - 12.0 %    Eosinophil % 3.2 0.3 - 6.2 %    Basophil % 0.3 0.0 - 1.5 %    Immature Grans % 0.6 (H) 0.0 - 0.5 %    Neutrophils, Absolute 4.39 1.40 - 7.00 10*3/mm3    Lymphocytes, Absolute 1.08 0.70 - 3.10 10*3/mm3    Monocytes, Absolute 0.55 0.10 - 0.90 10*3/mm3    Eosinophils, Absolute 0.20 0.00 - 0.40 10*3/mm3    Basophils, Absolute 0.02 0.00 - 0.20 10*3/mm3    Immature Grans, Absolute 0.04 0.00 - 0.05 10*3/mm3    nRBC 0.0 0.0 - 0.0 /100 WBC       IMAGING: I personally reviewed his PET scan images from 2/1/2019.  Multiple hypermetabolic bone lesions as well as large confluent liver lesions and a  hypermetabolic pulmonary nodules present.    FINDINGS: The only hypermetabolic pulmonary nodule is at the left upper  lobe measuring 1.0 cm with a maximal SUV of 6.7. The 2 subcentimeter  pulmonary nodules are photopenic, but are likely too small for PET  characterization. There is a tiny lytic lesion within the spine of the  left scapula with a maximal SUV of 8.0. There is a tiny hypermetabolic  focus within the cortex of the right humeral neck with a maximal SUV of  4.4. There is a tiny lytic lesion at the posterior cortex of the T8  vertebral body measuring 0.7 cm and the maximal SUV is 6.3. There are  also tiny hypermetabolic lytic lesions at the left aspect of L3 and at  the anterior superior aspect of the sacrum with a maximal SUV of 12.7.  The L3 lytic lesion measures approximately 1 cm while the left para  midline hypermetabolic sacral lesion is nearly inconspicuous. There is a  tiny focus of hypermetabolic activity within the right ischium with a  maximal SUV of 6.6. No definite lesion is seen on the corresponding CT  sequence. There are multiple hypermetabolic liver lesions, the  hypermetabolic foci within the liver surround larger lesions which are  likely necrotic. A confluent approximately 8 cm right hepatic lobe liver  lesion is for the most part photopenic, but has peripheral nodules of  hypermetabolic activity, maximal SUV of 14.1. There is no suspicious  hypermetabolic activity within the adrenal glands and there is no  hypermetabolic lymphadenopathy within the abdomen or pelvis.     IMPRESSION:  There are multiple hypermetabolic bone lesions which likely  represent metastases. Some of the lesions are lytic while others are  nearly inconspicuous on the corresponding CT. The large confluent liver  lesions are predominantly necrotic with hypermetabolic nodules in the  periphery. One of the pulmonary nodules is hypermetabolic and the  subcentimeter nodules are likely too small for PET characterization.  For  tissue diagnosis, CT-guided biopsy of the periphery of the dominant  liver lesions or the 1 cm left upper lobe pulmonary nodule should be  considered.    MRI brain images from 2/25/2019 images personally reviewed.  2 brain lesions are present.    IMPRESSION:     There are findings consistent with intracranial metastatic disease. The  largest of these metastatic lesions is noted within the anterior portion  of the left frontal lobe and measures up to approximately 3.8 x 2.3 x  3.3 cm in greatest dimensions. The smaller metastatic lesion is noted  within the medial portion right parietal lobe measuring up to 5-6 mm in  diameter. Again, these lesions demonstrate T1 shortening which can be  seen simply with melanin or may be seen in the setting of blood  products. However, ultimately, I do suspect that these metastatic  lesions are hemorrhagic. There is some vasogenic edema which  circumscribes these lesions resulting in some localized sulcal  effacement principally at the level of the anterior portion of the left  frontal lobe. However, there is no evidence for midline or compartmental  shift.    ASSESSMENT:  This is a 63 y.o. male with:  1. Metastatic melanoma:  · Patient with recent onset of symptoms including nausea, anorexia, right-sided abdominal pain over the 2 weeks preceeding admission in January 2019  · Abnormal right upper quadrant ultrasound showing multiple liver masses 1/24/19.  · CT abdomen and pelvis 1/24/19 confirmed multiple hepatic masses worrisome for metastatic disease with the largest lesion in the right lobe measuring 8.3 cm.  There was a 1 cm sclerotic nodule in the left sacral ala which was possibly related to a bone island.  There was a subtle area of narrowing around the mid transverse colon which was felt to possibly be related to wall thickening.  · CT chest 1/25/19 showed 3 small pulmonary nodules in the left upper lobe measuring up to 9 mm which are indeterminate and a small 1 cm  nodule in the left lower lobe which may be focal atelectasis.  There were some trace bilateral pleural effusions.   · CT-guided liver biopsy 1/25/19 with pathology only showing blood.   · Colonoscopy by Dr. Rashid on 1/27 negative.  CEA 1/25/19 was normal at 1.4.   · Given the patient's history of melanoma in late 2017, there was concern regarding possible metastatic melanoma as well.  · Colonoscopy 1/27/2019 negative for malignancy.  · Discussed with Dr. Richardson with radiology.  Outpatient PET with repeat liver biopsy based on these results  · PET performed 2/1/2019  · Repeat liver biopsy on 2/8/2019 confirms metastatic melanoma with evidence also for sinusoidal dilatation consistent with cardiac etiology, increased portal fibrosis with mixed inflammation with lymphocytes and eosinophils, and acute cholangitis.  · BRAF/NRAS mutation negative  · Brain MRI on 2/25/2019 with 2 metastatic lesions.  One measures 3.8 cm in the left frontal lobe and the other measures 5-6 mm in the right parietal lobe.   · Stereotactic radiosurgery to be completed this Friday, March 22, 2019.    · He is here today, 3/20/2019,  to initiate Yervoy Opdivo.  2. Rheumatoid arthritis:  · The patient has long-standing disease treated in the past with methotrexate, Enbrel, Humira, and more recently rituximab which was started around 1-1/2 years ago with his most recent treatment administered around late December 2018.    · He has chronic diffuse arthralgias which are reasonably well-controlled on rituximab.  · As above, as metastatic melanoma is identified, this issue may complicate use of immunotherapy.  3. History of cutaneous melanoma of the right ear:  · Diagnosed initially by his dermatologist Dr. Aggarwal, appears to have been in the pre-auricular region.  Pathology reviewed from initial biopsy showing Breslow 0.82 mm, non-ulcerated lesion with low mitotic rate.  · He was referred to Dr. Vega and underwent surgery at [facility]  [place] in December 2017 with wide resection of the lesion and sentinel lymph node biopsy.  We do not yet have that pathology report however per notes available, there was a residual area of melanoma and sentinel nodes were negative.  He therefore underwent a second procedure for wide excision with presumably negative margins.     · He did not receive any adjuvant therapy.  · Right ear and neck normal on exam currently.  4. Right upper quadrant abdominal pain: He did not complain of this today.    PLAN:  1.  He will complete stereotactic radiosurgery this Friday as scheduled.  2.  We will proceed with cycle 1 day 1 Leonid Irvin as scheduled today.  3.  If his rheumatoid arthritis flairs, prednisone 10 mg daily.  4.  He will return as already scheduled on April 10, 2019 to see Dr. Hummel in anticipation of cycle #2.  5.  I have asked the patient to call the office with any new or worsening symptoms before this time.

## 2019-03-20 NOTE — PROGRESS NOTES
Chemo teaching reviewed with patient. Reviewed neutropenic precautions. Also discussed fatigue and possible diarrhea and dermatitis. Patient verbalized understanding of need to call for temp. Of 100.4 or above, or signs of infection.

## 2019-03-22 ENCOUNTER — TELEPHONE (OUTPATIENT)
Dept: ONCOLOGY | Facility: HOSPITAL | Age: 64
End: 2019-03-22

## 2019-03-22 ENCOUNTER — RADIATION ONCOLOGY WEEKLY ASSESSMENT (OUTPATIENT)
Dept: RADIATION ONCOLOGY | Facility: HOSPITAL | Age: 64
End: 2019-03-22

## 2019-03-22 ENCOUNTER — DOCUMENTATION (OUTPATIENT)
Dept: RADIATION ONCOLOGY | Facility: HOSPITAL | Age: 64
End: 2019-03-22

## 2019-03-22 DIAGNOSIS — C79.31 BRAIN METASTASES: Primary | ICD-10-CM

## 2019-03-22 PROCEDURE — 77336 RADIATION PHYSICS CONSULT: CPT | Performed by: RADIOLOGY

## 2019-03-22 PROCEDURE — 77373 STRTCTC BDY RAD THER TX DLVR: CPT | Performed by: RADIOLOGY

## 2019-03-22 NOTE — PROGRESS NOTES
"  Physician Stereotactic Management Note    Diagnosis:     1. Brain metastases (CMS/HCC)        Doing well pretreatment, no problems.    Treatment Number and Dose:fx 5/5 25gy    I was personally present at the machine for the delivery of the above treatment.     I provided direct supervision of the patient's set up, treatment parameters and image guidance. I provided corrections and approval of the above prior to the treatment, in real time. I was present for any adjustments required due to patient motion, target movement or equipment issues.    The ongoing images for localization, tumor tracking, gating and beam's eye view localization images will also be approved.     Notes on treatment course, films, medical progress and plan:    Doing well. Tolerated treatment without difficulty. No problems or questions.    Problem added:  None  Medications added:   None  Ancillary referrals made: None    I have reviewed and marked as \"reviewed\" the current medications, allergies and problem list in the patients EMR.    Patient's Care Team:  Patient Care Team:  Favio Guaman MD as PCP - Albin Pacheco MD as Consulting Physician (Hematology and Oncology)  Cleveland Rosado MD as Consulting Physician (Radiation Oncology)      "

## 2019-03-22 NOTE — TELEPHONE ENCOUNTER
Pt is currently down in radiation.  Woke up this morning with a red raised rash under his arm and on chest.  Not itchy, no fever/chills.  Reviewed with Dorota HUNG.  Called down to radiation and have asked that they have their RN or MD look at rash.  V/U.     ----- Message from Bety Aldana sent at 3/22/2019 12:56 PM EDT -----  [phone]    Had first chemo Wed now has a rash on chest and underneath arm.

## 2019-03-28 ENCOUNTER — OFFICE VISIT (OUTPATIENT)
Dept: ONCOLOGY | Facility: CLINIC | Age: 64
End: 2019-03-28

## 2019-03-28 ENCOUNTER — LAB (OUTPATIENT)
Dept: LAB | Facility: HOSPITAL | Age: 64
End: 2019-03-28

## 2019-03-28 ENCOUNTER — TELEPHONE (OUTPATIENT)
Dept: ONCOLOGY | Facility: HOSPITAL | Age: 64
End: 2019-03-28

## 2019-03-28 ENCOUNTER — TELEPHONE (OUTPATIENT)
Dept: ONCOLOGY | Facility: CLINIC | Age: 64
End: 2019-03-28

## 2019-03-28 VITALS
HEIGHT: 66 IN | WEIGHT: 193.6 LBS | DIASTOLIC BLOOD PRESSURE: 77 MMHG | SYSTOLIC BLOOD PRESSURE: 165 MMHG | BODY MASS INDEX: 31.12 KG/M2 | RESPIRATION RATE: 16 BRPM | TEMPERATURE: 98.3 F | HEART RATE: 69 BPM | OXYGEN SATURATION: 97 %

## 2019-03-28 DIAGNOSIS — L27.0 DERMATITIS, DRUG-INDUCED: ICD-10-CM

## 2019-03-28 DIAGNOSIS — C78.7 METASTATIC MELANOMA TO LIVER (HCC): Primary | ICD-10-CM

## 2019-03-28 DIAGNOSIS — B02.9 HERPES ZOSTER WITHOUT COMPLICATION: ICD-10-CM

## 2019-03-28 DIAGNOSIS — C78.7 METASTATIC MELANOMA TO LIVER (HCC): ICD-10-CM

## 2019-03-28 DIAGNOSIS — C79.31 BRAIN METASTASES: ICD-10-CM

## 2019-03-28 LAB
BASOPHILS # BLD AUTO: 0.04 10*3/MM3 (ref 0–0.2)
BASOPHILS NFR BLD AUTO: 0.6 % (ref 0–1.5)
DEPRECATED RDW RBC AUTO: 42.3 FL (ref 37–54)
EOSINOPHIL # BLD AUTO: 0.28 10*3/MM3 (ref 0–0.4)
EOSINOPHIL NFR BLD AUTO: 4.5 % (ref 0.3–6.2)
ERYTHROCYTE [DISTWIDTH] IN BLOOD BY AUTOMATED COUNT: 13.7 % (ref 12.3–15.4)
HCT VFR BLD AUTO: 39.3 % (ref 37.5–51)
HGB BLD-MCNC: 13.2 G/DL (ref 13–17.7)
IMM GRANULOCYTES # BLD AUTO: 0.04 10*3/MM3 (ref 0–0.05)
IMM GRANULOCYTES NFR BLD AUTO: 0.6 % (ref 0–0.5)
LYMPHOCYTES # BLD AUTO: 1.59 10*3/MM3 (ref 0.7–3.1)
LYMPHOCYTES NFR BLD AUTO: 25.6 % (ref 19.6–45.3)
MCH RBC QN AUTO: 28.3 PG (ref 26.6–33)
MCHC RBC AUTO-ENTMCNC: 33.6 G/DL (ref 31.5–35.7)
MCV RBC AUTO: 84.2 FL (ref 79–97)
MONOCYTES # BLD AUTO: 0.78 10*3/MM3 (ref 0.1–0.9)
MONOCYTES NFR BLD AUTO: 12.6 % (ref 5–12)
NEUTROPHILS # BLD AUTO: 3.47 10*3/MM3 (ref 1.4–7)
NEUTROPHILS NFR BLD AUTO: 56.1 % (ref 42.7–76)
NRBC BLD AUTO-RTO: 0 /100 WBC (ref 0–0)
PLATELET # BLD AUTO: 205 10*3/MM3 (ref 140–450)
PMV BLD AUTO: 10.1 FL (ref 6–12)
RBC # BLD AUTO: 4.67 10*6/MM3 (ref 4.14–5.8)
WBC NRBC COR # BLD: 6.2 10*3/MM3 (ref 3.4–10.8)

## 2019-03-28 PROCEDURE — 85025 COMPLETE CBC W/AUTO DIFF WBC: CPT | Performed by: INTERNAL MEDICINE

## 2019-03-28 PROCEDURE — 99214 OFFICE O/P EST MOD 30 MIN: CPT | Performed by: NURSE PRACTITIONER

## 2019-03-28 PROCEDURE — 36416 COLLJ CAPILLARY BLOOD SPEC: CPT | Performed by: INTERNAL MEDICINE

## 2019-03-28 RX ORDER — METHYLPREDNISOLONE 4 MG/1
TABLET ORAL
Qty: 21 EACH | Refills: 0 | Status: SHIPPED | OUTPATIENT
Start: 2019-03-28 | End: 2019-05-03 | Stop reason: SDUPTHER

## 2019-03-28 RX ORDER — VALACYCLOVIR HYDROCHLORIDE 1 G/1
1000 TABLET, FILM COATED ORAL 3 TIMES DAILY
Qty: 21 TABLET | Refills: 0 | Status: SHIPPED | OUTPATIENT
Start: 2019-03-28 | End: 2019-06-13

## 2019-03-28 NOTE — TELEPHONE ENCOUNTER
----- Message from Harriett Sorenson RN sent at 3/28/2019  9:38 AM EDT -----  Please add pt to Aurea's schedule this afternoon due to rash. Aurea aware of add on. Pt will need CBC also. Please call pt to inform of appt time. Thanks

## 2019-03-28 NOTE — TELEPHONE ENCOUNTER
----- Message from Chip Smith sent at 3/28/2019  8:32 AM EDT -----  Contact: [phone]  HAD CHEMO. NOW HAS A ITCHING RASH AND ITS SPREADING

## 2019-03-28 NOTE — TELEPHONE ENCOUNTER
Pt had first yervoy/opdivo on 3/20. Wife states he developed rash to chest and arms a few days after treatment. Rash has now spread to pt's back and is very itchy. Describes rash as small, raised areas but also states spots on back look similar to measles. Pt is taking OTC antihistamine and applying itch cream. Pt states these meds are not helping. Denies fever, chills, cough, sore throat or other symptoms. Reviewed with Aurea AYALA. Per Aurea, will bring pt in today to be seen. Wife informed and v/u. In basket message sent to appt desk to add pt on to Aurea's schedule today and call pt with time.

## 2019-03-28 NOTE — PROGRESS NOTES
[facility] GROUP OUTPATIENT FOLLOW UP CLINIC VISIT    REASON FOR FOLLOW-UP:    1.  Metastatic BRAF/NRAS mutation negative melanoma with metastatic disease present in the lung, liver bone, and brain at diagnosis.  2.  History of rheumatoid arthritis on therapy with Rituxan  3.  Right upper quadrant abdominal pain secondary to metastatic melanoma  4.  He was seen by Dr. Islas at [facility] with recommendations for ipi/nivo following stereotactic radiosurgery for his brain  5.  He was seen by Dr. Rosado with radiation oncology with plans for stereotactic radiosurgery to his 2 brain lesions with treatment planning initiated on 3/8/2019.  6. Opdivo Yervoy initiated on 3/20/2019.  7. Triage visit, 3/28/2019 regarding rash.    HISTORY OF PRESENT ILLNESS:  Gelacio Cunningham is a 63 y.o. male with the above-mentioned history, who returns the office today for triage visit regarding rash.  He initiated immunotherapy 1 week ago, 3/20/2019 with Opdivo, Yervoy.  Unfortunately, the patient developed a itchy rash a few days after immunotherapy.  This initially began on his chest and abdomen, and is now present on his back.  He is currently utilizing over-the-counter hydrocortisone cream and Benadryl which has not been helpful.  He notes he is itching through the night. The Benadryl has not been adequate in controlling his itching.   He also notes itching and a painful rash of his left upper inner arm.  This is different in appearance of the rash on his generalized body.  He denies fevers or chills, signs or symptoms of infection.  He denies sick exposure.  He reports he has tolerated immunotherapy well with the exception of the development of rash.      ONCOLOGIC HISTORY:  The patient has a past medical history significant for hypertension and rheumatoid arthritis.  For his rheumatoid arthritis he has been treated with a number of agents in the past including methotrexate, Enbrel, Humira, and more  recently rituximab which was started around 1-1/2 years ago with his most recent treatment administered around one month ago.  He has some chronic diffuse arthralgias which are reasonably well-controlled on rituximab.  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  This was diagnosed initially by his dermatologist Dr. Aggarwal.  He was referred to Dr. Vega and underwent surgery at [facility] in late 2017 with wide resection of the lesion and sentinel lymph node biopsy.  The patient reports that the margin was positive from the resection however lymph nodes were negative.  He had a second surgery with negative margins.  He did not receive any adjuvant therapy.     Recently, the patient developed symptoms around 2 weeks ago with mild nausea and loss of appetite.  He then developed around 1 week ago right-sided abdominal pain.  He presented to his primary care physician and underwent a right upper quadrant ultrasound on 1/24/19 showing multiple liver masses the largest of which measured 7.7 cm in the right hepatic lobe.  He was admitted and underwent CT of the abdomen and pelvis on 1/24/19 which confirmed multiple hepatic masses worrisome for metastatic disease with the largest lesion in the right lobe measuring 8.3 cm.  There was a 1 cm sclerotic nodule in the left sacral ala which was possibly related to a bone island.  There was a subtle area of narrowing around the mid transverse colon which was felt to possibly be related to wall thickening.  General surgery has been consulted and there are plans to pursue colonoscopy tomorrow.  He has undergone prior colonoscopy 12/23/16 with a single right-sided polyp removed.  He has now undergone CT chest 1/25/19 which showed 3 small pulmonary nodules in the left upper lobe measuring up to 9 mm which are indeterminate and a small 1 cm nodule in the left lower lobe which may be focal atelectasis.  There were some trace bilateral pleural  effusions.  The patient did undergo CT-guided liver biopsy 1/25/19 with pathology non-diagnostic.    He was discharged from the hospital.  He subsequently had a PET scan on 2/1/2019 which allowed us to target a different liver lesion that was not necrotic.  He did have a liver biopsy on 2/8/2019 with results consistent with metastatic melanoma.  The biopsy also showed evidence for acute cholangitis, increased portal fibrosis with mixed inflammation with lymphocytes and eosinophils as well as sinusoidal dilatation consistent with cardiac etiology.    A brain MRI on 2/25/2019 showed 2 brain lesions consistent with metastatic disease.  The largest is in the left frontal lobe measuring 3.8 cm with some evidence for hemorrhage and some vasogenic edema.  There is a smaller 5-6 mm lesion in the right parietal lobe.  He was seen by Dr. Rosado with plans for stereotactic radiosurgery with planning on 3/8/2019.      ALLERGIES:  Allergies   Allergen Reactions   • Sulindac Diarrhea       MEDICATIONS:  The medication list has been reviewed with the patient by the medical assistant, and the list has been updated in the electronic medical record, which I reviewed.  Medication dosages and frequencies were confirmed to be accurate.    I have reviewed the patient's medical history in detail and updated the computerized patient record.    Review of Systems   Constitutional: Negative for appetite change, chills, fatigue and fever.   HENT:   Negative for trouble swallowing.    Respiratory: Negative for cough and shortness of breath.    Cardiovascular: Negative for chest pain and leg swelling.   Gastrointestinal: Negative for abdominal distention, blood in stool, constipation, diarrhea and nausea.   Musculoskeletal: Negative for arthralgias and myalgias.   Skin: Positive for itching and rash.   Neurological: Negative for dizziness and extremity weakness.   Hematological: Does not bruise/bleed easily.   Review of systems unchanged from  "previous office visit except as updated      Vitals:    03/28/19 1431   BP: 165/77   Pulse: 69   Resp: 16   Temp: 98.3 °F (36.8 °C)   TempSrc: Oral   SpO2: 97%   Weight: 87.8 kg (193 lb 9.6 oz)   Height: 168 cm (66.14\")   PainSc: 0-No pain       PHYSICAL EXAMINATION:  GENERAL:  Well-developed well-nourished male; awake, alert and oriented, in no acute distress.  SKIN:  Warm and dry, without rashes, purpura, or petechiae.  Diffuse papular rash on the trunk and back, erythematous, confluent.  On the interior aspect of the left upper arm, the patient has a vesicular rash with lesions approximately 1 cm, raised, not confluent though linear.  The rash on the left upper arm is very different in appearance from the rash on his trunk and back.  HEAD:  Normocephalic, atraumatic.  EYES:  Pupils equal, round and reactive to light.  Extraocular movements intact.  Conjunctivae normal.  EARS:  Hearing intact.  NOSE:  Septum midline.  No excoriations or nasal discharge.  CHEST:  Lungs are clear to auscultation bilaterally.  No wheezes, rales, or rhonchi.  HEART:  Regular rate; normal rhythm.  No murmurs, gallops or rubs.  EXTREMITIES:  No clubbing, cyanosis, or edema.  NEUROLOGICAL:  No focal neurologic deficits.  Physical exam unchanged from previous office visit except as updated    DIAGNOSTIC DATA:  Results from last 7 days   Lab Units 03/28/19  1349   WBC 10*3/mm3 6.20   NEUTROS ABS 10*3/mm3 3.47   HEMOGLOBIN g/dL 13.2   HEMATOCRIT % 39.3   PLATELETS 10*3/mm3 205               ASSESSMENT:  This is a 63 y.o. male with:  1. Metastatic melanoma:  · Patient with recent onset of symptoms including nausea, anorexia, right-sided abdominal pain over the 2 weeks preceeding admission in January 2019  · Abnormal right upper quadrant ultrasound showing multiple liver masses 1/24/19.  · CT abdomen and pelvis 1/24/19 confirmed multiple hepatic masses worrisome for metastatic disease with the largest lesion in the right lobe measuring 8.3 " cm.  There was a 1 cm sclerotic nodule in the left sacral ala which was possibly related to a bone island.  There was a subtle area of narrowing around the mid transverse colon which was felt to possibly be related to wall thickening.  · CT chest 1/25/19 showed 3 small pulmonary nodules in the left upper lobe measuring up to 9 mm which are indeterminate and a small 1 cm nodule in the left lower lobe which may be focal atelectasis.  There were some trace bilateral pleural effusions.   · CT-guided liver biopsy 1/25/19 with pathology only showing blood.   · Colonoscopy by Dr. Rashid on 1/27 negative.  CEA 1/25/19 was normal at 1.4.   · Given the patient's history of melanoma in late 2017, there was concern regarding possible metastatic melanoma as well.  · Colonoscopy 1/27/2019 negative for malignancy.  · Discussed with Dr. Richardson with radiology.  Outpatient PET with repeat liver biopsy based on these results  · PET performed 2/1/2019  · Repeat liver biopsy on 2/8/2019 confirms metastatic melanoma with evidence also for sinusoidal dilatation consistent with cardiac etiology, increased portal fibrosis with mixed inflammation with lymphocytes and eosinophils, and acute cholangitis.  · BRAF/NRAS mutation negative  · Brain MRI on 2/25/2019 with 2 metastatic lesions.  One measures 3.8 cm in the left frontal lobe and the other measures 5-6 mm in the right parietal lobe.   · Stereotactic radiosurgery to be completed this Friday, March 22, 2019.    · Immunotherapy initiated 3/20/2019 with Opdivo Yervoy    2. Rheumatoid arthritis:  · The patient has long-standing disease treated in the past with methotrexate, Enbrel, Humira, and more recently rituximab which was started around 1-1/2 years ago with his most recent treatment administered around late December 2018.    · He has chronic diffuse arthralgias which are reasonably well-controlled on rituximab.  · As above, as metastatic melanoma is identified, this issue may  complicate use of immunotherapy.    3. History of cutaneous melanoma of the right ear:  · Diagnosed initially by his dermatologist Dr. Aggarwal, appears to have been in the pre-auricular region.  Pathology reviewed from initial biopsy showing Breslow 0.82 mm, non-ulcerated lesion with low mitotic rate.  · He was referred to Dr. Vega and underwent surgery at [facility] in December 2017 with wide resection of the lesion and sentinel lymph node biopsy.  We do not yet have that pathology report however per notes available, there was a residual area of melanoma and sentinel nodes were negative.  He therefore underwent a second procedure for wide excision with presumably negative margins.     · He did not receive any adjuvant therapy.      4. Rash secondary to immunotherapy.  · 1 week following the initiation of-year-old boy Opdivo, the patient has developed a pruritic diffuse rash on his trunk, chest and back.  Unresponsive to topical steroids.  · We will begin Medrol Dosepak.  If symptoms persist, he may require a higher dose of prednisone.  · Benadryl 50 mg as needed for itching.    5. Zosters of the left upper inner arm.  · Along with the development of immune mediated dermatitis, the patient is also developed zoster of his left upper inner arm.  This is very different in appearance from his immune mediated drug rash with linear vesicles.   · Begin Valtrex 1 g 3 times daily times 1 week.      PLAN:  1. Begin Medrol dose pack.    2. Begin Kenalog cream 0.1% twice a day  3. Continue Benadryl as needed for itching.    4. Valtrex 1g three times a day for 7 days.   5. Return as scheduled 4/10/2019 for follow up with Dr. Hummel and possible second cycle of Yervoy Opdivo.  6. The patient understands to call if his rash persists despite steroid dose pack and Valtrex    Aurea Magaña, APRN  03/28/2019

## 2019-03-29 LAB
CYTO UR: NORMAL
LAB AP CASE REPORT: NORMAL
LAB AP CLINICAL INFORMATION: NORMAL
LAB AP DIAGNOSIS COMMENT: NORMAL
LAB AP SPECIAL STAINS: NORMAL
Lab: NORMAL
PATH REPORT.ADDENDUM SPEC: NORMAL
PATH REPORT.FINAL DX SPEC: NORMAL
PATH REPORT.GROSS SPEC: NORMAL

## 2019-04-05 DIAGNOSIS — C78.7 METASTATIC MELANOMA TO LIVER (HCC): ICD-10-CM

## 2019-04-10 ENCOUNTER — INFUSION (OUTPATIENT)
Dept: ONCOLOGY | Facility: HOSPITAL | Age: 64
End: 2019-04-10

## 2019-04-10 ENCOUNTER — OFFICE VISIT (OUTPATIENT)
Dept: ONCOLOGY | Facility: CLINIC | Age: 64
End: 2019-04-10

## 2019-04-10 VITALS
WEIGHT: 191 LBS | SYSTOLIC BLOOD PRESSURE: 129 MMHG | RESPIRATION RATE: 16 BRPM | HEART RATE: 76 BPM | TEMPERATURE: 98.3 F | DIASTOLIC BLOOD PRESSURE: 80 MMHG | HEIGHT: 66 IN | OXYGEN SATURATION: 95 % | BODY MASS INDEX: 30.7 KG/M2

## 2019-04-10 DIAGNOSIS — C78.7 METASTATIC MELANOMA TO LIVER (HCC): Primary | ICD-10-CM

## 2019-04-10 DIAGNOSIS — C78.7 METASTATIC MELANOMA TO LIVER (HCC): ICD-10-CM

## 2019-04-10 LAB
ALBUMIN SERPL-MCNC: 3.9 G/DL (ref 3.5–5.2)
ALBUMIN/GLOB SERPL: 1.4 G/DL (ref 1.1–2.4)
ALP SERPL-CCNC: 127 U/L (ref 38–116)
ALT SERPL W P-5'-P-CCNC: 25 U/L (ref 0–41)
ANION GAP SERPL CALCULATED.3IONS-SCNC: 14.2 MMOL/L
AST SERPL-CCNC: 28 U/L (ref 0–40)
BASOPHILS # BLD AUTO: 0.03 10*3/MM3 (ref 0–0.2)
BASOPHILS NFR BLD AUTO: 0.4 % (ref 0–1.5)
BILIRUB SERPL-MCNC: 0.5 MG/DL (ref 0.2–1.2)
BUN BLD-MCNC: 16 MG/DL (ref 6–20)
BUN/CREAT SERPL: 14.4 (ref 7.3–30)
CALCIUM SPEC-SCNC: 9 MG/DL (ref 8.5–10.2)
CHLORIDE SERPL-SCNC: 99 MMOL/L (ref 98–107)
CO2 SERPL-SCNC: 21.8 MMOL/L (ref 22–29)
CORTIS SERPL-MCNC: 10.08 MCG/DL
CREAT BLD-MCNC: 1.11 MG/DL (ref 0.7–1.3)
DEPRECATED RDW RBC AUTO: 46.9 FL (ref 37–54)
EOSINOPHIL # BLD AUTO: 0.04 10*3/MM3 (ref 0–0.4)
EOSINOPHIL NFR BLD AUTO: 0.6 % (ref 0.3–6.2)
ERYTHROCYTE [DISTWIDTH] IN BLOOD BY AUTOMATED COUNT: 15 % (ref 12.3–15.4)
GFR SERPL CREATININE-BSD FRML MDRD: 67 ML/MIN/1.73
GLOBULIN UR ELPH-MCNC: 2.8 GM/DL (ref 1.8–3.5)
GLUCOSE BLD-MCNC: 160 MG/DL (ref 74–124)
HCT VFR BLD AUTO: 40.4 % (ref 37.5–51)
HGB BLD-MCNC: 13 G/DL (ref 13–17.7)
IMM GRANULOCYTES # BLD AUTO: 0.03 10*3/MM3 (ref 0–0.05)
IMM GRANULOCYTES NFR BLD AUTO: 0.4 % (ref 0–0.5)
LYMPHOCYTES # BLD AUTO: 0.93 10*3/MM3 (ref 0.7–3.1)
LYMPHOCYTES NFR BLD AUTO: 13.9 % (ref 19.6–45.3)
MCH RBC QN AUTO: 28.1 PG (ref 26.6–33)
MCHC RBC AUTO-ENTMCNC: 32.2 G/DL (ref 31.5–35.7)
MCV RBC AUTO: 87.4 FL (ref 79–97)
MONOCYTES # BLD AUTO: 0.58 10*3/MM3 (ref 0.1–0.9)
MONOCYTES NFR BLD AUTO: 8.7 % (ref 5–12)
NEUTROPHILS # BLD AUTO: 5.06 10*3/MM3 (ref 1.4–7)
NEUTROPHILS NFR BLD AUTO: 76 % (ref 42.7–76)
NRBC BLD AUTO-RTO: 0 /100 WBC (ref 0–0)
PLATELET # BLD AUTO: 189 10*3/MM3 (ref 140–450)
PMV BLD AUTO: 10.2 FL (ref 6–12)
POTASSIUM BLD-SCNC: 4 MMOL/L (ref 3.5–4.7)
PROT SERPL-MCNC: 6.7 G/DL (ref 6.3–8)
RBC # BLD AUTO: 4.62 10*6/MM3 (ref 4.14–5.8)
SODIUM BLD-SCNC: 135 MMOL/L (ref 134–145)
T4 FREE SERPL-MCNC: 1.13 NG/DL (ref 0.93–1.7)
TSH SERPL DL<=0.05 MIU/L-ACNC: 1.11 MIU/ML (ref 0.27–4.2)
WBC NRBC COR # BLD: 6.67 10*3/MM3 (ref 3.4–10.8)

## 2019-04-10 PROCEDURE — 85025 COMPLETE CBC W/AUTO DIFF WBC: CPT

## 2019-04-10 PROCEDURE — 96413 CHEMO IV INFUSION 1 HR: CPT | Performed by: INTERNAL MEDICINE

## 2019-04-10 PROCEDURE — 25010000002 IPILIMUMAB 50 MG/10ML SOLUTION 10 ML VIAL: Performed by: INTERNAL MEDICINE

## 2019-04-10 PROCEDURE — 99215 OFFICE O/P EST HI 40 MIN: CPT | Performed by: INTERNAL MEDICINE

## 2019-04-10 PROCEDURE — 96417 CHEMO IV INFUS EACH ADDL SEQ: CPT | Performed by: INTERNAL MEDICINE

## 2019-04-10 PROCEDURE — 25010000002 NIVOLUMAB 100 MG/10ML SOLUTION 10 ML VIAL: Performed by: INTERNAL MEDICINE

## 2019-04-10 PROCEDURE — 80053 COMPREHEN METABOLIC PANEL: CPT

## 2019-04-10 PROCEDURE — 82533 TOTAL CORTISOL: CPT | Performed by: INTERNAL MEDICINE

## 2019-04-10 PROCEDURE — 84443 ASSAY THYROID STIM HORMONE: CPT | Performed by: INTERNAL MEDICINE

## 2019-04-10 PROCEDURE — 84439 ASSAY OF FREE THYROXINE: CPT | Performed by: INTERNAL MEDICINE

## 2019-04-10 PROCEDURE — 25010000002 IPILIMUMAB 200 MG/40ML SOLUTION 40 ML VIAL: Performed by: INTERNAL MEDICINE

## 2019-04-10 RX ORDER — SODIUM CHLORIDE 9 MG/ML
250 INJECTION, SOLUTION INTRAVENOUS ONCE
Status: CANCELLED | OUTPATIENT
Start: 2019-04-10

## 2019-04-10 RX ORDER — SODIUM CHLORIDE 9 MG/ML
250 INJECTION, SOLUTION INTRAVENOUS ONCE
Status: COMPLETED | OUTPATIENT
Start: 2019-04-10 | End: 2019-04-10

## 2019-04-10 RX ADMIN — SODIUM CHLORIDE 250 ML: 9 INJECTION, SOLUTION INTRAVENOUS at 09:09

## 2019-04-10 RX ADMIN — SODIUM CHLORIDE 250 MG: 900 INJECTION, SOLUTION INTRAVENOUS at 09:45

## 2019-04-10 RX ADMIN — SODIUM CHLORIDE 90 MG: 9 INJECTION, SOLUTION INTRAVENOUS at 09:09

## 2019-04-10 NOTE — PROGRESS NOTES
[facility] OUTPATIENT FOLLOW UP CLINIC VISIT    REASON FOR FOLLOW-UP:    1.  Metastatic BRAF/NRAS mutation negative melanoma with metastatic disease present in the lung, liver bone, and brain at diagnosis.  2.  History of rheumatoid arthritis on therapy with Rituxan  3.  Right upper quadrant abdominal pain secondary to metastatic melanoma  4.  He was seen by Dr. Islas at [facility] with recommendations for ipi/nivo following stereotactic radiosurgery for his brain  5.  He was seen by Dr. Rosado with radiation oncology with plans for stereotactic radiosurgery to his 2 brain lesions with treatment planning initiated on 3/8/2019.  Stereotactic treatment completed from 3/13/2019 through 3/22/2019.  1 fraction at 2400 cGy administered to the right parietal lesion in 5 fractions to 3000 cGy administered to the left frontal lesion.  6. Opdivo Yervoy initiated on 3/20/2019.  7.  He was seen on 3/28/2019 with 2 rashes one consistent with a drug rash and the other consistent with herpes zoster.    HISTORY OF PRESENT ILLNESS:  Gelacio Cunningham is a 63 y.o. male with the above-mentioned history, who returns the office today anticipating cycle #2 of therapy with Opdivo and Yervoy.  He presented on 3/28/2019 for a triage visit with a rash.  One rash was consistent with a drug rash and the other consistent with herpes zoster as it was more vesicular and linear on his left arm.    He was treated with a Medrol Dosepak and Valtrex.  Thankfully, his rash improved rapidly.  He denies that there was much pruritus although notes do indicate that there was some pruritus on his back and on his left arm.  He denies any pain in the area of the left arm.  He has tolerated the medication well otherwise.  No abdominal pain at this point.  No nausea vomiting or diarrhea.  No mucositis.  He does report some fatigue and some weight gain.    He did take a Flexeril last night for some muscle pain.  In the  middle of the night, he woke up with chills.  No sweats.  They did not take his temperature.  Symptoms resolved at this point.  No urinary symptoms.  No GI symptoms.  No respiratory symptoms.      ONCOLOGIC HISTORY:  The patient has a past medical history significant for hypertension and rheumatoid arthritis.  For his rheumatoid arthritis he has been treated with a number of agents in the past including methotrexate, Enbrel, Humira, and more recently rituximab which was started around 1-1/2 years ago with his most recent treatment administered around one month ago.  He has some chronic diffuse arthralgias which are reasonably well-controlled on rituximab.  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  This was diagnosed initially by his dermatologist Dr. Aggarwal.  He was referred to Dr. Vega and underwent surgery at [facility] in late 2017 with wide resection of the lesion and sentinel lymph node biopsy.  The patient reports that the margin was positive from the resection however lymph nodes were negative.  He had a second surgery with negative margins.  He did not receive any adjuvant therapy.     Recently, the patient developed symptoms around 2 weeks ago with mild nausea and loss of appetite.  He then developed around 1 week ago right-sided abdominal pain.  He presented to his primary care physician and underwent a right upper quadrant ultrasound on 1/24/19 showing multiple liver masses the largest of which measured 7.7 cm in the right hepatic lobe.  He was admitted and underwent CT of the abdomen and pelvis on 1/24/19 which confirmed multiple hepatic masses worrisome for metastatic disease with the largest lesion in the right lobe measuring 8.3 cm.  There was a 1 cm sclerotic nodule in the left sacral ala which was possibly related to a bone island.  There was a subtle area of narrowing around the mid transverse colon which was felt to possibly be related to wall  thickening.  General surgery has been consulted and there are plans to pursue colonoscopy tomorrow.  He has undergone prior colonoscopy 12/23/16 with a single right-sided polyp removed.  He has now undergone CT chest 1/25/19 which showed 3 small pulmonary nodules in the left upper lobe measuring up to 9 mm which are indeterminate and a small 1 cm nodule in the left lower lobe which may be focal atelectasis.  There were some trace bilateral pleural effusions.  The patient did undergo CT-guided liver biopsy 1/25/19 with pathology non-diagnostic.    He was discharged from the hospital.  He subsequently had a PET scan on 2/1/2019 which allowed us to target a different liver lesion that was not necrotic.  He did have a liver biopsy on 2/8/2019 with results consistent with metastatic melanoma.  The biopsy also showed evidence for acute cholangitis, increased portal fibrosis with mixed inflammation with lymphocytes and eosinophils as well as sinusoidal dilatation consistent with cardiac etiology.    A brain MRI on 2/25/2019 showed 2 brain lesions consistent with metastatic disease.  The largest is in the left frontal lobe measuring 3.8 cm with some evidence for hemorrhage and some vasogenic edema.  There is a smaller 5-6 mm lesion in the right parietal lobe.  He was seen by Dr. Rosado with plans for stereotactic radiosurgery with planning on 3/8/2019.      ALLERGIES:  Allergies   Allergen Reactions   • Sulindac Diarrhea       MEDICATIONS:  The medication list has been reviewed with the patient by the medical assistant, and the list has been updated in the electronic medical record, which I reviewed.  Medication dosages and frequencies were confirmed to be accurate.    I have reviewed the patient's medical history in detail and updated the computerized patient record.    Review of Systems   Constitutional: Positive for fatigue. Negative for appetite change, chills and fever.   HENT:   Negative for trouble swallowing.   "  Respiratory: Negative for cough and shortness of breath.    Cardiovascular: Negative for chest pain and leg swelling.   Gastrointestinal: Negative for abdominal distention, blood in stool, constipation, diarrhea and nausea.   Musculoskeletal: Negative for arthralgias and myalgias.   Skin: Negative for itching and rash.        Rash has resolved   Neurological: Negative for dizziness and extremity weakness.   Hematological: Does not bruise/bleed easily.   Review of systems unchanged from previous office visit except as updated      Vitals:    04/10/19 0801   BP: 129/80   Pulse: 76   Resp: 16   Temp: 98.3 °F (36.8 °C)   TempSrc: Oral   SpO2: 95%   Weight: 86.6 kg (191 lb)   Height: 168 cm (66.14\")   PainSc: 0-No pain  Comment: liver cancer       PHYSICAL EXAMINATION:  GENERAL:  Well-developed well-nourished male; awake, alert and oriented, in no acute distress.  SKIN: There is no rash present today.  HEAD:  Normocephalic, atraumatic.  EYES:  Pupils equal, round and reactive to light.  Extraocular movements intact.  Conjunctivae normal.  EARS:  Hearing intact.  NOSE:  Septum midline.  No excoriations or nasal discharge.  CHEST:  Lungs are clear to auscultation bilaterally.  No wheezes, rales, or rhonchi.  HEART:  Regular rate; normal rhythm.  No murmurs, gallops or rubs.  EXTREMITIES:  No clubbing, cyanosis, or edema.  NEUROLOGICAL:  No focal neurologic deficits.  Physical exam unchanged from previous office visit except as updated    DIAGNOSTIC DATA:  Results from last 7 days   Lab Units 04/10/19  0739   WBC 10*3/mm3 6.67   NEUTROS ABS 10*3/mm3 5.06   HEMOGLOBIN g/dL 13.0   HEMATOCRIT % 40.4   PLATELETS 10*3/mm3 189     Results from last 7 days   Lab Units 04/10/19  0739   SODIUM mmol/L 135   POTASSIUM mmol/L 4.0   CHLORIDE mmol/L 99   CO2 mmol/L 21.8*   BUN mg/dL 16   CREATININE mg/dL 1.11   CALCIUM mg/dL 9.0   ALBUMIN g/dL 3.90   BILIRUBIN mg/dL 0.5   ALK PHOS U/L 127*   ALT (SGPT) U/L 25   AST (SGOT) U/L 28 "   GLUCOSE mg/dL 160*           ASSESSMENT:  This is a 63 y.o. male with:  1. Metastatic melanoma:  · Patient with recent onset of symptoms including nausea, anorexia, right-sided abdominal pain over the 2 weeks preceeding admission in January 2019  · Abnormal right upper quadrant ultrasound showing multiple liver masses 1/24/19.  · CT abdomen and pelvis 1/24/19 confirmed multiple hepatic masses worrisome for metastatic disease with the largest lesion in the right lobe measuring 8.3 cm.  There was a 1 cm sclerotic nodule in the left sacral ala which was possibly related to a bone island.  There was a subtle area of narrowing around the mid transverse colon which was felt to possibly be related to wall thickening.  · CT chest 1/25/19 showed 3 small pulmonary nodules in the left upper lobe measuring up to 9 mm which are indeterminate and a small 1 cm nodule in the left lower lobe which may be focal atelectasis.  There were some trace bilateral pleural effusions.   · CT-guided liver biopsy 1/25/19 with pathology only showing blood.   · Colonoscopy by Dr. Rashid on 1/27 negative.  CEA 1/25/19 was normal at 1.4.   · Given the patient's history of melanoma in late 2017, there was concern regarding possible metastatic melanoma as well.  · Colonoscopy 1/27/2019 negative for malignancy.  · Discussed with Dr. Richardson with radiology.  Outpatient PET with repeat liver biopsy based on these results  · PET performed 2/1/2019  · Repeat liver biopsy on 2/8/2019 confirms metastatic melanoma with evidence also for sinusoidal dilatation consistent with cardiac etiology, increased portal fibrosis with mixed inflammation with lymphocytes and eosinophils, and acute cholangitis.  · BRAF/NRAS mutation negative  · Brain MRI on 2/25/2019 with 2 metastatic lesions.  One measures 3.8 cm in the left frontal lobe and the other measures 5-6 mm in the right parietal lobe.   · Stereotactic radiosurgery to be completed this Friday, March 22, 2019.     · Immunotherapy initiated 3/20/2019 with Opdivo Yervoy  · Rash following cycle #1.  2 rashes present one on his trunk and one on the left arm which appeared different and more vesicular consistent with zoster.  He was treated with a Medrol Dosepak and Valtrex with prompt resolution of his symptoms.  · Cycle 2 initiated on 4/10/2019.  Plan 4 cycles followed by imaging followed by maintenance Opdivo assuming a good response.    2. Rheumatoid arthritis:  · The patient has long-standing disease treated in the past with methotrexate, Enbrel, Humira, and more recently rituximab which was started around 1-1/2 years ago with his most recent treatment administered around late December 2018.    · He has chronic diffuse arthralgias which are reasonably well-controlled on rituximab.  · As above, as metastatic melanoma is identified, this issue may complicate use of immunotherapy.  · Currently asymptomatic from the RA.  Monitor closely.    3. History of cutaneous melanoma of the right ear:  · Diagnosed initially by his dermatologist Dr. Aggarwal, appears to have been in the pre-auricular region.  Pathology reviewed from initial biopsy showing Breslow 0.82 mm, non-ulcerated lesion with low mitotic rate.  · He was referred to Dr. Vega and underwent surgery at [facility] in December 2017 with wide resection of the lesion and sentinel lymph node biopsy.  We do not yet have that pathology report however per notes available, there was a residual area of melanoma and sentinel nodes were negative.  He therefore underwent a second procedure for wide excision with presumably negative margins.     · He did not receive any adjuvant therapy.    4. Rash secondary to immunotherapy.  · 1 week following the initiation of Yervoy and Opdivo, the patient has developed a pruritic diffuse rash on his trunk, chest and back.  Unresponsive to OTC topical steroids.  · Treated with a medrol dose joel with prompt resolution  · He will let  us know if this occurs again.    5. Suspected varicella-zoster rash of the left upper inner arm.  · Along with the development of immune mediated dermatitis, the patient also developed suspected zoster of his left upper inner arm.  This appeared very different in appearance from his immune mediated drug rash with linear vesicles.   · He was treated with Valtrex 1 g 3 times daily times 1 week with prompt resolution.  Unclear if this was really different than the other rash or not with prompt resolution and no pain involved.    6.  Chills overnight: Unclear significance.  No other infectious symptoms.  They did not measure his temperature so we do not know if he was febrile.  He is afebrile and asymptomatic here in the office.  Unclear if this was related to medication.  I advised him to take his temperature of this occurs again.  This was a new issue discussed today.    PLAN:  1. Proceed with cycle #2 of Yervoy and Opdivo at the same doses.  2. They will notify us if he again develops a rash or any other concerning symptoms and we can see him at any point  3. Ultimately we plan for 4 cycles of this therapy followed by imaging followed hopefully by maintenance Opdivo  4. He will follow-up with radiation oncology after his MRI of the brain  5. I will see him in 3 weeks for cycle #3    Albin Hummel MD

## 2019-04-11 ENCOUNTER — TELEPHONE (OUTPATIENT)
Dept: ONCOLOGY | Facility: HOSPITAL | Age: 64
End: 2019-04-11

## 2019-04-11 ENCOUNTER — INFUSION (OUTPATIENT)
Dept: LAB | Facility: HOSPITAL | Age: 64
End: 2019-04-11

## 2019-04-11 ENCOUNTER — APPOINTMENT (OUTPATIENT)
Dept: LAB | Facility: HOSPITAL | Age: 64
End: 2019-04-11

## 2019-04-11 ENCOUNTER — TELEPHONE (OUTPATIENT)
Dept: ONCOLOGY | Facility: CLINIC | Age: 64
End: 2019-04-11

## 2019-04-11 DIAGNOSIS — R50.9 FEVER CHILLS: Primary | ICD-10-CM

## 2019-04-11 DIAGNOSIS — R50.9 FEVER CHILLS: ICD-10-CM

## 2019-04-11 LAB
BACTERIA UR QL AUTO: NEGATIVE /HPF
BILIRUB UR QL STRIP: ABNORMAL
CLARITY UR: CLEAR
COLOR UR: ABNORMAL
GLUCOSE UR STRIP-MCNC: NEGATIVE MG/DL
HGB UR QL STRIP.AUTO: NEGATIVE
HYALINE CASTS UR QL AUTO: ABNORMAL /LPF
KETONES UR QL STRIP: ABNORMAL
LEUKOCYTE ESTERASE UR QL STRIP.AUTO: NEGATIVE
MUCOUS THREADS URNS QL MICRO: ABNORMAL /HPF
NITRITE UR QL STRIP: NEGATIVE
PH UR STRIP.AUTO: <=5 [PH] (ref 4.5–8)
PROT UR QL STRIP: NEGATIVE
RBC # UR: ABNORMAL /HPF
REF LAB TEST METHOD: ABNORMAL
SP GR UR STRIP: 1.02 (ref 1–1.03)
SQUAMOUS #/AREA URNS HPF: ABNORMAL /HPF
UROBILINOGEN UR QL STRIP: ABNORMAL
WBC UR QL AUTO: ABNORMAL /HPF

## 2019-04-11 PROCEDURE — 81001 URINALYSIS AUTO W/SCOPE: CPT

## 2019-04-11 PROCEDURE — 87040 BLOOD CULTURE FOR BACTERIA: CPT | Performed by: INTERNAL MEDICINE

## 2019-04-11 NOTE — TELEPHONE ENCOUNTER
----- Message from Maryam Guerra sent at 4/10/2019  5:11 PM EDT -----  Contact: [phone]  Pt had chemo today and started running fever on 101.3. Pt is taking tylenol.

## 2019-04-11 NOTE — TELEPHONE ENCOUNTER
Called and spoke to wife.  Pt had episode of chills and fever last evening with temp of 101.7 and a temp this morning of 101.  Denies any symptoms other than fatigue.  Treated yesterday.  Discussed with Dr. Hummel.  Pt to come in for blood cultures x 2, and a urinalysis.  Dr. Hummel will follow results.  Message sent to scheduling.  Spouse informed that scheduling will call her to make apt.  Spouse v/u.

## 2019-04-11 NOTE — TELEPHONE ENCOUNTER
----- Message from Abida Miller RN sent at 4/11/2019 10:03 AM EDT -----  Regarding: Needs apt today for lab only please  Needs apt today for blood cultures and urinalysis.  No review, just the labs.  Thanks

## 2019-04-15 ENCOUNTER — TELEPHONE (OUTPATIENT)
Dept: ONCOLOGY | Facility: HOSPITAL | Age: 64
End: 2019-04-15

## 2019-04-15 NOTE — TELEPHONE ENCOUNTER
----- Message from Maryam Guerra sent at 4/15/2019  4:31 PM EDT -----  Contact: [phone]  Return call

## 2019-04-15 NOTE — TELEPHONE ENCOUNTER
----- Message from Maryam Guerra sent at 4/15/2019  4:00 PM EDT -----  Contact: [phone]  Angelique Potter results .

## 2019-04-15 NOTE — TELEPHONE ENCOUNTER
Pt's wife calling regarding Blood cultures and UA results.  Informed her blood cultures were negative and UA showed no bacteria.  Wife verbalized understanding.  Instructed wife to call with any questions or concerns.  Pt's wife v/u.

## 2019-04-16 LAB
BACTERIA SPEC AEROBE CULT: NORMAL
BACTERIA SPEC AEROBE CULT: NORMAL

## 2019-04-28 DIAGNOSIS — C79.31 BRAIN METASTASES: Primary | ICD-10-CM

## 2019-04-29 ENCOUNTER — OFFICE VISIT (OUTPATIENT)
Dept: RADIATION ONCOLOGY | Facility: HOSPITAL | Age: 64
End: 2019-04-29

## 2019-04-29 VITALS — HEART RATE: 66 BPM | DIASTOLIC BLOOD PRESSURE: 80 MMHG | OXYGEN SATURATION: 96 % | SYSTOLIC BLOOD PRESSURE: 134 MMHG

## 2019-04-29 DIAGNOSIS — C79.31 BRAIN METASTASES: Primary | ICD-10-CM

## 2019-04-29 PROCEDURE — 99024 POSTOP FOLLOW-UP VISIT: CPT | Performed by: RADIOLOGY

## 2019-04-29 NOTE — PROGRESS NOTES
Subjective          Diagnosis Plan   1. Brain metastases (CMS/HCC)            HPI     Mr. Cunningham is seen back in follow-up today 5 weeks after completing radiation therapy to 2 brain metastases related to metastatic melanoma.  Both lesions were treated stereotactically.  The larger mass in the anterior left frontal lobe  which measured 3.8 x 2.3 x 3.3 cm was treated to 30 Gy in 5 fractions, the smaller 6 mm nodule on the medial right parietal lobe was treated to 24 Gy in a single fraction.  He tolerated his course of therapy well.  He presents presents today for routine first 5-week follow-up and has no treatment related issues -  clinically looks well, affect and thought content are appropriate, no signs of confusion, speech is intact, station gait is normal.  Denies new headache or neurological issues save for what he describes as a subjective feeling of word finding difficulty, intermittent.                         Objective     Family History   Problem Relation Age of Onset   • COPD Father    • Cancer Father    • Asthma Brother    • Diabetes Paternal Grandfather          Past Medical History:   Diagnosis Date   • Arthritis     Rheumatoid arthritis multiple sites   • Cancer (CMS/HCC) 2017    melanoma, right ear   • DDD (degenerative disc disease), lumbar    • H/O Liver masses    • Hypertension    • Liver disease     liver lesions   • Metastatic melanoma to liver (CMS/HCC)    • Neck mass, left 2018   • Rheumatoid arthritis (CMS/HCC)     has had 14 years, was on Methotrexate, Embrel, Humira and now Rituxin         Past Surgical History:   Procedure Laterality Date   • COLONOSCOPY N/A 12/23/2016    Procedure: COLONOSCOPY TO CECUM WITH POLYPECTOMY (COLD BIOPSY);  Surgeon: Antolin Munoz Jr., MD;  Location: [facility] ENDOSCOPY;  Service:    • COLONOSCOPY N/A 1/27/2019    Procedure: COLONOSCOPY TO CECUM;  Surgeon: Eduardo Rashid MD;  Location: [facility] ENDOSCOPY;  Service: General         Review of Systems - Oncology    As above    Physical Exam As above    Assessment/Plan  Metastatic melanoma.  No treatment related issues at 5 weeks post SRS and SRT to 2 brain lesions.  We have him scheduled for follow-up MRI brain in the brain the third week in May.  I will see him after that study is complete.      Cleveland Rosado MD      Cc:  No ref. provider found

## 2019-05-01 ENCOUNTER — INFUSION (OUTPATIENT)
Dept: ONCOLOGY | Facility: HOSPITAL | Age: 64
End: 2019-05-01

## 2019-05-01 ENCOUNTER — OFFICE VISIT (OUTPATIENT)
Dept: ONCOLOGY | Facility: CLINIC | Age: 64
End: 2019-05-01

## 2019-05-01 VITALS
DIASTOLIC BLOOD PRESSURE: 74 MMHG | HEIGHT: 66 IN | HEART RATE: 73 BPM | RESPIRATION RATE: 16 BRPM | OXYGEN SATURATION: 98 % | SYSTOLIC BLOOD PRESSURE: 111 MMHG | TEMPERATURE: 97.5 F | BODY MASS INDEX: 29.83 KG/M2 | WEIGHT: 185.6 LBS

## 2019-05-01 DIAGNOSIS — C78.7 METASTATIC MELANOMA TO LIVER (HCC): ICD-10-CM

## 2019-05-01 LAB
ALBUMIN SERPL-MCNC: 3.5 G/DL (ref 3.5–5.2)
ALBUMIN/GLOB SERPL: 1.2 G/DL (ref 1.1–2.4)
ALP SERPL-CCNC: 116 U/L (ref 38–116)
ALT SERPL W P-5'-P-CCNC: 142 U/L (ref 0–41)
ANION GAP SERPL CALCULATED.3IONS-SCNC: 9.5 MMOL/L
AST SERPL-CCNC: 132 U/L (ref 0–40)
BASOPHILS # BLD AUTO: 0.03 10*3/MM3 (ref 0–0.2)
BASOPHILS NFR BLD AUTO: 0.8 % (ref 0–1.5)
BILIRUB SERPL-MCNC: 0.7 MG/DL (ref 0.2–1.2)
BUN BLD-MCNC: 13 MG/DL (ref 6–20)
BUN/CREAT SERPL: 13.5 (ref 7.3–30)
CALCIUM SPEC-SCNC: 8.8 MG/DL (ref 8.5–10.2)
CHLORIDE SERPL-SCNC: 100 MMOL/L (ref 98–107)
CO2 SERPL-SCNC: 27.5 MMOL/L (ref 22–29)
CORTIS SERPL-MCNC: 15.79 MCG/DL
CREAT BLD-MCNC: 0.96 MG/DL (ref 0.7–1.3)
DEPRECATED RDW RBC AUTO: 45.3 FL (ref 37–54)
EOSINOPHIL # BLD AUTO: 0.01 10*3/MM3 (ref 0–0.4)
EOSINOPHIL NFR BLD AUTO: 0.3 % (ref 0.3–6.2)
ERYTHROCYTE [DISTWIDTH] IN BLOOD BY AUTOMATED COUNT: 14.7 % (ref 12.3–15.4)
GFR SERPL CREATININE-BSD FRML MDRD: 79 ML/MIN/1.73
GLOBULIN UR ELPH-MCNC: 2.9 GM/DL (ref 1.8–3.5)
GLUCOSE BLD-MCNC: 128 MG/DL (ref 74–124)
HCT VFR BLD AUTO: 37 % (ref 37.5–51)
HGB BLD-MCNC: 12 G/DL (ref 13–17.7)
IMM GRANULOCYTES # BLD AUTO: 0.02 10*3/MM3 (ref 0–0.05)
IMM GRANULOCYTES NFR BLD AUTO: 0.5 % (ref 0–0.5)
LYMPHOCYTES # BLD AUTO: 0.72 10*3/MM3 (ref 0.7–3.1)
LYMPHOCYTES NFR BLD AUTO: 19.6 % (ref 19.6–45.3)
MCH RBC QN AUTO: 27.5 PG (ref 26.6–33)
MCHC RBC AUTO-ENTMCNC: 32.4 G/DL (ref 31.5–35.7)
MCV RBC AUTO: 84.9 FL (ref 79–97)
MONOCYTES # BLD AUTO: 0.42 10*3/MM3 (ref 0.1–0.9)
MONOCYTES NFR BLD AUTO: 11.4 % (ref 5–12)
NEUTROPHILS # BLD AUTO: 2.48 10*3/MM3 (ref 1.7–7)
NEUTROPHILS NFR BLD AUTO: 67.4 % (ref 42.7–76)
NRBC BLD AUTO-RTO: 0 /100 WBC (ref 0–0.2)
PLATELET # BLD AUTO: 178 10*3/MM3 (ref 140–450)
PMV BLD AUTO: 9.8 FL (ref 6–12)
POTASSIUM BLD-SCNC: 3.3 MMOL/L (ref 3.5–4.7)
PROT SERPL-MCNC: 6.4 G/DL (ref 6.3–8)
RBC # BLD AUTO: 4.36 10*6/MM3 (ref 4.14–5.8)
SODIUM BLD-SCNC: 137 MMOL/L (ref 134–145)
T4 FREE SERPL-MCNC: 1.4 NG/DL (ref 0.93–1.7)
TSH SERPL DL<=0.05 MIU/L-ACNC: 2.75 MIU/ML (ref 0.27–4.2)
WBC NRBC COR # BLD: 3.68 10*3/MM3 (ref 3.4–10.8)

## 2019-05-01 PROCEDURE — 80053 COMPREHEN METABOLIC PANEL: CPT

## 2019-05-01 PROCEDURE — 84439 ASSAY OF FREE THYROXINE: CPT | Performed by: INTERNAL MEDICINE

## 2019-05-01 PROCEDURE — 85025 COMPLETE CBC W/AUTO DIFF WBC: CPT

## 2019-05-01 PROCEDURE — 82533 TOTAL CORTISOL: CPT | Performed by: INTERNAL MEDICINE

## 2019-05-01 PROCEDURE — 99215 OFFICE O/P EST HI 40 MIN: CPT | Performed by: INTERNAL MEDICINE

## 2019-05-01 PROCEDURE — 84443 ASSAY THYROID STIM HORMONE: CPT | Performed by: INTERNAL MEDICINE

## 2019-05-01 RX ORDER — SODIUM CHLORIDE 9 MG/ML
250 INJECTION, SOLUTION INTRAVENOUS ONCE
Status: CANCELLED | OUTPATIENT
Start: 2019-05-29

## 2019-05-01 RX ORDER — SODIUM CHLORIDE 9 MG/ML
250 INJECTION, SOLUTION INTRAVENOUS ONCE
Status: CANCELLED | OUTPATIENT
Start: 2019-06-27

## 2019-05-01 NOTE — PROGRESS NOTES
Patient was to receive Opdivo and Yervoy today, but treatment held due to elevated liver enzymes. Dr. Hummel spoke with patient and notified him of such. Potassium 3.3 Encouraged to eat foods high in potassium.  Appointment scheduled for 5/3/2019 for repeat labs RN review. Message sent to scheduling. Patient and wife verbalize understanding.

## 2019-05-01 NOTE — PROGRESS NOTES
[facility] OUTPATIENT FOLLOW UP CLINIC VISIT    REASON FOR FOLLOW-UP:    1.  Metastatic BRAF/NRAS mutation negative melanoma with metastatic disease present in the lung, liver bone, and brain at diagnosis.  2.  History of rheumatoid arthritis on therapy with Rituxan  3.  Right upper quadrant abdominal pain secondary to metastatic melanoma  4.  He was seen by Dr. Islas at [facility] with recommendations for ipi/nivo following stereotactic radiosurgery for his brain  5.  He was seen by Dr. Rosado with radiation oncology with plans for stereotactic radiosurgery to his 2 brain lesions with treatment planning initiated on 3/8/2019.  Stereotactic treatment completed from 3/13/2019 through 3/22/2019.  1 fraction at 2400 cGy administered to the right parietal lesion in 5 fractions to 3000 cGy administered to the left frontal lesion.  6. Opdivo Yervoy initiated on 3/20/2019.  7.  He was seen on 3/28/2019 with 2 rashes one consistent with a drug rash and the other consistent with herpes zoster.    HISTORY OF PRESENT ILLNESS:  Gelacio Cunningham is a 64 y.o. male with the above-mentioned history, who returns the office today anticipating cycle #3 of therapy with Opdivo and Yervoy.      After cycle #1 of therapy he developed a rash.  One rash was consistent with a drug rash and the other consistent with herpes zoster as it was more vesicular and linear on his left arm.  He was treated with Medrol Dosepak and Valtrex.    He proceeded with cycle #2 of therapy 3 weeks ago.  He developed fevers after that.  Cultures were negative.  Fevers have dissipated.  However, he continues to have some night sweats.  They were severe at first but now are dissipating and he is sleeping more at this point.  He has a minimal rash at this point that is not pruritic.  He denies much pain.  He continues to have some fatigue but overall remains active.  He does have some nausea and has lost some weight.  He  states that his nausea improves with root beer and Tums.  He is trying to minimize pharmaceutical use.    ONCOLOGIC HISTORY:  The patient has a past medical history significant for hypertension and rheumatoid arthritis.  For his rheumatoid arthritis he has been treated with a number of agents in the past including methotrexate, Enbrel, Humira, and more recently rituximab which was started around 1-1/2 years ago with his most recent treatment administered around one month ago.  He has some chronic diffuse arthralgias which are reasonably well-controlled on rituximab.  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  This was diagnosed initially by his dermatologist Dr. Aggarwal.  He was referred to Dr. Vega and underwent surgery at [facility] in late 2017 with wide resection of the lesion and sentinel lymph node biopsy.  The patient reports that the margin was positive from the resection however lymph nodes were negative.  He had a second surgery with negative margins.  He did not receive any adjuvant therapy.     Recently, the patient developed symptoms around 2 weeks ago with mild nausea and loss of appetite.  He then developed around 1 week ago right-sided abdominal pain.  He presented to his primary care physician and underwent a right upper quadrant ultrasound on 1/24/19 showing multiple liver masses the largest of which measured 7.7 cm in the right hepatic lobe.  He was admitted and underwent CT of the abdomen and pelvis on 1/24/19 which confirmed multiple hepatic masses worrisome for metastatic disease with the largest lesion in the right lobe measuring 8.3 cm.  There was a 1 cm sclerotic nodule in the left sacral ala which was possibly related to a bone island.  There was a subtle area of narrowing around the mid transverse colon which was felt to possibly be related to wall thickening.  General surgery has been consulted and there are plans to pursue colonoscopy tomorrow.   He has undergone prior colonoscopy 12/23/16 with a single right-sided polyp removed.  He has now undergone CT chest 1/25/19 which showed 3 small pulmonary nodules in the left upper lobe measuring up to 9 mm which are indeterminate and a small 1 cm nodule in the left lower lobe which may be focal atelectasis.  There were some trace bilateral pleural effusions.  The patient did undergo CT-guided liver biopsy 1/25/19 with pathology non-diagnostic.    He was discharged from the hospital.  He subsequently had a PET scan on 2/1/2019 which allowed us to target a different liver lesion that was not necrotic.  He did have a liver biopsy on 2/8/2019 with results consistent with metastatic melanoma.  The biopsy also showed evidence for acute cholangitis, increased portal fibrosis with mixed inflammation with lymphocytes and eosinophils as well as sinusoidal dilatation consistent with cardiac etiology.    A brain MRI on 2/25/2019 showed 2 brain lesions consistent with metastatic disease.  The largest is in the left frontal lobe measuring 3.8 cm with some evidence for hemorrhage and some vasogenic edema.  There is a smaller 5-6 mm lesion in the right parietal lobe.  He was seen by Dr. Rosado with plans for stereotactic radiosurgery with planning on 3/8/2019.      ALLERGIES:  Allergies   Allergen Reactions   • Sulindac Diarrhea       MEDICATIONS:  The medication list has been reviewed with the patient by the medical assistant, and the list has been updated in the electronic medical record, which I reviewed.  Medication dosages and frequencies were confirmed to be accurate.    I have reviewed the patient's medical history in detail and updated the computerized patient record.    Review of Systems   Constitutional: Positive for diaphoresis, fatigue and fever (Resolved). Negative for appetite change and chills.   HENT:   Negative for trouble swallowing.    Respiratory: Negative for cough and shortness of breath.    Cardiovascular:  "Negative for chest pain and leg swelling.   Gastrointestinal: Positive for nausea. Negative for abdominal distention, blood in stool, constipation and diarrhea.   Musculoskeletal: Negative for arthralgias and myalgias.   Skin: Positive for rash (Mild at this point). Negative for itching.   Neurological: Negative for dizziness and extremity weakness.   Hematological: Does not bruise/bleed easily.   Review of systems unchanged from previous office visit except as updated      Vitals:    05/01/19 0917   BP: 111/74   Pulse: 73   Resp: 16   Temp: 97.5 °F (36.4 °C)   TempSrc: Oral   SpO2: 98%   Weight: 84.2 kg (185 lb 9.6 oz)   Height: 168 cm (66.14\")   PainSc: 0-No pain  Comment: liver cancer       PHYSICAL EXAMINATION:  GENERAL:  Well-developed well-nourished male; awake, alert and oriented, in no acute distress.  SKIN: There is no rash present today.  HEAD:  Normocephalic, atraumatic.  EYES:  Pupils equal, round and reactive to light.  Extraocular movements intact.  Conjunctivae normal.  EARS:  Hearing intact.  NOSE:  Septum midline.  No excoriations or nasal discharge.  CHEST:  Lungs are clear to auscultation bilaterally.  No wheezes, rales, or rhonchi.  HEART:  Regular rate; normal rhythm.  No murmurs, gallops or rubs.  Abdomen: Soft, nontender, nondistended, normal active bowel sounds.  EXTREMITIES:  No clubbing, cyanosis, or edema.  NEUROLOGICAL:  No focal neurologic deficits.  Physical exam unchanged from previous office visit except as updated    DIAGNOSTIC DATA:  Results from last 7 days   Lab Units 05/01/19  0901   WBC 10*3/mm3 3.68   NEUTROS ABS 10*3/mm3 2.48   HEMOGLOBIN g/dL 12.0*   HEMATOCRIT % 37.0*   PLATELETS 10*3/mm3 178               ASSESSMENT:  This is a 64 y.o. male with:  1. Metastatic melanoma:  · Patient with recent onset of symptoms including nausea, anorexia, right-sided abdominal pain over the 2 weeks preceeding admission in January 2019  · Abnormal right upper quadrant ultrasound showing " multiple liver masses 1/24/19.  · CT abdomen and pelvis 1/24/19 confirmed multiple hepatic masses worrisome for metastatic disease with the largest lesion in the right lobe measuring 8.3 cm.  There was a 1 cm sclerotic nodule in the left sacral ala which was possibly related to a bone island.  There was a subtle area of narrowing around the mid transverse colon which was felt to possibly be related to wall thickening.  · CT chest 1/25/19 showed 3 small pulmonary nodules in the left upper lobe measuring up to 9 mm which are indeterminate and a small 1 cm nodule in the left lower lobe which may be focal atelectasis.  There were some trace bilateral pleural effusions.   · CT-guided liver biopsy 1/25/19 with pathology only showing blood.   · Colonoscopy by Dr. Rashid on 1/27 negative.  CEA 1/25/19 was normal at 1.4.   · Given the patient's history of melanoma in late 2017, there was concern regarding possible metastatic melanoma as well.  · Colonoscopy 1/27/2019 negative for malignancy.  · Discussed with Dr. Richardson with radiology.  Outpatient PET with repeat liver biopsy based on these results  · PET performed 2/1/2019  · Repeat liver biopsy on 2/8/2019 confirms metastatic melanoma with evidence also for sinusoidal dilatation consistent with cardiac etiology, increased portal fibrosis with mixed inflammation with lymphocytes and eosinophils, and acute cholangitis.  · BRAF/NRAS mutation negative  · Brain MRI on 2/25/2019 with 2 metastatic lesions.  One measures 3.8 cm in the left frontal lobe and the other measures 5-6 mm in the right parietal lobe.   · Stereotactic radiosurgery to be completed this Friday, March 22, 2019.    · Immunotherapy initiated 3/20/2019 with Brandee Jaquez  · Rash following cycle #1.  2 rashes present one on his trunk and one on the left arm which appeared different and more vesicular consistent with zoster.  He was treated with a Medrol Dosepak and Valtrex with prompt resolution of his  symptoms.  · Cycle 2 initiated on 4/10/2019.  Plan 4 cycles followed by imaging followed by maintenance Opdivo assuming a good response.  · Fever and night sweats following cycle #2 of therapy.  Cultures negative.  These have dissipated prior to cycle #3.  · Cycle #3 administered on 5/1/2019.  · Plan cycle #4 of Opdivo and Yervoy in 3 weeks followed by CT imaging of the chest abdomen and pelvis in 5 weeks    2. Rheumatoid arthritis:  · The patient has long-standing disease treated in the past with methotrexate, Enbrel, Humira, and more recently rituximab which was started around 1-1/2 years ago with his most recent treatment administered around late December 2018.    · He has chronic diffuse arthralgias which are reasonably well-controlled on rituximab.  · As above, as metastatic melanoma is identified, this issue may complicate use of immunotherapy.  · Currently asymptomatic from the RA.  Monitor closely.    3. History of cutaneous melanoma of the right ear:  · Diagnosed initially by his dermatologist Dr. Aggarwal, appears to have been in the pre-auricular region.  Pathology reviewed from initial biopsy showing Breslow 0.82 mm, non-ulcerated lesion with low mitotic rate.  · He was referred to Dr. Vega and underwent surgery at [facility] in December 2017 with wide resection of the lesion and sentinel lymph node biopsy.  We do not yet have that pathology report however per notes available, there was a residual area of melanoma and sentinel nodes were negative.  He therefore underwent a second procedure for wide excision with presumably negative margins.     · He did not receive any adjuvant therapy.    4. Rash secondary to immunotherapy.  · 1 week following the initiation of Yervoy and Opdivo, the patient has developed a pruritic diffuse rash on his trunk, chest and back.  Unresponsive to OTC topical steroids.  · Treated with a medrol dose joel with prompt resolution  · He will let us know if this  occurs again.  Minimal at this point    5. Suspected varicella-zoster rash of the left upper inner arm.  · Along with the development of immune mediated dermatitis, the patient also developed suspected zoster of his left upper inner arm.  This appeared very different in appearance from his immune mediated drug rash with linear vesicles.   · He was treated with Valtrex 1 g 3 times daily times 1 week with prompt resolution.  Unclear if this was really different than the other rash or not with prompt resolution and no pain involved.    6.  Pyrexia and diaphoresis: Likely secondary to immunotherapy.  Cultures were negative.  He is tolerating this well.  Hopefully this will not worsen with cycle #3.    7.  Brain metastases: He has followed up with Dr. Rosado with a repeat MRI of the brain planned in a few weeks.    8.  Transaminase elevation: His AST is 132 today with an ALT of 142.  He denies taking much acetaminophen.  I do not think he has viral hepatitis.  Hold treatment today.  Repeat stat CMP on Friday this week and if liver labs are not improving, Medrol Dosepak at that time.    PLAN:  1.  Hold treatment today  2.  Stat CMP with nurse review on Friday and if liver labs are not improving, Medrol Dosepak.  3.  At this point we will go ahead and arrange a nurse practitioner visit in 1 week with labs and [place], with hopes that his liver labs will be improved by that point.  This could certainly change.  If liver labs are not improving with the Medrol Dosepak we may need prolonged steroids.  At this point I am hopeful that we can resume his therapy.    Albin Hummel MD

## 2019-05-03 ENCOUNTER — LAB (OUTPATIENT)
Dept: LAB | Facility: HOSPITAL | Age: 64
End: 2019-05-03

## 2019-05-03 ENCOUNTER — INFUSION (OUTPATIENT)
Dept: ONCOLOGY | Facility: HOSPITAL | Age: 64
End: 2019-05-03

## 2019-05-03 DIAGNOSIS — C78.7 METASTATIC MELANOMA TO LIVER (HCC): ICD-10-CM

## 2019-05-03 LAB
ALBUMIN SERPL-MCNC: 3.2 G/DL (ref 3.5–5.2)
ALBUMIN/GLOB SERPL: 1.1 G/DL (ref 1.1–2.4)
ALP SERPL-CCNC: 110 U/L (ref 38–116)
ALT SERPL W P-5'-P-CCNC: 132 U/L (ref 0–41)
ANION GAP SERPL CALCULATED.3IONS-SCNC: 9.2 MMOL/L
AST SERPL-CCNC: 116 U/L (ref 0–40)
BILIRUB SERPL-MCNC: 0.8 MG/DL (ref 0.2–1.2)
BUN BLD-MCNC: 14 MG/DL (ref 6–20)
BUN/CREAT SERPL: 13.3 (ref 7.3–30)
CALCIUM SPEC-SCNC: 8.1 MG/DL (ref 8.5–10.2)
CHLORIDE SERPL-SCNC: 99 MMOL/L (ref 98–107)
CO2 SERPL-SCNC: 26.8 MMOL/L (ref 22–29)
CREAT BLD-MCNC: 1.05 MG/DL (ref 0.7–1.3)
GFR SERPL CREATININE-BSD FRML MDRD: 72 ML/MIN/1.73
GLOBULIN UR ELPH-MCNC: 2.8 GM/DL (ref 1.8–3.5)
GLUCOSE BLD-MCNC: 126 MG/DL (ref 74–124)
POTASSIUM BLD-SCNC: 3.7 MMOL/L (ref 3.5–4.7)
PROT SERPL-MCNC: 6 G/DL (ref 6.3–8)
SODIUM BLD-SCNC: 135 MMOL/L (ref 134–145)

## 2019-05-03 PROCEDURE — 80053 COMPREHEN METABOLIC PANEL: CPT | Performed by: INTERNAL MEDICINE

## 2019-05-03 PROCEDURE — 36415 COLL VENOUS BLD VENIPUNCTURE: CPT | Performed by: INTERNAL MEDICINE

## 2019-05-03 RX ORDER — METHYLPREDNISOLONE 4 MG/1
TABLET ORAL
Qty: 21 EACH | Refills: 0 | Status: SHIPPED | OUTPATIENT
Start: 2019-05-03 | End: 2019-06-13

## 2019-05-03 NOTE — PROGRESS NOTES
Pt here for RN review repeat CMP.  Noted elevated AST/ALT, called and spoke with Dr Hummel, pt to be started on Medrol Dose [place].  Medication e-scribed to pharmacy.  He reports his Rheumatoid pain is increasing and is hopeful that the steroid pack will help with the pain, he is using ASA.  I encouraged him to use this in small amounts as it increases his risk for bleeding, he v/u

## 2019-05-06 DIAGNOSIS — C78.7 METASTATIC MELANOMA TO LIVER (HCC): ICD-10-CM

## 2019-05-08 ENCOUNTER — APPOINTMENT (OUTPATIENT)
Dept: ONCOLOGY | Facility: HOSPITAL | Age: 64
End: 2019-05-08

## 2019-05-08 ENCOUNTER — OFFICE VISIT (OUTPATIENT)
Dept: ONCOLOGY | Facility: CLINIC | Age: 64
End: 2019-05-08

## 2019-05-08 ENCOUNTER — INFUSION (OUTPATIENT)
Dept: ONCOLOGY | Facility: HOSPITAL | Age: 64
End: 2019-05-08

## 2019-05-08 VITALS
RESPIRATION RATE: 16 BRPM | HEART RATE: 76 BPM | BODY MASS INDEX: 29.81 KG/M2 | TEMPERATURE: 98.1 F | HEIGHT: 66 IN | DIASTOLIC BLOOD PRESSURE: 79 MMHG | OXYGEN SATURATION: 96 % | SYSTOLIC BLOOD PRESSURE: 137 MMHG | WEIGHT: 185.5 LBS

## 2019-05-08 DIAGNOSIS — R74.8 ELEVATED LIVER ENZYMES: ICD-10-CM

## 2019-05-08 DIAGNOSIS — C78.7 METASTATIC MELANOMA TO LIVER (HCC): ICD-10-CM

## 2019-05-08 DIAGNOSIS — R74.8 ELEVATED LIVER ENZYMES: Primary | ICD-10-CM

## 2019-05-08 LAB
ALBUMIN SERPL-MCNC: 3.6 G/DL (ref 3.5–5.2)
ALBUMIN/GLOB SERPL: 1.4 G/DL (ref 1.1–2.4)
ALP SERPL-CCNC: 110 U/L (ref 38–116)
ALT SERPL W P-5'-P-CCNC: 139 U/L (ref 0–41)
ANION GAP SERPL CALCULATED.3IONS-SCNC: 11 MMOL/L
AST SERPL-CCNC: 80 U/L (ref 0–40)
BASOPHILS # BLD AUTO: 0.02 10*3/MM3 (ref 0–0.2)
BASOPHILS NFR BLD AUTO: 0.3 % (ref 0–1.5)
BILIRUB SERPL-MCNC: 0.7 MG/DL (ref 0.2–1.2)
BUN BLD-MCNC: 13 MG/DL (ref 6–20)
BUN/CREAT SERPL: 14.4 (ref 7.3–30)
CALCIUM SPEC-SCNC: 8.8 MG/DL (ref 8.5–10.2)
CHLORIDE SERPL-SCNC: 100 MMOL/L (ref 98–107)
CO2 SERPL-SCNC: 25 MMOL/L (ref 22–29)
CORTIS SERPL-MCNC: 4.12 MCG/DL
CREAT BLD-MCNC: 0.9 MG/DL (ref 0.7–1.3)
DEPRECATED RDW RBC AUTO: 47.2 FL (ref 37–54)
EOSINOPHIL # BLD AUTO: 0 10*3/MM3 (ref 0–0.4)
EOSINOPHIL NFR BLD AUTO: 0 % (ref 0.3–6.2)
ERYTHROCYTE [DISTWIDTH] IN BLOOD BY AUTOMATED COUNT: 15.3 % (ref 12.3–15.4)
GFR SERPL CREATININE-BSD FRML MDRD: 86 ML/MIN/1.73
GLOBULIN UR ELPH-MCNC: 2.6 GM/DL (ref 1.8–3.5)
GLUCOSE BLD-MCNC: 90 MG/DL (ref 74–124)
HAV IGM SERPL QL IA: NORMAL
HBV CORE IGM SERPL QL IA: NORMAL
HBV SURFACE AG SERPL QL IA: NORMAL
HCT VFR BLD AUTO: 39.9 % (ref 37.5–51)
HCV AB SER DONR QL: NORMAL
HGB BLD-MCNC: 12.9 G/DL (ref 13–17.7)
IMM GRANULOCYTES # BLD AUTO: 0.16 10*3/MM3 (ref 0–0.05)
IMM GRANULOCYTES NFR BLD AUTO: 2.4 % (ref 0–0.5)
LYMPHOCYTES # BLD AUTO: 1.42 10*3/MM3 (ref 0.7–3.1)
LYMPHOCYTES NFR BLD AUTO: 20.9 % (ref 19.6–45.3)
MCH RBC QN AUTO: 27.7 PG (ref 26.6–33)
MCHC RBC AUTO-ENTMCNC: 32.3 G/DL (ref 31.5–35.7)
MCV RBC AUTO: 85.6 FL (ref 79–97)
MONOCYTES # BLD AUTO: 0.6 10*3/MM3 (ref 0.1–0.9)
MONOCYTES NFR BLD AUTO: 8.8 % (ref 5–12)
NEUTROPHILS # BLD AUTO: 4.6 10*3/MM3 (ref 1.7–7)
NEUTROPHILS NFR BLD AUTO: 67.6 % (ref 42.7–76)
NRBC BLD AUTO-RTO: 0 /100 WBC (ref 0–0.2)
PLATELET # BLD AUTO: 302 10*3/MM3 (ref 140–450)
PMV BLD AUTO: 9.4 FL (ref 6–12)
POTASSIUM BLD-SCNC: 4.3 MMOL/L (ref 3.5–4.7)
PROT SERPL-MCNC: 6.2 G/DL (ref 6.3–8)
RBC # BLD AUTO: 4.66 10*6/MM3 (ref 4.14–5.8)
SODIUM BLD-SCNC: 136 MMOL/L (ref 134–145)
T4 FREE SERPL-MCNC: 1.52 NG/DL (ref 0.93–1.7)
TSH SERPL DL<=0.05 MIU/L-ACNC: 1.16 MIU/ML (ref 0.27–4.2)
WBC NRBC COR # BLD: 6.8 10*3/MM3 (ref 3.4–10.8)

## 2019-05-08 PROCEDURE — 84439 ASSAY OF FREE THYROXINE: CPT | Performed by: INTERNAL MEDICINE

## 2019-05-08 PROCEDURE — 99214 OFFICE O/P EST MOD 30 MIN: CPT | Performed by: NURSE PRACTITIONER

## 2019-05-08 PROCEDURE — 80074 ACUTE HEPATITIS PANEL: CPT | Performed by: NURSE PRACTITIONER

## 2019-05-08 PROCEDURE — 36415 COLL VENOUS BLD VENIPUNCTURE: CPT | Performed by: NURSE PRACTITIONER

## 2019-05-08 PROCEDURE — 84443 ASSAY THYROID STIM HORMONE: CPT | Performed by: INTERNAL MEDICINE

## 2019-05-08 PROCEDURE — 80053 COMPREHEN METABOLIC PANEL: CPT

## 2019-05-08 PROCEDURE — 82533 TOTAL CORTISOL: CPT | Performed by: INTERNAL MEDICINE

## 2019-05-08 PROCEDURE — 85025 COMPLETE CBC W/AUTO DIFF WBC: CPT

## 2019-05-08 RX ORDER — PREDNISONE 20 MG/1
60 TABLET ORAL DAILY
Qty: 21 TABLET | Refills: 0 | Status: SHIPPED | OUTPATIENT
Start: 2019-05-08 | End: 2019-06-13

## 2019-05-08 RX ORDER — PREDNISONE 20 MG/1
60 TABLET ORAL DAILY
Qty: 21 TABLET | Refills: 0 | Status: SHIPPED | OUTPATIENT
Start: 2019-05-08 | End: 2019-05-08 | Stop reason: SDUPTHER

## 2019-05-08 NOTE — PROGRESS NOTES
Subjective     HISTORY OF PRESENT ILLNESS:     History of Present Illness    Metastatic BRAF/NRAS mutation negative melanoma with metastatic disease present in the lung liver bone brain and diagnosis.  He is here today for scheduled lab review and possibly resume his Yervoy Opdivo.  Treatment was held last week as a result of elevated liver enzymes.  Dr. Hummel did start the patient on a Medrol Dosepak slightly improved the patient's AST at 80 versus 160, however, his ALT remains elevated at 139 versus 132.  Mr. Cunningham has avoided Tylenol, alcohol or new medications.      He denies new symptoms, however, he does report progressive pain related to his rheumatoid arthritis.  His immunotherapy was discontinued before starting treatment for fear of blunting the effects of Opdivo/Yervoy.  He noticed an improvement of his diffuse pain with the first 2 days of his Medrol Dosepak but this did subside and he reports persistent pain particularly at night.  He has not taken Tylenol or ibuprofen.    His appetite is fair.  His weight is stable.  His CBC is stable.      Past Medical History, Past Surgical History, Social History, Family History have been reviewed and are without significant changes except as mentioned.    Review of Systems   Constitutional: Positive for fatigue.   HENT: Negative.    Eyes: Negative.    Respiratory: Negative.    Cardiovascular: Negative.    Gastrointestinal: Negative.    Endocrine: Negative.    Musculoskeletal: Positive for arthralgias, back pain and myalgias.   Skin: Negative.    Allergic/Immunologic: Negative.    Neurological: Negative.    Hematological: Negative.    Psychiatric/Behavioral: Negative.    All other systems reviewed and are negative.     A comprehensive 14 point review of systems was performed and was negative except as mentioned.    Medications:  The current medication list was reviewed in the EMR    ALLERGIES:    Allergies   Allergen Reactions   • Sulindac Diarrhea       Objective  "     Vitals:    05/08/19 1321   BP: 137/79   Pulse: 76   Resp: 16   Temp: 98.1 °F (36.7 °C)   TempSrc: Oral   SpO2: 96%   Weight: 84.1 kg (185 lb 8 oz)   Height: 168 cm (66.14\")   PainSc: 0-No pain     Current Status 5/8/2019   ECOG score 1       Physical Exam   Constitutional: He is oriented to person, place, and time. He appears well-developed and well-nourished.   Eyes: Conjunctivae and EOM are normal. Pupils are equal, round, and reactive to light.   Neck: Normal range of motion. Neck supple.   Cardiovascular: Normal rate and regular rhythm.   Pulmonary/Chest: Effort normal and breath sounds normal.   Abdominal: Soft. Bowel sounds are normal. He exhibits no mass. There is no tenderness. There is no guarding.   Musculoskeletal: Normal range of motion.   Neurological: He is alert and oriented to person, place, and time.   Skin: Skin is warm and dry. No rash noted.   Psychiatric: He has a normal mood and affect. His behavior is normal.   Nursing note and vitals reviewed.        RECENT LABS:  Hematology WBC   Date Value Ref Range Status   05/08/2019 6.80 3.40 - 10.80 10*3/mm3 Final   01/23/2019 9.80 4.50 - 10.70 10*3/mm3 Final     RBC   Date Value Ref Range Status   05/08/2019 4.66 4.14 - 5.80 10*6/mm3 Final   01/23/2019 4.42 (L) 4.60 - 6.00 10*6/mm3 Final     Hemoglobin   Date Value Ref Range Status   05/08/2019 12.9 (L) 13.0 - 17.7 g/dL Final     Hematocrit   Date Value Ref Range Status   05/08/2019 39.9 37.5 - 51.0 % Final     Platelets   Date Value Ref Range Status   05/08/2019 302 140 - 450 10*3/mm3 Final              Assessment/Plan   1. 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  He is here today, May 8, 2019, for lab review and to resume Yervoy Opdivo cycle 3.  He was held last week due to elevated liver enzymes.  2.  Elevated liver enzymes related to therapy.  Dr. Hummel did prescribe the patient a Medrol Dosepak which he has " completed.  His enzymes remain elevated in the office today.  3.  Diffuse myalgias related to his rheumatoid arthritis.  This has become progressively worse over the past several weeks.  He is no longer on immunosuppressive therapy as this was discontinued before starting Yervoy Opdivo.  Does report reprieve from pain the first 2 days of his Medrol Dosepak but his symptoms quickly returned.    PLAN:  1.  I have reviewed the patient's lab work with Dr. Hummel today.  We will continue to hold Yervoy Opdivo.  The patient will start 60 mg of prednisone daily.  He will return in 1 week for lab review, nurse practitioner visit, and to potentially resume Yervoy/Opdivo at that time.  2.  I have ordered a viral hepatitis panel to be added to the patient's lab work today.We will continue to monitor his labs closely.  3. We discussed the effect that Prednisone will have on his rheumatoid pain and hopefully improve his symptoms.  4.  I have asked the patient to call the office with any new or worsening symptoms before her scheduled appointment next week.                  5/8/2019      CC:

## 2019-05-13 DIAGNOSIS — C78.7 METASTATIC MELANOMA TO LIVER (HCC): Primary | ICD-10-CM

## 2019-05-13 DIAGNOSIS — Z79.899 HIGH RISK MEDICATION USE: ICD-10-CM

## 2019-05-14 NOTE — PROGRESS NOTES
Subjective     HISTORY OF PRESENT ILLNESS:     History of Present Illness    Metastatic BRAF/NRAS mutation negative melanoma with metastatic disease present in the lung liver bone brain and diagnosis.  He is here today for lab review and consideration of Yervoy and Opdivo.  Treatment has now been held for 2 weeks secondary transaminase elevation.  He was placed on a Medrol Dosepak with slight improvement of  ALT and AST last week.  Decision was made to hold treatment last week and initiate prednisone, 60 mg daily.  Of note, an acute hepatitis panel was obtained and was thankfully negative.    Today, patient is feeling quite well.  He has noted a significant reduction in his discomfort related to rheumatoid arthritis with the steroid therapy.  He is maintaining adequate nutrition and hydration.  He denies any infectious symptoms including fevers or chills.  No excess bleeding or bruising noted.  No skin changes reported.    His liver enzymes have continued to improve.  AST today is 42 and ALT is 98.  All other labs are within normal limits.     He has no other concerns today.        Past Medical History, Past Surgical History, Social History, Family History have been reviewed and are without significant changes except as mentioned.    Review of Systems   Constitutional: Positive for fatigue (improved ).   HENT: Negative.    Eyes: Negative.    Respiratory: Negative.    Cardiovascular: Negative.    Gastrointestinal: Negative.    Endocrine: Negative.    Musculoskeletal: Positive for arthralgias, back pain and myalgias.        See HPI    Skin: Negative.    Allergic/Immunologic: Negative.    Neurological: Negative.    Hematological: Negative.    Psychiatric/Behavioral: Negative.    All other systems reviewed and are negative.     A comprehensive 14 point review of systems was performed and was negative except as mentioned.    Medications:  The current medication list was reviewed in the EMR    ALLERGIES:    Allergies    Allergen Reactions   • Sulindac Diarrhea       Objective      There were no vitals filed for this visit.  Current Status 5/8/2019   ECOG score 1       Physical Exam   Constitutional: He is oriented to person, place, and time. He appears well-developed and well-nourished.   Eyes: Conjunctivae and EOM are normal. Pupils are equal, round, and reactive to light.   Neck: Normal range of motion. Neck supple.   Cardiovascular: Normal rate and regular rhythm.   Pulmonary/Chest: Effort normal and breath sounds normal.   Abdominal: Soft. Bowel sounds are normal. He exhibits no mass. There is no tenderness. There is no guarding.   Musculoskeletal: Normal range of motion.   Neurological: He is alert and oriented to person, place, and time.   Skin: Skin is warm and dry. No rash noted.   Psychiatric: He has a normal mood and affect. His behavior is normal.   Nursing note and vitals reviewed.        RECENT LABS:  Hematology WBC   Date Value Ref Range Status   05/08/2019 6.80 3.40 - 10.80 10*3/mm3 Final   01/23/2019 9.80 4.50 - 10.70 10*3/mm3 Final     RBC   Date Value Ref Range Status   05/08/2019 4.66 4.14 - 5.80 10*6/mm3 Final   01/23/2019 4.42 (L) 4.60 - 6.00 10*6/mm3 Final     Hemoglobin   Date Value Ref Range Status   05/08/2019 12.9 (L) 13.0 - 17.7 g/dL Final     Hematocrit   Date Value Ref Range Status   05/08/2019 39.9 37.5 - 51.0 % Final     Platelets   Date Value Ref Range Status   05/08/2019 302 140 - 450 10*3/mm3 Final              Assessment/Plan   1. 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  Patient returns today, May 15, 2019 for consideration of cycle #3 of your voiding Opdivo, which has been held over the last 2 weeks secondary to elevated transaminases.  His liver enzymes have demonstrated further improvement, however they have not returned to normal limits.  For this reason, we will continue to hold his therapy for the next 2  weeks and will attempt to further taper his prednisone dosing.  The patient was initially scheduled to undergo repeat CT imaging on June 4, 2019.  After review with Dr. Hummel, we will delay this as the patient has only had 2 cycles of therapy, at this point.  He will keep his appointment for brain MRI for radiation purposes.    2.  Elevated liver enzymes related to therapy.  Treatment has been held over the last 2 weeks.  Patient initially was prescribed a Medrol Dosepak which did provide some reduction in his liver enzymes, though they did not return to normal.  Subsequently, he was placed on 60 mg of prednisone daily.  As above, liver functions have continued to decrease but are not at normal levels.  Per NCCN guidelines, we will attempt to reinitiate therapy once LFTs have reached a normal level and the patient is only taking 10 mg of prednisone.  After review with Dr. Hummel, we will have the patient taper his dosing of prednisone from 60 mg down to 50 mg for the next 3 days.  He will subsequently reduce his prednisone dosing by 10 mg every 3 days.  Ultimately, we will have the patient return in 2 weeks at which time he should be on 10 mg of prednisone daily.  We will attempt to proceed with cycle #3 of treatment of therapy at that time.     3.  Diffuse myalgias related to his rheumatoid arthritis.  This has become progressively worse over the past several weeks.  He is no longer on immunosuppressive therapy as this was discontinued before starting Yervoy Opdivo.  He has noted a significant improvement in arthralgias with the prednisone. We will slowly taper Prednisone, as above.     PLAN:  1.  Patient's lab work today has been reviewed with Dr. Hummel and we will continue to delay the Opdivo and Yervoy for the next 2 weeks and we will proceed with slow taper of prednisone, as outlined above.  2.  He will undergo brain MRI on May 22, 2019.  We have canceled patient's CT scan that was initially scheduled for June 4,  2019.  3.  The patient will return in 2 weeks for MD visit with Dr. Hummel and consideration of cycle #3 of treatment with normalization of liver enzymes  4.  In the interim, I have asked the patient to call our office with any issues or concerns.  He has verbalized understanding.    -All the above reviewed with Dr. Hummel and he is in agreement                  5/14/2019      CC:

## 2019-05-15 ENCOUNTER — OFFICE VISIT (OUTPATIENT)
Dept: ONCOLOGY | Facility: CLINIC | Age: 64
End: 2019-05-15

## 2019-05-15 ENCOUNTER — INFUSION (OUTPATIENT)
Dept: ONCOLOGY | Facility: HOSPITAL | Age: 64
End: 2019-05-15

## 2019-05-15 ENCOUNTER — APPOINTMENT (OUTPATIENT)
Dept: ONCOLOGY | Facility: HOSPITAL | Age: 64
End: 2019-05-15

## 2019-05-15 VITALS
DIASTOLIC BLOOD PRESSURE: 85 MMHG | RESPIRATION RATE: 16 BRPM | BODY MASS INDEX: 30.17 KG/M2 | SYSTOLIC BLOOD PRESSURE: 162 MMHG | HEIGHT: 66 IN | HEART RATE: 69 BPM | OXYGEN SATURATION: 96 % | TEMPERATURE: 98 F | WEIGHT: 187.7 LBS

## 2019-05-15 DIAGNOSIS — C78.7 METASTATIC MELANOMA TO LIVER (HCC): Primary | ICD-10-CM

## 2019-05-15 DIAGNOSIS — C79.31 BRAIN METASTASES: ICD-10-CM

## 2019-05-15 DIAGNOSIS — R74.8 ELEVATED LIVER ENZYMES: ICD-10-CM

## 2019-05-15 DIAGNOSIS — M05.89 OTHER RHEUMATOID ARTHRITIS WITH RHEUMATOID FACTOR OF MULTIPLE SITES (HCC): ICD-10-CM

## 2019-05-15 DIAGNOSIS — Z79.899 HIGH RISK MEDICATION USE: ICD-10-CM

## 2019-05-15 DIAGNOSIS — C78.7 METASTATIC MELANOMA TO LIVER (HCC): ICD-10-CM

## 2019-05-15 LAB
ALBUMIN SERPL-MCNC: 3.4 G/DL (ref 3.5–5.2)
ALBUMIN/GLOB SERPL: 1.5 G/DL (ref 1.1–2.4)
ALP SERPL-CCNC: 118 U/L (ref 38–116)
ALT SERPL W P-5'-P-CCNC: 98 U/L (ref 0–41)
ANION GAP SERPL CALCULATED.3IONS-SCNC: 10.2 MMOL/L
AST SERPL-CCNC: 42 U/L (ref 0–40)
BASOPHILS # BLD AUTO: 0.01 10*3/MM3 (ref 0–0.2)
BASOPHILS NFR BLD AUTO: 0.1 % (ref 0–1.5)
BILIRUB SERPL-MCNC: 0.5 MG/DL (ref 0.2–1.2)
BUN BLD-MCNC: 14 MG/DL (ref 6–20)
BUN/CREAT SERPL: 15.4 (ref 7.3–30)
CALCIUM SPEC-SCNC: 9.2 MG/DL (ref 8.5–10.2)
CHLORIDE SERPL-SCNC: 101 MMOL/L (ref 98–107)
CO2 SERPL-SCNC: 26.8 MMOL/L (ref 22–29)
CORTIS SERPL-MCNC: 1.24 MCG/DL
CREAT BLD-MCNC: 0.91 MG/DL (ref 0.7–1.3)
DEPRECATED RDW RBC AUTO: 49 FL (ref 37–54)
EOSINOPHIL # BLD AUTO: 0 10*3/MM3 (ref 0–0.4)
EOSINOPHIL NFR BLD AUTO: 0 % (ref 0.3–6.2)
ERYTHROCYTE [DISTWIDTH] IN BLOOD BY AUTOMATED COUNT: 15.5 % (ref 12.3–15.4)
GFR SERPL CREATININE-BSD FRML MDRD: 84 ML/MIN/1.73
GLOBULIN UR ELPH-MCNC: 2.3 GM/DL (ref 1.8–3.5)
GLUCOSE BLD-MCNC: 129 MG/DL (ref 74–124)
HCT VFR BLD AUTO: 38.5 % (ref 37.5–51)
HGB BLD-MCNC: 12.6 G/DL (ref 13–17.7)
IMM GRANULOCYTES # BLD AUTO: 0.12 10*3/MM3 (ref 0–0.05)
IMM GRANULOCYTES NFR BLD AUTO: 1.4 % (ref 0–0.5)
LYMPHOCYTES # BLD AUTO: 1.96 10*3/MM3 (ref 0.7–3.1)
LYMPHOCYTES NFR BLD AUTO: 22.1 % (ref 19.6–45.3)
MCH RBC QN AUTO: 28.3 PG (ref 26.6–33)
MCHC RBC AUTO-ENTMCNC: 32.7 G/DL (ref 31.5–35.7)
MCV RBC AUTO: 86.5 FL (ref 79–97)
MONOCYTES # BLD AUTO: 0.71 10*3/MM3 (ref 0.1–0.9)
MONOCYTES NFR BLD AUTO: 8 % (ref 5–12)
NEUTROPHILS # BLD AUTO: 6.05 10*3/MM3 (ref 1.7–7)
NEUTROPHILS NFR BLD AUTO: 68.4 % (ref 42.7–76)
NRBC BLD AUTO-RTO: 0 /100 WBC (ref 0–0.2)
PLATELET # BLD AUTO: 268 10*3/MM3 (ref 140–450)
PMV BLD AUTO: 9.6 FL (ref 6–12)
POTASSIUM BLD-SCNC: 3.5 MMOL/L (ref 3.5–4.7)
PROT SERPL-MCNC: 5.7 G/DL (ref 6.3–8)
RBC # BLD AUTO: 4.45 10*6/MM3 (ref 4.14–5.8)
SODIUM BLD-SCNC: 138 MMOL/L (ref 134–145)
T4 FREE SERPL-MCNC: 1.46 NG/DL (ref 0.93–1.7)
TSH SERPL DL<=0.05 MIU/L-ACNC: 1.81 MIU/ML (ref 0.27–4.2)
WBC NRBC COR # BLD: 8.85 10*3/MM3 (ref 3.4–10.8)

## 2019-05-15 PROCEDURE — 80053 COMPREHEN METABOLIC PANEL: CPT

## 2019-05-15 PROCEDURE — 99215 OFFICE O/P EST HI 40 MIN: CPT | Performed by: NURSE PRACTITIONER

## 2019-05-15 PROCEDURE — 84443 ASSAY THYROID STIM HORMONE: CPT | Performed by: INTERNAL MEDICINE

## 2019-05-15 PROCEDURE — 84439 ASSAY OF FREE THYROXINE: CPT | Performed by: INTERNAL MEDICINE

## 2019-05-15 PROCEDURE — 82533 TOTAL CORTISOL: CPT | Performed by: INTERNAL MEDICINE

## 2019-05-15 PROCEDURE — 85025 COMPLETE CBC W/AUTO DIFF WBC: CPT

## 2019-05-15 RX ORDER — PREDNISONE 20 MG/1
20 TABLET ORAL DAILY
Qty: 40 TABLET | Refills: 0 | Status: SHIPPED | OUTPATIENT
Start: 2019-05-15 | End: 2019-06-13

## 2019-05-15 RX ORDER — PREDNISONE 10 MG/1
TABLET ORAL
Qty: 20 TABLET | Refills: 0 | Status: SHIPPED | OUTPATIENT
Start: 2019-05-15 | End: 2019-06-13

## 2019-05-15 NOTE — PROGRESS NOTES
Reviewed CMP with ABHILASH Francisco, who consulted with Dr Hummel. Tx held today and pt will return in 2 weeks for repeat labs and possible tx. IV d/cd and pt sent to scheduling for his new schedule.

## 2019-05-20 RX ORDER — NEBIVOLOL HYDROCHLORIDE 5 MG/1
TABLET ORAL
Qty: 90 TABLET | Refills: 1 | Status: SHIPPED | OUTPATIENT
Start: 2019-05-20 | End: 2019-11-08 | Stop reason: SDUPTHER

## 2019-05-22 ENCOUNTER — HOSPITAL ENCOUNTER (OUTPATIENT)
Dept: MRI IMAGING | Facility: HOSPITAL | Age: 64
Discharge: HOME | End: 2019-05-22
Admitting: RADIOLOGY

## 2019-05-22 DIAGNOSIS — C79.31 BRAIN METASTASES: ICD-10-CM

## 2019-05-22 LAB — CREAT BLDA-MCNC: 0.8 MG/DL (ref 0.6–1.3)

## 2019-05-22 PROCEDURE — A9577 INJ MULTIHANCE: HCPCS | Performed by: RADIOLOGY

## 2019-05-22 PROCEDURE — 0 GADOBENATE DIMEGLUMINE 529 MG/ML SOLUTION: Performed by: RADIOLOGY

## 2019-05-22 PROCEDURE — 82565 ASSAY OF CREATININE: CPT

## 2019-05-22 PROCEDURE — 70553 MRI BRAIN STEM W/O & W/DYE: CPT

## 2019-05-22 RX ADMIN — GADOBENATE DIMEGLUMINE 17 ML: 529 INJECTION, SOLUTION INTRAVENOUS at 11:38

## 2019-05-28 DIAGNOSIS — C78.7 METASTATIC MELANOMA TO LIVER (HCC): Primary | ICD-10-CM

## 2019-05-29 ENCOUNTER — INFUSION (OUTPATIENT)
Dept: ONCOLOGY | Facility: HOSPITAL | Age: 64
End: 2019-05-29

## 2019-05-29 ENCOUNTER — OFFICE VISIT (OUTPATIENT)
Dept: RADIATION ONCOLOGY | Facility: HOSPITAL | Age: 64
End: 2019-05-29

## 2019-05-29 ENCOUNTER — OFFICE VISIT (OUTPATIENT)
Dept: ONCOLOGY | Facility: CLINIC | Age: 64
End: 2019-05-29

## 2019-05-29 VITALS
OXYGEN SATURATION: 99 % | TEMPERATURE: 96.8 F | SYSTOLIC BLOOD PRESSURE: 137 MMHG | HEIGHT: 66 IN | DIASTOLIC BLOOD PRESSURE: 74 MMHG | WEIGHT: 190 LBS | BODY MASS INDEX: 30.53 KG/M2 | HEART RATE: 59 BPM

## 2019-05-29 VITALS
RESPIRATION RATE: 16 BRPM | DIASTOLIC BLOOD PRESSURE: 79 MMHG | OXYGEN SATURATION: 94 % | HEART RATE: 67 BPM | BODY MASS INDEX: 30.76 KG/M2 | TEMPERATURE: 98.3 F | HEIGHT: 66 IN | WEIGHT: 191.4 LBS | SYSTOLIC BLOOD PRESSURE: 154 MMHG

## 2019-05-29 DIAGNOSIS — C78.7 METASTATIC MELANOMA TO LIVER (HCC): Primary | ICD-10-CM

## 2019-05-29 DIAGNOSIS — C78.7 METASTATIC MELANOMA TO LIVER (HCC): ICD-10-CM

## 2019-05-29 DIAGNOSIS — C79.31 BRAIN METASTASES: Primary | ICD-10-CM

## 2019-05-29 LAB
ALBUMIN SERPL-MCNC: 3.9 G/DL (ref 3.5–5.2)
ALBUMIN/GLOB SERPL: 1.7 G/DL (ref 1.1–2.4)
ALP SERPL-CCNC: 99 U/L (ref 38–116)
ALT SERPL W P-5'-P-CCNC: 58 U/L (ref 0–41)
ANION GAP SERPL CALCULATED.3IONS-SCNC: 12.6 MMOL/L
AST SERPL-CCNC: 33 U/L (ref 0–40)
BASOPHILS # BLD AUTO: 0.03 10*3/MM3 (ref 0–0.2)
BASOPHILS NFR BLD AUTO: 0.4 % (ref 0–1.5)
BILIRUB SERPL-MCNC: 0.5 MG/DL (ref 0.2–1.2)
BUN BLD-MCNC: 13 MG/DL (ref 6–20)
BUN/CREAT SERPL: 13.7 (ref 7.3–30)
CALCIUM SPEC-SCNC: 9.1 MG/DL (ref 8.5–10.2)
CHLORIDE SERPL-SCNC: 101 MMOL/L (ref 98–107)
CO2 SERPL-SCNC: 24.4 MMOL/L (ref 22–29)
CORTIS SERPL-MCNC: 3.51 MCG/DL
CREAT BLD-MCNC: 0.95 MG/DL (ref 0.7–1.3)
DEPRECATED RDW RBC AUTO: 52.8 FL (ref 37–54)
EOSINOPHIL # BLD AUTO: 0 10*3/MM3 (ref 0–0.4)
EOSINOPHIL NFR BLD AUTO: 0 % (ref 0.3–6.2)
ERYTHROCYTE [DISTWIDTH] IN BLOOD BY AUTOMATED COUNT: 16.4 % (ref 12.3–15.4)
GFR SERPL CREATININE-BSD FRML MDRD: 80 ML/MIN/1.73
GLOBULIN UR ELPH-MCNC: 2.3 GM/DL (ref 1.8–3.5)
GLUCOSE BLD-MCNC: 90 MG/DL (ref 74–124)
HCT VFR BLD AUTO: 41 % (ref 37.5–51)
HGB BLD-MCNC: 13 G/DL (ref 13–17.7)
IMM GRANULOCYTES # BLD AUTO: 0.09 10*3/MM3 (ref 0–0.05)
IMM GRANULOCYTES NFR BLD AUTO: 1.2 % (ref 0–0.5)
LYMPHOCYTES # BLD AUTO: 1.62 10*3/MM3 (ref 0.7–3.1)
LYMPHOCYTES NFR BLD AUTO: 21.3 % (ref 19.6–45.3)
MCH RBC QN AUTO: 28 PG (ref 26.6–33)
MCHC RBC AUTO-ENTMCNC: 31.7 G/DL (ref 31.5–35.7)
MCV RBC AUTO: 88.2 FL (ref 79–97)
MONOCYTES # BLD AUTO: 0.73 10*3/MM3 (ref 0.1–0.9)
MONOCYTES NFR BLD AUTO: 9.6 % (ref 5–12)
NEUTROPHILS # BLD AUTO: 5.13 10*3/MM3 (ref 1.7–7)
NEUTROPHILS NFR BLD AUTO: 67.5 % (ref 42.7–76)
NRBC BLD AUTO-RTO: 0 /100 WBC (ref 0–0.2)
PLATELET # BLD AUTO: 150 10*3/MM3 (ref 140–450)
PMV BLD AUTO: 9.8 FL (ref 6–12)
POTASSIUM BLD-SCNC: 4.2 MMOL/L (ref 3.5–4.7)
PROT SERPL-MCNC: 6.2 G/DL (ref 6.3–8)
RBC # BLD AUTO: 4.65 10*6/MM3 (ref 4.14–5.8)
SODIUM BLD-SCNC: 138 MMOL/L (ref 134–145)
T3FREE SERPL-MCNC: 3.27 PG/ML (ref 2–4.4)
T4 FREE SERPL-MCNC: 1.43 NG/DL (ref 0.93–1.7)
TSH SERPL DL<=0.05 MIU/L-ACNC: 2.33 MIU/ML (ref 0.27–4.2)
WBC NRBC COR # BLD: 7.6 10*3/MM3 (ref 3.4–10.8)

## 2019-05-29 PROCEDURE — 99215 OFFICE O/P EST HI 40 MIN: CPT | Performed by: INTERNAL MEDICINE

## 2019-05-29 PROCEDURE — 25010000002 NIVOLUMAB 100 MG/10ML SOLUTION 10 ML VIAL: Performed by: INTERNAL MEDICINE

## 2019-05-29 PROCEDURE — 84439 ASSAY OF FREE THYROXINE: CPT | Performed by: INTERNAL MEDICINE

## 2019-05-29 PROCEDURE — 85025 COMPLETE CBC W/AUTO DIFF WBC: CPT

## 2019-05-29 PROCEDURE — 96417 CHEMO IV INFUS EACH ADDL SEQ: CPT | Performed by: INTERNAL MEDICINE

## 2019-05-29 PROCEDURE — 96415 CHEMO IV INFUSION ADDL HR: CPT | Performed by: INTERNAL MEDICINE

## 2019-05-29 PROCEDURE — 84443 ASSAY THYROID STIM HORMONE: CPT | Performed by: INTERNAL MEDICINE

## 2019-05-29 PROCEDURE — 99214 OFFICE O/P EST MOD 30 MIN: CPT | Performed by: RADIOLOGY

## 2019-05-29 PROCEDURE — 96413 CHEMO IV INFUSION 1 HR: CPT | Performed by: INTERNAL MEDICINE

## 2019-05-29 PROCEDURE — 82533 TOTAL CORTISOL: CPT | Performed by: INTERNAL MEDICINE

## 2019-05-29 PROCEDURE — 84481 FREE ASSAY (FT-3): CPT | Performed by: INTERNAL MEDICINE

## 2019-05-29 PROCEDURE — 25010000002 IPILIMUMAB 200 MG/40ML SOLUTION 40 ML VIAL: Performed by: INTERNAL MEDICINE

## 2019-05-29 PROCEDURE — 25010000002 IPILIMUMAB 50 MG/10ML SOLUTION 10 ML VIAL: Performed by: INTERNAL MEDICINE

## 2019-05-29 PROCEDURE — 80053 COMPREHEN METABOLIC PANEL: CPT

## 2019-05-29 RX ORDER — SODIUM CHLORIDE 9 MG/ML
250 INJECTION, SOLUTION INTRAVENOUS ONCE
Status: COMPLETED | OUTPATIENT
Start: 2019-05-29 | End: 2019-05-29

## 2019-05-29 RX ADMIN — SODIUM CHLORIDE 250 MG: 900 INJECTION, SOLUTION INTRAVENOUS at 11:22

## 2019-05-29 RX ADMIN — SODIUM CHLORIDE 250 ML: 9 INJECTION, SOLUTION INTRAVENOUS at 10:10

## 2019-05-29 RX ADMIN — SODIUM CHLORIDE 90 MG: 9 INJECTION, SOLUTION INTRAVENOUS at 10:38

## 2019-05-29 NOTE — PROGRESS NOTES
[facility] OUTPATIENT FOLLOW UP CLINIC VISIT    REASON FOR FOLLOW-UP:    1.  Metastatic BRAF/NRAS mutation negative melanoma with metastatic disease present in the lung, liver bone, and brain at diagnosis.  2.  History of rheumatoid arthritis on therapy with Rituxan  3.  Right upper quadrant abdominal pain secondary to metastatic melanoma  4.  He was seen by Dr. Islas at [facility] with recommendations for ipi/nivo following stereotactic radiosurgery for his brain  5.  He was seen by Dr. Rosado with radiation oncology with plans for stereotactic radiosurgery to his 2 brain lesions with treatment planning initiated on 3/8/2019.  Stereotactic treatment completed from 3/13/2019 through 3/22/2019.  1 fraction at 2400 cGy administered to the right parietal lesion in 5 fractions to 3000 cGy administered to the left frontal lesion.  6. Opdivo Yervoy initiated on 3/20/2019.  7.  He was seen on 3/28/2019 with 2 rashes one consistent with a drug rash and the other consistent with herpes zoster.  8.  Acute hepatitis secondary to immunotherapy after cycle #2.  Treated with steroids.    HISTORY OF PRESENT ILLNESS:  Gelacio Cunningham is a 64 y.o. male with the above-mentioned history, who returns the office today anticipating cycle #3 of therapy with Opdivo and Yervoy.  There has been a delay in treatment due to the development of acute hepatitis.  Liver labs have been improving.  He finished his prednisone taper yesterday.  He tolerated this well.  He only complains today of some worsening discomfort in the right side of his abdomen and also now on the left side of his abdomen when lying on his stomach.  Therefore, he has to lie on his back at this point.  The pain has not been bad enough for him to take anything for this.    ONCOLOGIC HISTORY:  The patient has a past medical history significant for hypertension and rheumatoid arthritis.  For his rheumatoid arthritis he has been treated  with a number of agents in the past including methotrexate, Enbrel, Humira, and more recently rituximab which was started around 1-1/2 years ago with his most recent treatment administered around one month ago.  He has some chronic diffuse arthralgias which are reasonably well-controlled on rituximab.  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  This was diagnosed initially by his dermatologist Dr. Aggarwal.  He was referred to Dr. Vega and underwent surgery at [facility] in late 2017 with wide resection of the lesion and sentinel lymph node biopsy.  The patient reports that the margin was positive from the resection however lymph nodes were negative.  He had a second surgery with negative margins.  He did not receive any adjuvant therapy.     Recently, the patient developed symptoms around 2 weeks ago with mild nausea and loss of appetite.  He then developed around 1 week ago right-sided abdominal pain.  He presented to his primary care physician and underwent a right upper quadrant ultrasound on 1/24/19 showing multiple liver masses the largest of which measured 7.7 cm in the right hepatic lobe.  He was admitted and underwent CT of the abdomen and pelvis on 1/24/19 which confirmed multiple hepatic masses worrisome for metastatic disease with the largest lesion in the right lobe measuring 8.3 cm.  There was a 1 cm sclerotic nodule in the left sacral ala which was possibly related to a bone island.  There was a subtle area of narrowing around the mid transverse colon which was felt to possibly be related to wall thickening.  General surgery has been consulted and there are plans to pursue colonoscopy tomorrow.  He has undergone prior colonoscopy 12/23/16 with a single right-sided polyp removed.  He has now undergone CT chest 1/25/19 which showed 3 small pulmonary nodules in the left upper lobe measuring up to 9 mm which are indeterminate and a small 1 cm nodule in the left  lower lobe which may be focal atelectasis.  There were some trace bilateral pleural effusions.  The patient did undergo CT-guided liver biopsy 1/25/19 with pathology non-diagnostic.    He was discharged from the hospital.  He subsequently had a PET scan on 2/1/2019 which allowed us to target a different liver lesion that was not necrotic.  He did have a liver biopsy on 2/8/2019 with results consistent with metastatic melanoma.  The biopsy also showed evidence for acute cholangitis, increased portal fibrosis with mixed inflammation with lymphocytes and eosinophils as well as sinusoidal dilatation consistent with cardiac etiology.    A brain MRI on 2/25/2019 showed 2 brain lesions consistent with metastatic disease.  The largest is in the left frontal lobe measuring 3.8 cm with some evidence for hemorrhage and some vasogenic edema.  There is a smaller 5-6 mm lesion in the right parietal lobe.  He was seen by Dr. Rosado with plans for stereotactic radiosurgery with planning on 3/8/2019.      ALLERGIES:  Allergies   Allergen Reactions   • Sulindac Diarrhea       MEDICATIONS:  The medication list has been reviewed with the patient by the medical assistant, and the list has been updated in the electronic medical record, which I reviewed.  Medication dosages and frequencies were confirmed to be accurate.    I have reviewed the patient's medical history in detail and updated the computerized patient record.    Review of Systems   Constitutional: Positive for fever. Negative for appetite change, chills, diaphoresis, fatigue and unexpected weight change.   HENT:   Negative for trouble swallowing.    Respiratory: Negative for cough and shortness of breath.    Cardiovascular: Negative for chest pain and leg swelling.   Gastrointestinal: Positive for abdominal pain. Negative for abdominal distention, blood in stool, constipation, diarrhea and nausea.   Musculoskeletal: Negative for arthralgias and myalgias.   Skin: Negative for  "itching.   Neurological: Negative for dizziness and extremity weakness.   Hematological: Does not bruise/bleed easily.   Review of systems unchanged from previous office visit except as updated      Vitals:    05/29/19 0901   BP: 154/79   Pulse: 67   Resp: 16   Temp: 98.3 °F (36.8 °C)   TempSrc: Oral   SpO2: 94%   Weight: 86.8 kg (191 lb 6.4 oz)   Height: 167.6 cm (65.98\")   PainSc:   1   PainLoc: Abdomen  Comment: Abd pain when laying down       PHYSICAL EXAMINATION:  GENERAL:  Well-developed well-nourished male; awake, alert and oriented, in no acute distress.  SKIN: There is no rash present today.  HEAD:  Normocephalic, atraumatic.  EYES:  Pupils equal, round and reactive to light.  Extraocular movements intact.  Conjunctivae normal.  EARS:  Hearing intact.  NOSE:  Septum midline.  No excoriations or nasal discharge.  CHEST:  Lungs are clear to auscultation bilaterally.  No wheezes, rales, or rhonchi.  HEART:  Regular rate; normal rhythm.  No murmurs, gallops or rubs.  Abdomen: Soft, nontender, nondistended, normal active bowel sounds.  EXTREMITIES:  No clubbing, cyanosis, or edema.  NEUROLOGICAL:  No focal neurologic deficits.  Physical exam unchanged from previous office visit except as updated    DIAGNOSTIC DATA:  Results from last 7 days   Lab Units 05/29/19  0905   WBC 10*3/mm3 7.60   NEUTROS ABS 10*3/mm3 5.13   HEMOGLOBIN g/dL 13.0   HEMATOCRIT % 41.0   PLATELETS 10*3/mm3 150     Results from last 7 days   Lab Units 05/22/19  1053   CREATININE mg/dL 0.80           Imaging: MRI brain images from 5/22/2019 images personally reviewed.  Overall improvement.    IMPRESSION:  There is a late subacute to chronic hematoma within the  anterior portion of the left frontal lobe which is compatible with a  hemorrhagic melanoma metastasis. The lesion has diminished in size from  approximately 3.8 x 2.3 x 3.3 cm in greatest dimensions to 3.1 x 1.6 x  2.1 cm. Also, the surrounding vasogenic edema at the site has " resolved.  The previously noted additional focus of T1 shortening compatible with  hemorrhage within an additional metastatic lesion within the medial  portion of the right parietal lobe is no longer identified. No new areas  of metastatic disease are noted.    ASSESSMENT:  This is a 64 y.o. male with:  1. Metastatic melanoma:  · Patient with recent onset of symptoms including nausea, anorexia, right-sided abdominal pain over the 2 weeks preceeding admission in January 2019  · Abnormal right upper quadrant ultrasound showing multiple liver masses 1/24/19.  · CT abdomen and pelvis 1/24/19 confirmed multiple hepatic masses worrisome for metastatic disease with the largest lesion in the right lobe measuring 8.3 cm.  There was a 1 cm sclerotic nodule in the left sacral ala which was possibly related to a bone island.  There was a subtle area of narrowing around the mid transverse colon which was felt to possibly be related to wall thickening.  · CT chest 1/25/19 showed 3 small pulmonary nodules in the left upper lobe measuring up to 9 mm which are indeterminate and a small 1 cm nodule in the left lower lobe which may be focal atelectasis.  There were some trace bilateral pleural effusions.   · CT-guided liver biopsy 1/25/19 with pathology only showing blood.   · Colonoscopy by Dr. Rashid on 1/27 negative.  CEA 1/25/19 was normal at 1.4.   · Given the patient's history of melanoma in late 2017, there was concern regarding possible metastatic melanoma as well.  · Colonoscopy 1/27/2019 negative for malignancy.  · Discussed with Dr. Richardson with radiology.  Outpatient PET with repeat liver biopsy based on these results  · PET performed 2/1/2019  · Repeat liver biopsy on 2/8/2019 confirms metastatic melanoma with evidence also for sinusoidal dilatation consistent with cardiac etiology, increased portal fibrosis with mixed inflammation with lymphocytes and eosinophils, and acute cholangitis.  · BRAF/NRAS mutation  negative  · Brain MRI on 2/25/2019 with 2 metastatic lesions.  One measures 3.8 cm in the left frontal lobe and the other measures 5-6 mm in the right parietal lobe.   · Stereotactic radiosurgery to be completed this Friday, March 22, 2019.    · Immunotherapy initiated 3/20/2019 with Opdivo Joservoy  · Rash following cycle #1.  2 rashes present one on his trunk and one on the left arm which appeared different and more vesicular consistent with zoster.  He was treated with a Medrol Dosepak and Valtrex with prompt resolution of his symptoms.  · Cycle 2 initiated on 4/10/2019.  Plan 4 cycles followed by imaging followed by maintenance Opdivo assuming a good response.  · Fever and night sweats following cycle #2 of therapy.  Cultures negative.  These have dissipated prior to cycle #3.  · Cycle #3 of therapy held due to acute hepatitis.  · Treated with steroids.  Prednisone finished 5/28/2019.  Liver labs had been improving.  · Follow-up pending liver labs from today and if improved proceed with cycle #3 of therapy.    2. Rheumatoid arthritis:  · The patient has long-standing disease treated in the past with methotrexate, Enbrel, Humira, and more recently rituximab which was started around 1-1/2 years ago with his most recent treatment administered around late December 2018.    · He has chronic diffuse arthralgias which are reasonably well-controlled on rituximab.  · As above, as metastatic melanoma is identified, this issue may complicate use of immunotherapy.  · Currently asymptomatic from the RA.  Monitor closely.    3. History of cutaneous melanoma of the right ear:  · Diagnosed initially by his dermatologist Dr. Aggarwal, appears to have been in the pre-auricular region.  Pathology reviewed from initial biopsy showing Breslow 0.82 mm, non-ulcerated lesion with low mitotic rate.  · He was referred to Dr. Vega and underwent surgery at [facility] in December 2017 with wide resection of the lesion and  sentinel lymph node biopsy.  We do not yet have that pathology report however per notes available, there was a residual area of melanoma and sentinel nodes were negative.  He therefore underwent a second procedure for wide excision with presumably negative margins.     · He did not receive any adjuvant therapy.    4. Rash secondary to immunotherapy.  · 1 week following the initiation of Yervoy and Opdivo, the patient has developed a pruritic diffuse rash on his trunk, chest and back.  Unresponsive to OTC topical steroids.  · Treated with a medrol dose joel with prompt resolution  · He will let us know if this occurs again.  Minimal at this point    5. Suspected varicella-zoster rash of the left upper inner arm.  · Along with the development of immune mediated dermatitis, the patient also developed suspected zoster of his left upper inner arm.  This appeared very different in appearance from his immune mediated drug rash with linear vesicles.   · He was treated with Valtrex 1 g 3 times daily times 1 week with prompt resolution.  Unclear if this was really different than the other rash or not with prompt resolution and no pain involved.    6.  Pyrexia and diaphoresis: Likely secondary to immunotherapy.  Cultures were negative.  He is tolerating this well.  Hopefully this will not worsen with cycle #3.    7.  Brain metastases: Status post stereotactic radiosurgery.  Repeat MRI of the brain on 5/22/2019 with improvement.    8.  Acute hepatitis related to Opdivo and Yervoy.  Viral hepatitis studies negative.  Treated with steroids, ultimately with a prednisone taper which finished 5/28/2019.  Follow-up liver labs from today.  His lungs have continued to improve, proceed with treatment.    9.  Abdominal discomfort when lying on his stomach: This is getting a little bit worse at this point but he is not requiring any medication for this.  We will certainly monitor this and proceed with imaging sooner if it worsens.  We  discussed obtaining imaging at this point but he does not believe this is necessary.    PLAN:  1.  Follow-up CMP from today and if liver labs acceptable proceed with cycle #3 of Opdivo and Yervoy.  2.  Follow-up in 3 weeks with a nurse practitioner, labs, Opdivo and Yervoy if labs are acceptable.  3.  PET scan in 5 weeks and I will see him back in 6 weeks for follow-up.  If improvement, proceed with Opdivo maintenance every 4 weeks.  We will also need to consider treatment with Xgeva for his bone metastases.  4.  If further liver lab elevation that prevents treatment with Opdivo and Yervoy, proceed with steroids, hold further Yervoy, proceed with Opdivo if the PET scan shows improvement.    Albin Hummel MD

## 2019-06-07 ENCOUNTER — TELEPHONE (OUTPATIENT)
Dept: ONCOLOGY | Facility: HOSPITAL | Age: 64
End: 2019-06-07

## 2019-06-07 ENCOUNTER — TELEPHONE (OUTPATIENT)
Dept: GENERAL RADIOLOGY | Facility: HOSPITAL | Age: 64
End: 2019-06-07

## 2019-06-07 RX ORDER — PREDNISONE 20 MG/1
80 TABLET ORAL DAILY
Qty: 28 TABLET | Refills: 0 | Status: SHIPPED | OUTPATIENT
Start: 2019-06-07 | End: 2019-06-13 | Stop reason: SDUPTHER

## 2019-06-07 NOTE — PROGRESS NOTES
Subjective          Diagnosis Plan   1. Brain metastases (CMS/HCC)            HPI     Mr. Cunningham is a very pleasant 63-year-old white male who was diagnosed with melanoma at the preauricular region of the right ear in late 2017.  Recently he developed nausea and anorexia and was found on imaging to have widespread metastatic disease to skin lung liver bone and brain.  CT-guided core biopsy of the liver on 2/8/2019 returned metastatic melanoma.  He was seen in consultation by Dr. Hummel at the [facility] group and an MRI of the brain on 2/25 notes 2 metastases, a large anterior left frontal lobe mass measuring 3.8 x 3.3 x 2.3 cm and a 5 to 6 mm metastasis in the right parietal lobe.      He was referred for radiation therapy to these 2 brain metastases and he completed 24 Gy in a single fraction to the right parietal met, and 30 Gy in 5 fractions to the left frontal lobe lesion on 3/23/2019.  He comes today with a MRI of the brain.  The right frontal lobe hemorrhagic lesion has decreased in size significantly now 16 x 31 x 21 mm in the small right parietal lobe metastasis has resolved.  Clinically Mr. Cunningham looks well and on review of systems he reports, no headache, confusion, no visual or speech difficulty, no motor or sensory changes.                       Objective     Family History   Problem Relation Age of Onset   • COPD Father    • Cancer Father    • Asthma Brother    • Diabetes Paternal Grandfather          Past Medical History:   Diagnosis Date   • Arthritis     Rheumatoid arthritis multiple sites   • Cancer (CMS/HCC) 2017    melanoma, right ear   • DDD (degenerative disc disease), lumbar    • H/O Liver masses    • Hypertension    • Liver disease     liver lesions   • Metastatic melanoma to liver (CMS/HCC)    • Neck mass, left 2018   • Rheumatoid arthritis (CMS/HCC)     has had 14 years, was on Methotrexate, Embrel, Humira and now Rituxin         Past Surgical History:   Procedure Laterality Date   • COLONOSCOPY  N/A 12/23/2016    Procedure: COLONOSCOPY TO CECUM WITH POLYPECTOMY (COLD BIOPSY);  Surgeon: Antolin Munoz Jr., MD;  Location: [facility] ENDOSCOPY;  Service:    • COLONOSCOPY N/A 1/27/2019    Procedure: COLONOSCOPY TO CECUM;  Surgeon: Eduardo Rashid MD;  Location: [facility] ENDOSCOPY;  Service: General         Review of Systems - Oncology As above      Physical Exam   Alert, spontaneous, in no apparent distress.  Affect and thought content appropriate, speech intact, no signs of confusion, no ataxia, station and gait within normal limits.    Assessment/Plan  Metastatic melanoma to brain, 8 weeks post XRT to 2 lesions, both demonstrating treatment related response on imaging, no treatment related issues on review of systems.  Patient continues on Opdivo and Yervoy.  We have not given an appointment to see us back but he knows he should feel free to call if he has any questions or concerns, he will continue with ongoing systemic therapy with Dr. Hummel.        Cleveland Rosado MD      Cc:  No ref. provider found

## 2019-06-07 NOTE — TELEPHONE ENCOUNTER
Head to toe rash.  Hot to touch, no fever/chills.  Small red raised rash.  Per Dr. Hummel, pt needs to take prednisone 80mg daily for 1 week and see NP in 1 week to check on rash.  Wife v/u to call Monday if rash is not better.     ----- Message from Chip Smith sent at 6/7/2019 12:40 PM EDT -----  Contact: [phone]  Had treatment on 5-29. His body is covered in rash. Cream he is using no help.

## 2019-06-07 NOTE — TELEPHONE ENCOUNTER
----- Message from Eva Reyes RN sent at 6/7/2019  1:20 PM EDT -----  Per Dr. Hummel, have pt return in 1 week for cbc and NP visit for rash.  Thanks.

## 2019-06-13 ENCOUNTER — LAB (OUTPATIENT)
Dept: LAB | Facility: HOSPITAL | Age: 64
End: 2019-06-13

## 2019-06-13 ENCOUNTER — TELEPHONE (OUTPATIENT)
Dept: ONCOLOGY | Facility: HOSPITAL | Age: 64
End: 2019-06-13

## 2019-06-13 ENCOUNTER — OFFICE VISIT (OUTPATIENT)
Dept: ONCOLOGY | Facility: CLINIC | Age: 64
End: 2019-06-13

## 2019-06-13 VITALS
BODY MASS INDEX: 31.36 KG/M2 | HEART RATE: 70 BPM | WEIGHT: 195.1 LBS | RESPIRATION RATE: 18 BRPM | TEMPERATURE: 97.6 F | SYSTOLIC BLOOD PRESSURE: 154 MMHG | OXYGEN SATURATION: 96 % | DIASTOLIC BLOOD PRESSURE: 89 MMHG | HEIGHT: 66 IN

## 2019-06-13 DIAGNOSIS — R16.0 LIVER MASSES: Primary | ICD-10-CM

## 2019-06-13 DIAGNOSIS — C78.7 METASTATIC MELANOMA TO LIVER (HCC): Primary | ICD-10-CM

## 2019-06-13 LAB
BASOPHILS # BLD AUTO: 0.04 10*3/MM3 (ref 0–0.2)
BASOPHILS NFR BLD AUTO: 0.4 % (ref 0–1.5)
DEPRECATED RDW RBC AUTO: 46.3 FL (ref 37–54)
EOSINOPHIL # BLD AUTO: 0.03 10*3/MM3 (ref 0–0.4)
EOSINOPHIL NFR BLD AUTO: 0.3 % (ref 0.3–6.2)
ERYTHROCYTE [DISTWIDTH] IN BLOOD BY AUTOMATED COUNT: 15 % (ref 12.3–15.4)
HCT VFR BLD AUTO: 40.7 % (ref 37.5–51)
HGB BLD-MCNC: 13.7 G/DL (ref 13–17.7)
IMM GRANULOCYTES # BLD AUTO: 0.64 10*3/MM3 (ref 0–0.05)
IMM GRANULOCYTES NFR BLD AUTO: 5.7 % (ref 0–0.5)
LYMPHOCYTES # BLD AUTO: 0.82 10*3/MM3 (ref 0.7–3.1)
LYMPHOCYTES NFR BLD AUTO: 7.3 % (ref 19.6–45.3)
MCH RBC QN AUTO: 28.7 PG (ref 26.6–33)
MCHC RBC AUTO-ENTMCNC: 33.7 G/DL (ref 31.5–35.7)
MCV RBC AUTO: 85.1 FL (ref 79–97)
MONOCYTES # BLD AUTO: 0.36 10*3/MM3 (ref 0.1–0.9)
MONOCYTES NFR BLD AUTO: 3.2 % (ref 5–12)
NEUTROPHILS # BLD AUTO: 9.42 10*3/MM3 (ref 1.7–7)
NEUTROPHILS NFR BLD AUTO: 83.1 % (ref 42.7–76)
NRBC BLD AUTO-RTO: 0 /100 WBC (ref 0–0.2)
PLATELET # BLD AUTO: 343 10*3/MM3 (ref 140–450)
PMV BLD AUTO: 9.5 FL (ref 6–12)
RBC # BLD AUTO: 4.78 10*6/MM3 (ref 4.14–5.8)
WBC NRBC COR # BLD: 11.31 10*3/MM3 (ref 3.4–10.8)

## 2019-06-13 PROCEDURE — 36415 COLL VENOUS BLD VENIPUNCTURE: CPT | Performed by: INTERNAL MEDICINE

## 2019-06-13 PROCEDURE — 99213 OFFICE O/P EST LOW 20 MIN: CPT | Performed by: NURSE PRACTITIONER

## 2019-06-13 PROCEDURE — 85025 COMPLETE CBC W/AUTO DIFF WBC: CPT | Performed by: INTERNAL MEDICINE

## 2019-06-13 RX ORDER — PREDNISONE 20 MG/1
60 TABLET ORAL DAILY
Qty: 21 TABLET | Refills: 0 | Status: SHIPPED | OUTPATIENT
Start: 2019-06-13 | End: 2019-10-21

## 2019-06-13 RX ORDER — PREDNISONE 20 MG/1
60 TABLET ORAL DAILY
Qty: 21 TABLET | Refills: 0 | Status: SHIPPED | OUTPATIENT
Start: 2019-06-13 | End: 2019-06-13 | Stop reason: SDUPTHER

## 2019-06-13 NOTE — PROGRESS NOTES
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  He received cycle  3 Yervoy Opdivo on 5/29/2019.  He called the office shortly thereafter, 6/7/2019, with reports of a systemic, itchy, tender rash extending from his neck down.  Dr. Hummel prescribed 80 mg prednisone daily for 1 week.  He is here today for assessment and thankfully reports complete resolution of his symptoms.  He states it took approximately 3 days for his rash to improve but it quickly resolved after that.  He denies accompanying symptoms such as fever, chills, wheezing, cough, swelling bleeding or bruising.  Completed his last steroid dose today.  He is scheduled to return in 1 week, 6/20/2019 to see Dr. Hummel in anticipation of his fourth cycle.    His lab work is good today.  His CBC is stable.  His vital signs are stable.      Past Medical History, Past Surgical History, Social History, Family History have been reviewed and are without significant changes except as mentioned.    Review of Systems   Constitutional: Positive for fatigue.   HENT: Negative.    Eyes: Negative.    Respiratory: Negative.    Cardiovascular: Negative.    Gastrointestinal: Negative.    Endocrine: Negative.    Musculoskeletal: Negative.    Skin: Positive for rash.   Allergic/Immunologic: Negative.    Neurological: Negative.    Hematological: Negative.    Psychiatric/Behavioral: Negative.    All other systems reviewed and are negative.     A comprehensive 14 point review of systems was performed and was negative except as mentioned.    Medications:  The current medication list was reviewed in the EMR    ALLERGIES:    Allergies   Allergen Reactions   • Sulindac Diarrhea       Objective      Vitals:    06/13/19 1245   BP: 154/89   Pulse: 70   Resp: 18   Temp: 97.6 °F (36.4 °C)   SpO2: 96%   Weight: 88.5 kg (195 lb 1.6 oz)   Height: 167.6  "cm (65.98\")   PainSc: 0-No pain     Current Status 6/13/2019   ECOG score 0       Physical Exam   Constitutional: He is oriented to person, place, and time. He appears well-developed and well-nourished.   Eyes: Conjunctivae and EOM are normal. Pupils are equal, round, and reactive to light.   Neck: Normal range of motion. Neck supple.   Cardiovascular: Normal rate and regular rhythm.   Pulmonary/Chest: Effort normal and breath sounds normal.   Abdominal: Soft. Bowel sounds are normal. He exhibits no mass. There is no tenderness. There is no guarding.   Musculoskeletal: Normal range of motion.   Neurological: He is alert and oriented to person, place, and time.   Skin: Skin is warm and dry. No rash noted.   There is no evidence of the macular systemic rash. Patient presented photos on his phone that illustrate an erythematous, macular rash most severe in the folds of his abdomen and back   Psychiatric: He has a normal mood and affect. His behavior is normal.   Nursing note and vitals reviewed.        RECENT LABS:  Hematology WBC   Date Value Ref Range Status   06/13/2019 11.31 (H) 3.40 - 10.80 10*3/mm3 Final   01/23/2019 9.80 4.50 - 10.70 10*3/mm3 Final   12/14/2018 6.22 4.5 - 11.0 10*3/uL Final     RBC   Date Value Ref Range Status   06/13/2019 4.78 4.14 - 5.80 10*6/mm3 Final   01/23/2019 4.42 (L) 4.60 - 6.00 10*6/mm3 Final   12/14/2018 4.21 (L) 4.5 - 5.9 10*6/uL Final     Hemoglobin   Date Value Ref Range Status   06/13/2019 13.7 13.0 - 17.7 g/dL Final   12/14/2018 12.2 (L) 13.5 - 17.5 g/dL Final     Hematocrit   Date Value Ref Range Status   06/13/2019 40.7 37.5 - 51.0 % Final   12/14/2018 36.7 (L) 41.0 - 53.0 % Final     Platelets   Date Value Ref Range Status   06/13/2019 343 140 - 450 10*3/mm3 Final   12/14/2018 317 140 - 440 10*3/uL Final              Assessment/Plan   1. 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  Received his " third cycle of Yervoy Opdivo on May 29, 2019.  2.  Elevated liver enzymes related to therapy.  Slightly improved on May 29, 2019.  3.  Diffuse rash related to therapy.  Completed his seventh day of 80 mg of prednisone this morning.  His rash has completely resolved.    PLAN:  1.  I have reviewed the patient's symptoms with Dr. Hummel today.  We will taper prednisone by 20 mg daily. He will take 60mg today, and the next to days, then taper to 40mg for three days, then 20mg daily.  I have e-scribed prednisone and the patient verbalizes understanding of his dose and schedule.    He will return as scheduled to see Dr. Hummel next week, 6/20/2019 in anticipation of his fourth cycle.  At this time they will determine further therapy.  He will call the office should he experience any new or worsening symptoms before this date.                6/13/2019      CC:

## 2019-06-13 NOTE — TELEPHONE ENCOUNTER
----- Message from Maryam Guerra sent at 6/13/2019  3:17 PM EDT -----  Contact: [phone]  [facility] needs [facility] for prednisone or he will not be able to get his refill from the [facility] pharmacy.

## 2019-06-13 NOTE — TELEPHONE ENCOUNTER
PT'S WIFE CALLING ABOUT RX SENT TO WRONG PHARMACY. PREDNISONE WAS SENT TO [facility], THEN WAS SENT TO [facility]. [facility] WON[facility]T FILL UNTIL CANCELED AT [facility]. CALLED [facility] AND THEY STATE THAT IT CAN'T BE CANCELED AT THIS POINT, THAT IT HAS ALREADY BEEN PROCESSED AND IS GETTING READY TO SHIP. IT SHOULD ARRIVE Monday. NOTIFIED PT AND HE V/U. PT ASKED THAT WE SEND IN RX TO GET HIM THRU [facility] AND HE WILL JUST PAY OUT OF POCKET FOR IT. CALLED [facility] AND ASKED THEM TO DISP PREDINSONE 20MG TABS 3 TABS DAILY X 3 DAYS #9. THEY V/U AND WILL GET THAT READY FOR THE PT.

## 2019-06-20 ENCOUNTER — APPOINTMENT (OUTPATIENT)
Dept: ONCOLOGY | Facility: HOSPITAL | Age: 64
End: 2019-06-20

## 2019-06-20 ENCOUNTER — INFUSION (OUTPATIENT)
Dept: ONCOLOGY | Facility: HOSPITAL | Age: 64
End: 2019-06-20

## 2019-06-20 ENCOUNTER — OFFICE VISIT (OUTPATIENT)
Dept: ONCOLOGY | Facility: CLINIC | Age: 64
End: 2019-06-20

## 2019-06-20 VITALS
TEMPERATURE: 98.1 F | BODY MASS INDEX: 31.19 KG/M2 | HEIGHT: 66 IN | OXYGEN SATURATION: 97 % | DIASTOLIC BLOOD PRESSURE: 81 MMHG | SYSTOLIC BLOOD PRESSURE: 167 MMHG | RESPIRATION RATE: 16 BRPM | HEART RATE: 68 BPM | WEIGHT: 194.1 LBS

## 2019-06-20 DIAGNOSIS — C78.7 METASTATIC MELANOMA TO LIVER (HCC): Primary | ICD-10-CM

## 2019-06-20 DIAGNOSIS — C78.7 METASTATIC MELANOMA TO LIVER (HCC): ICD-10-CM

## 2019-06-20 LAB
ALBUMIN SERPL-MCNC: 3.9 G/DL (ref 3.5–5.2)
ALBUMIN/GLOB SERPL: 1.6 G/DL (ref 1.1–2.4)
ALP SERPL-CCNC: 100 U/L (ref 38–116)
ALT SERPL W P-5'-P-CCNC: 44 U/L (ref 0–41)
ANION GAP SERPL CALCULATED.3IONS-SCNC: 12 MMOL/L
AST SERPL-CCNC: 32 U/L (ref 0–40)
BASOPHILS # BLD AUTO: 0.04 10*3/MM3 (ref 0–0.2)
BASOPHILS NFR BLD AUTO: 0.3 % (ref 0–1.5)
BILIRUB SERPL-MCNC: 0.4 MG/DL (ref 0.2–1.2)
BUN BLD-MCNC: 11 MG/DL (ref 6–20)
BUN/CREAT SERPL: 11.7 (ref 7.3–30)
CALCIUM SPEC-SCNC: 9.3 MG/DL (ref 8.5–10.2)
CHLORIDE SERPL-SCNC: 100 MMOL/L (ref 98–107)
CO2 SERPL-SCNC: 27 MMOL/L (ref 22–29)
CORTIS SERPL-MCNC: 2.53 MCG/DL
CREAT BLD-MCNC: 0.94 MG/DL (ref 0.7–1.3)
DEPRECATED RDW RBC AUTO: 50.6 FL (ref 37–54)
EOSINOPHIL # BLD AUTO: 0 10*3/MM3 (ref 0–0.4)
EOSINOPHIL NFR BLD AUTO: 0 % (ref 0.3–6.2)
ERYTHROCYTE [DISTWIDTH] IN BLOOD BY AUTOMATED COUNT: 15.9 % (ref 12.3–15.4)
GFR SERPL CREATININE-BSD FRML MDRD: 81 ML/MIN/1.73
GLOBULIN UR ELPH-MCNC: 2.4 GM/DL (ref 1.8–3.5)
GLUCOSE BLD-MCNC: 96 MG/DL (ref 74–124)
HCT VFR BLD AUTO: 42.6 % (ref 37.5–51)
HGB BLD-MCNC: 13.7 G/DL (ref 13–17.7)
IMM GRANULOCYTES # BLD AUTO: 0.26 10*3/MM3 (ref 0–0.05)
IMM GRANULOCYTES NFR BLD AUTO: 2.1 % (ref 0–0.5)
LYMPHOCYTES # BLD AUTO: 1.86 10*3/MM3 (ref 0.7–3.1)
LYMPHOCYTES NFR BLD AUTO: 15.2 % (ref 19.6–45.3)
MCH RBC QN AUTO: 28.3 PG (ref 26.6–33)
MCHC RBC AUTO-ENTMCNC: 32.2 G/DL (ref 31.5–35.7)
MCV RBC AUTO: 88 FL (ref 79–97)
MONOCYTES # BLD AUTO: 0.88 10*3/MM3 (ref 0.1–0.9)
MONOCYTES NFR BLD AUTO: 7.2 % (ref 5–12)
NEUTROPHILS # BLD AUTO: 9.16 10*3/MM3 (ref 1.7–7)
NEUTROPHILS NFR BLD AUTO: 75.2 % (ref 42.7–76)
NRBC BLD AUTO-RTO: 0 /100 WBC (ref 0–0.2)
PLATELET # BLD AUTO: 248 10*3/MM3 (ref 140–450)
PMV BLD AUTO: 9.6 FL (ref 6–12)
POTASSIUM BLD-SCNC: 4 MMOL/L (ref 3.5–4.7)
PROT SERPL-MCNC: 6.3 G/DL (ref 6.3–8)
RBC # BLD AUTO: 4.84 10*6/MM3 (ref 4.14–5.8)
SODIUM BLD-SCNC: 139 MMOL/L (ref 134–145)
T4 FREE SERPL-MCNC: 1.62 NG/DL (ref 0.93–1.7)
TSH SERPL DL<=0.05 MIU/L-ACNC: 2.51 MIU/ML (ref 0.27–4.2)
WBC NRBC COR # BLD: 12.2 10*3/MM3 (ref 3.4–10.8)

## 2019-06-20 PROCEDURE — 85025 COMPLETE CBC W/AUTO DIFF WBC: CPT

## 2019-06-20 PROCEDURE — 82533 TOTAL CORTISOL: CPT | Performed by: INTERNAL MEDICINE

## 2019-06-20 PROCEDURE — 80053 COMPREHEN METABOLIC PANEL: CPT

## 2019-06-20 PROCEDURE — 84439 ASSAY OF FREE THYROXINE: CPT | Performed by: INTERNAL MEDICINE

## 2019-06-20 PROCEDURE — 84443 ASSAY THYROID STIM HORMONE: CPT | Performed by: INTERNAL MEDICINE

## 2019-06-20 PROCEDURE — 99213 OFFICE O/P EST LOW 20 MIN: CPT | Performed by: NURSE PRACTITIONER

## 2019-06-20 NOTE — PROGRESS NOTES
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  He received cycle  3 Yervoy Opdivo on 5/29/2019.  He experienced an itchy tender rash shortly thereafter and was prescribed 80 mg of prednisone daily.  He has since tapered down to 20 mg with complete resolution of his rash.  He is here today for scheduled Yervoy Opdivo.    Reports feeling well.  He denies any new symptoms or concerns.  His rash has completely resolved and he has not experienced any further skin issues.  His appetite is good.  His CBC is stable..      Past Medical History, Past Surgical History, Social History, Family History have been reviewed and are without significant changes except as mentioned.    Review of Systems   Constitutional: Positive for fatigue.   HENT: Negative.    Eyes: Negative.    Respiratory: Negative.    Cardiovascular: Negative.    Gastrointestinal: Negative.    Endocrine: Negative.    Musculoskeletal: Negative.    Skin: Positive for rash (resolved).   Allergic/Immunologic: Negative.    Neurological: Negative.    Hematological: Negative.    Psychiatric/Behavioral: Negative.    All other systems reviewed and are negative.     A comprehensive 14 point review of systems was performed and was negative except as mentioned.    Medications:  The current medication list was reviewed in the EMR    ALLERGIES:    Allergies   Allergen Reactions   • Sulindac Diarrhea       Objective      Vitals:    06/20/19 0913   BP: 167/81   Pulse: 68   Resp: 16   Temp: 98.1 °F (36.7 °C)   TempSrc: Oral   SpO2: 97%   Weight: 88 kg (194 lb 1.6 oz)   Height: 167.6 cm (65.98\")   PainSc: 0-No pain     Current Status 6/20/2019   ECOG score 1       Physical Exam   Constitutional: He is oriented to person, place, and time. He appears well-developed and well-nourished.   Eyes: Conjunctivae and EOM are normal. Pupils are " equal, round, and reactive to light.   Neck: Normal range of motion. Neck supple.   Cardiovascular: Normal rate and regular rhythm.   Pulmonary/Chest: Effort normal and breath sounds normal.   Abdominal: Soft. Bowel sounds are normal. He exhibits no mass. There is no tenderness. There is no guarding.   Musculoskeletal: Normal range of motion.   Neurological: He is alert and oriented to person, place, and time.   Skin: Skin is warm and dry. No rash noted.   Psychiatric: He has a normal mood and affect. His behavior is normal.   Nursing note and vitals reviewed.        RECENT LABS:  Hematology WBC   Date Value Ref Range Status   06/20/2019 12.20 (H) 3.40 - 10.80 10*3/mm3 Final   01/23/2019 9.80 4.50 - 10.70 10*3/mm3 Final   12/14/2018 6.22 4.5 - 11.0 10*3/uL Final     RBC   Date Value Ref Range Status   06/20/2019 4.84 4.14 - 5.80 10*6/mm3 Final   01/23/2019 4.42 (L) 4.60 - 6.00 10*6/mm3 Final   12/14/2018 4.21 (L) 4.5 - 5.9 10*6/uL Final     Hemoglobin   Date Value Ref Range Status   06/20/2019 13.7 13.0 - 17.7 g/dL Final   12/14/2018 12.2 (L) 13.5 - 17.5 g/dL Final     Hematocrit   Date Value Ref Range Status   06/20/2019 42.6 37.5 - 51.0 % Final   12/14/2018 36.7 (L) 41.0 - 53.0 % Final     Platelets   Date Value Ref Range Status   06/20/2019 248 140 - 450 10*3/mm3 Final   12/14/2018 317 140 - 440 10*3/uL Final              Assessment/Plan   1. 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  Received his third cycle of Yervoy Opdivo on May 29, 2019.  2.  Elevated liver enzymes related to therapy.  Improved, his ALT is 44 and AST is 32 today..  3.  Diffuse rash related to therapy.  His rash has resolved.  He has tapered down from 80 mg prednisone daily to 20 mg.    PLAN:  1.  I have reviewed the patient's case with Dr. Hummel today.  Plan to hold treatment today and continue  continue to wean off of prednisone, specifically 10 mg for the next  3 to 4 days.     We will move his PET scans up to be scheduled in the next couple of days.  He will then see Dr. Hummel to review the scans on June 27, 2019.  We plan to resume maintenance treatment at this time which will include Opdivo only.    I have asked the patient to call the office with any new or worsening symptoms.                6/20/2019      CC:

## 2019-06-24 ENCOUNTER — HOSPITAL ENCOUNTER (OUTPATIENT)
Dept: PET IMAGING | Facility: HOSPITAL | Age: 64
Discharge: HOME | End: 2019-06-24
Admitting: INTERNAL MEDICINE

## 2019-06-24 ENCOUNTER — HOSPITAL ENCOUNTER (OUTPATIENT)
Dept: PET IMAGING | Facility: HOSPITAL | Age: 64
Discharge: HOME | End: 2019-06-24

## 2019-06-24 DIAGNOSIS — C78.7 METASTATIC MELANOMA TO LIVER (HCC): ICD-10-CM

## 2019-06-24 LAB — GLUCOSE BLDC GLUCOMTR-MCNC: 83 MG/DL (ref 70–130)

## 2019-06-24 PROCEDURE — 78816 PET IMAGE W/CT FULL BODY: CPT

## 2019-06-24 PROCEDURE — 0 FLUDEOXYGLUCOSE F18 SOLUTION: Performed by: INTERNAL MEDICINE

## 2019-06-24 PROCEDURE — 82962 GLUCOSE BLOOD TEST: CPT

## 2019-06-24 PROCEDURE — A9552 F18 FDG: HCPCS | Performed by: INTERNAL MEDICINE

## 2019-06-24 RX ADMIN — FLUDEOXYGLUCOSE F18 1 DOSE: 300 INJECTION INTRAVENOUS at 11:54

## 2019-06-25 DIAGNOSIS — Z79.899 HIGH RISK MEDICATION USE: ICD-10-CM

## 2019-06-25 DIAGNOSIS — C78.7 METASTATIC MELANOMA TO LIVER (HCC): Primary | ICD-10-CM

## 2019-06-27 ENCOUNTER — OFFICE VISIT (OUTPATIENT)
Dept: ONCOLOGY | Facility: CLINIC | Age: 64
End: 2019-06-27

## 2019-06-27 ENCOUNTER — INFUSION (OUTPATIENT)
Dept: ONCOLOGY | Facility: HOSPITAL | Age: 64
End: 2019-06-27

## 2019-06-27 VITALS
OXYGEN SATURATION: 95 % | SYSTOLIC BLOOD PRESSURE: 133 MMHG | RESPIRATION RATE: 16 BRPM | WEIGHT: 193.1 LBS | HEART RATE: 74 BPM | TEMPERATURE: 98.5 F | DIASTOLIC BLOOD PRESSURE: 81 MMHG | BODY MASS INDEX: 31.03 KG/M2 | HEIGHT: 66 IN

## 2019-06-27 DIAGNOSIS — C78.7 METASTATIC MELANOMA TO LIVER (HCC): ICD-10-CM

## 2019-06-27 DIAGNOSIS — C78.7 METASTATIC MELANOMA TO LIVER (HCC): Primary | ICD-10-CM

## 2019-06-27 DIAGNOSIS — Z79.899 HIGH RISK MEDICATION USE: ICD-10-CM

## 2019-06-27 LAB
ALBUMIN SERPL-MCNC: 3.6 G/DL (ref 3.5–5.2)
ALBUMIN/GLOB SERPL: 1.5 G/DL (ref 1.1–2.4)
ALP SERPL-CCNC: 101 U/L (ref 38–116)
ALT SERPL W P-5'-P-CCNC: 78 U/L (ref 0–41)
ANION GAP SERPL CALCULATED.3IONS-SCNC: 11 MMOL/L (ref 5–15)
AST SERPL-CCNC: 62 U/L (ref 0–40)
BASOPHILS # BLD AUTO: 0.05 10*3/MM3 (ref 0–0.2)
BASOPHILS NFR BLD AUTO: 0.7 % (ref 0–1.5)
BILIRUB SERPL-MCNC: 0.6 MG/DL (ref 0.2–1.2)
BUN BLD-MCNC: 14 MG/DL (ref 6–20)
BUN/CREAT SERPL: 14.9 (ref 7.3–30)
CALCIUM SPEC-SCNC: 8.8 MG/DL (ref 8.5–10.2)
CHLORIDE SERPL-SCNC: 100 MMOL/L (ref 98–107)
CO2 SERPL-SCNC: 24 MMOL/L (ref 22–29)
CORTIS SERPL-MCNC: 14.49 MCG/DL
CREAT BLD-MCNC: 0.94 MG/DL (ref 0.7–1.3)
DEPRECATED RDW RBC AUTO: 50 FL (ref 37–54)
EOSINOPHIL # BLD AUTO: 0 10*3/MM3 (ref 0–0.4)
EOSINOPHIL NFR BLD AUTO: 0 % (ref 0.3–6.2)
ERYTHROCYTE [DISTWIDTH] IN BLOOD BY AUTOMATED COUNT: 15.9 % (ref 12.3–15.4)
GFR SERPL CREATININE-BSD FRML MDRD: 81 ML/MIN/1.73
GLOBULIN UR ELPH-MCNC: 2.4 GM/DL (ref 1.8–3.5)
GLUCOSE BLD-MCNC: 164 MG/DL (ref 74–124)
HCT VFR BLD AUTO: 39.5 % (ref 37.5–51)
HGB BLD-MCNC: 13.2 G/DL (ref 13–17.7)
IMM GRANULOCYTES # BLD AUTO: 0.09 10*3/MM3 (ref 0–0.05)
IMM GRANULOCYTES NFR BLD AUTO: 1.3 % (ref 0–0.5)
LYMPHOCYTES # BLD AUTO: 0.73 10*3/MM3 (ref 0.7–3.1)
LYMPHOCYTES NFR BLD AUTO: 10.2 % (ref 19.6–45.3)
MCH RBC QN AUTO: 28.9 PG (ref 26.6–33)
MCHC RBC AUTO-ENTMCNC: 33.4 G/DL (ref 31.5–35.7)
MCV RBC AUTO: 86.6 FL (ref 79–97)
MONOCYTES # BLD AUTO: 0.52 10*3/MM3 (ref 0.1–0.9)
MONOCYTES NFR BLD AUTO: 7.2 % (ref 5–12)
NEUTROPHILS # BLD AUTO: 5.79 10*3/MM3 (ref 1.7–7)
NEUTROPHILS NFR BLD AUTO: 80.6 % (ref 42.7–76)
NRBC BLD AUTO-RTO: 0 /100 WBC (ref 0–0.2)
PLATELET # BLD AUTO: 173 10*3/MM3 (ref 140–450)
PMV BLD AUTO: 10.1 FL (ref 6–12)
POTASSIUM BLD-SCNC: 4 MMOL/L (ref 3.5–4.7)
PROT SERPL-MCNC: 6 G/DL (ref 6.3–8)
RBC # BLD AUTO: 4.56 10*6/MM3 (ref 4.14–5.8)
SODIUM BLD-SCNC: 135 MMOL/L (ref 134–145)
T4 FREE SERPL-MCNC: 1.24 NG/DL (ref 0.93–1.7)
TSH SERPL DL<=0.05 MIU/L-ACNC: 2.51 MIU/ML (ref 0.27–4.2)
WBC NRBC COR # BLD: 7.18 10*3/MM3 (ref 3.4–10.8)

## 2019-06-27 PROCEDURE — 84443 ASSAY THYROID STIM HORMONE: CPT | Performed by: INTERNAL MEDICINE

## 2019-06-27 PROCEDURE — 85025 COMPLETE CBC W/AUTO DIFF WBC: CPT

## 2019-06-27 PROCEDURE — 96413 CHEMO IV INFUSION 1 HR: CPT | Performed by: INTERNAL MEDICINE

## 2019-06-27 PROCEDURE — 25010000002 NIVOLUMAB 240 MG/24ML SOLUTION 24 ML VIAL: Performed by: INTERNAL MEDICINE

## 2019-06-27 PROCEDURE — 82533 TOTAL CORTISOL: CPT | Performed by: INTERNAL MEDICINE

## 2019-06-27 PROCEDURE — 80053 COMPREHEN METABOLIC PANEL: CPT

## 2019-06-27 PROCEDURE — 84439 ASSAY OF FREE THYROXINE: CPT | Performed by: INTERNAL MEDICINE

## 2019-06-27 PROCEDURE — 99215 OFFICE O/P EST HI 40 MIN: CPT | Performed by: INTERNAL MEDICINE

## 2019-06-27 RX ORDER — SODIUM CHLORIDE 9 MG/ML
250 INJECTION, SOLUTION INTRAVENOUS ONCE
Status: COMPLETED | OUTPATIENT
Start: 2019-06-27 | End: 2019-06-27

## 2019-06-27 RX ORDER — SODIUM CHLORIDE 9 MG/ML
250 INJECTION, SOLUTION INTRAVENOUS ONCE
Status: CANCELLED | OUTPATIENT
Start: 2019-06-27

## 2019-06-27 RX ADMIN — SODIUM CHLORIDE 250 ML: 9 INJECTION, SOLUTION INTRAVENOUS at 08:54

## 2019-06-27 RX ADMIN — SODIUM CHLORIDE 480 MG: 9 INJECTION, SOLUTION INTRAVENOUS at 08:54

## 2019-06-27 NOTE — TELEPHONE ENCOUNTER
I spoke with a provider at [facility], the occupational health provider.  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  They will explain this to him.

## 2019-06-27 NOTE — PROGRESS NOTES
[facility] GROUP OUTPATIENT FOLLOW UP CLINIC VISIT    REASON FOR FOLLOW-UP:    1.  Metastatic BRAF/NRAS mutation negative melanoma with metastatic disease present in the lung, liver bone, and brain at diagnosis.  2.  History of rheumatoid arthritis on therapy with Rituxan  3.  Right upper quadrant abdominal pain secondary to metastatic melanoma  4.  He was seen by Dr. Islas at [facility] with recommendations for ipi/nivo following stereotactic radiosurgery for his brain  5.  He was seen by Dr. Rosado with radiation oncology with plans for stereotactic radiosurgery to his 2 brain lesions with treatment planning initiated on 3/8/2019.  Stereotactic treatment completed from 3/13/2019 through 3/22/2019.  1 fraction at 2400 cGy administered to the right parietal lesion in 5 fractions to 3000 cGy administered to the left frontal lesion.  6. Opdivo Yervoy initiated on 3/20/2019.  7.  He was seen on 3/28/2019 with 2 rashes one consistent with a drug rash and the other consistent with herpes zoster.  8.  Acute hepatitis secondary to immunotherapy after cycle #2.  Treated with steroids.  9.  He completed 3 cycles of combination therapy.  A PET scan on 6/24/2019 shows essentially complete response to therapy.  10.  Proceed with maintenance Opdivo every 4 weeks as of 6/27/2019.    HISTORY OF PRESENT ILLNESS:  Gelacio Cunningham is a 64 y.o. male with the above-mentioned history, who returns the office today for follow-up to review his PET scan anticipating proceeding with maintenance Opdivo.    He is off steroids.  Overall he has been doing well.  Within the past couple of days he did note some pruritus on his back and flanks and now has again an erythematous pruritic rash.  He is using a topical steroid which does help.  No abdominal pain.  No neurologic symptoms.  He remains eager to get back to work.    ONCOLOGIC HISTORY:  The patient has a past medical history significant for hypertension  and rheumatoid arthritis.  For his rheumatoid arthritis he has been treated with a number of agents in the past including methotrexate, Enbrel, Humira, and more recently rituximab which was started around 1-1/2 years ago with his most recent treatment administered around one month ago.  He has some chronic diffuse arthralgias which are reasonably well-controlled on rituximab.  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  This was diagnosed initially by his dermatologist Dr. Aggarwal.  He was referred to Dr. Vega and underwent surgery at [facility] in late 2017 with wide resection of the lesion and sentinel lymph node biopsy.  The patient reports that the margin was positive from the resection however lymph nodes were negative.  He had a second surgery with negative margins.  He did not receive any adjuvant therapy.     Recently, the patient developed symptoms around 2 weeks ago with mild nausea and loss of appetite.  He then developed around 1 week ago right-sided abdominal pain.  He presented to his primary care physician and underwent a right upper quadrant ultrasound on 1/24/19 showing multiple liver masses the largest of which measured 7.7 cm in the right hepatic lobe.  He was admitted and underwent CT of the abdomen and pelvis on 1/24/19 which confirmed multiple hepatic masses worrisome for metastatic disease with the largest lesion in the right lobe measuring 8.3 cm.  There was a 1 cm sclerotic nodule in the left sacral ala which was possibly related to a bone island.  There was a subtle area of narrowing around the mid transverse colon which was felt to possibly be related to wall thickening.  General surgery has been consulted and there are plans to pursue colonoscopy tomorrow.  He has undergone prior colonoscopy 12/23/16 with a single right-sided polyp removed.  He has now undergone CT chest 1/25/19 which showed 3 small pulmonary nodules in the left upper lobe  measuring up to 9 mm which are indeterminate and a small 1 cm nodule in the left lower lobe which may be focal atelectasis.  There were some trace bilateral pleural effusions.  The patient did undergo CT-guided liver biopsy 1/25/19 with pathology non-diagnostic.    He was discharged from the hospital.  He subsequently had a PET scan on 2/1/2019 which allowed us to target a different liver lesion that was not necrotic.  He did have a liver biopsy on 2/8/2019 with results consistent with metastatic melanoma.  The biopsy also showed evidence for acute cholangitis, increased portal fibrosis with mixed inflammation with lymphocytes and eosinophils as well as sinusoidal dilatation consistent with cardiac etiology.    A brain MRI on 2/25/2019 showed 2 brain lesions consistent with metastatic disease.  The largest is in the left frontal lobe measuring 3.8 cm with some evidence for hemorrhage and some vasogenic edema.  There is a smaller 5-6 mm lesion in the right parietal lobe.  He was seen by Dr. Rosado with plans for stereotactic radiosurgery with planning on 3/8/2019.    He completed radiation.    He completed 3 cycles of Opdivo and Yervoy complicated by hepatitis and extensive drug rash.  These resolved easily with high-dose prednisone.    MRI of the brain on 5/22/2019 shows a good response to radiation.    PET scan on 6/24/2019 shows essentially complete response to therapy.    As of 6/27/2019 proceed with maintenance Opdivo every 4 weeks.    ALLERGIES:  Allergies   Allergen Reactions   • Sulindac Diarrhea       MEDICATIONS:  The medication list has been reviewed with the patient by the medical assistant, and the list has been updated in the electronic medical record, which I reviewed.  Medication dosages and frequencies were confirmed to be accurate.    I have reviewed the patient's medical history in detail and updated the computerized patient record.    Review of Systems   Constitutional: Negative for appetite  "change, chills, diaphoresis, fatigue, fever and unexpected weight change.   HENT:   Negative for trouble swallowing.    Respiratory: Negative for cough and shortness of breath.    Cardiovascular: Negative for chest pain and leg swelling.   Gastrointestinal: Negative for abdominal distention, abdominal pain, blood in stool, constipation, diarrhea and nausea.   Musculoskeletal: Negative for arthralgias and myalgias.   Skin: Positive for itching and rash.   Neurological: Negative for dizziness and extremity weakness.   Hematological: Does not bruise/bleed easily.   Review of systems unchanged from previous office visit except as updated      Vitals:    06/27/19 0754   BP: 133/81   Pulse: 74   Resp: 16   Temp: 98.5 °F (36.9 °C)   TempSrc: Oral   SpO2: 95%   Weight: 87.6 kg (193 lb 1.6 oz)   Height: 167.6 cm (65.98\")   PainSc: 0-No pain  Comment: no pain but itching from chemo       PHYSICAL EXAMINATION:  GENERAL:  Well-developed well-nourished male; awake, alert and oriented, in no acute distress.  SKIN: There is a faint erythematous macular rash present on his back and flanks  HEAD:  Normocephalic, atraumatic.  EYES:  Pupils equal, round and reactive to light.  Extraocular movements intact.  Conjunctivae normal.  EARS:  Hearing intact.  NOSE:  Septum midline.  No excoriations or nasal discharge.  CHEST:  Lungs are clear to auscultation bilaterally.  No wheezes, rales, or rhonchi.  HEART:  Regular rate; normal rhythm.  No murmurs, gallops or rubs.  Abdomen: Soft, nontender, nondistended, normal active bowel sounds.  EXTREMITIES:  No clubbing, cyanosis, or edema.  NEUROLOGICAL:  No focal neurologic deficits.  Physical exam unchanged from previous office visit except as updated    DIAGNOSTIC DATA:  Results from last 7 days   Lab Units 06/27/19  0728 06/20/19  0858   WBC 10*3/mm3 7.18 12.20*   NEUTROS ABS 10*3/mm3 5.79 9.16*   HEMOGLOBIN g/dL 13.2 13.7   HEMATOCRIT % 39.5 42.6   PLATELETS 10*3/mm3 173 248     Results from " last 7 days   Lab Units 06/27/19  0740 06/20/19  0858   SODIUM mmol/L 135 139   POTASSIUM mmol/L 4.0 4.0   CHLORIDE mmol/L 100 100   CO2 mmol/L 24.0 27.0   BUN mg/dL 14 11   CREATININE mg/dL 0.94 0.94   CALCIUM mg/dL 8.8 9.3   ALBUMIN g/dL 3.60 3.90   BILIRUBIN mg/dL 0.6 0.4   ALK PHOS U/L 101 100   ALT (SGPT) U/L 78* 44*   AST (SGOT) U/L 62* 32   GLUCOSE mg/dL 164* 96           Imaging: MRI brain images from 5/22/2019.     IMPRESSION:  There is a late subacute to chronic hematoma within the  anterior portion of the left frontal lobe which is compatible with a  hemorrhagic melanoma metastasis. The lesion has diminished in size from  approximately 3.8 x 2.3 x 3.3 cm in greatest dimensions to 3.1 x 1.6 x  2.1 cm. Also, the surrounding vasogenic edema at the site has resolved.  The previously noted additional focus of T1 shortening compatible with  hemorrhage within an additional metastatic lesion within the medial  portion of the right parietal lobe is no longer identified. No new areas  of metastatic disease are noted.    PET scan on 6/24/2019 consistent with essentially a complete response to therapy.  Images personally reviewed.    IMPRESSION:  Marked interval improvement. There has been resolution of  the hypermetabolic activity at the previously seen liver, bone, and  pulmonary metastases. The liver has a more typical speckled pattern of  activity with no definite suspicious foci. Tiny residual liver  metastases can't be entirely excluded. There are currently no definite  hypermetabolic metastases.      ASSESSMENT:  This is a 64 y.o. male with:  1. Metastatic melanoma:  · Patient with recent onset of symptoms including nausea, anorexia, right-sided abdominal pain over the 2 weeks preceeding admission in January 2019  · Abnormal right upper quadrant ultrasound showing multiple liver masses 1/24/19.  · CT abdomen and pelvis 1/24/19 confirmed multiple hepatic masses worrisome for metastatic disease with the largest  lesion in the right lobe measuring 8.3 cm.  There was a 1 cm sclerotic nodule in the left sacral ala which was possibly related to a bone island.  There was a subtle area of narrowing around the mid transverse colon which was felt to possibly be related to wall thickening.  · CT chest 1/25/19 showed 3 small pulmonary nodules in the left upper lobe measuring up to 9 mm which are indeterminate and a small 1 cm nodule in the left lower lobe which may be focal atelectasis.  There were some trace bilateral pleural effusions.   · CT-guided liver biopsy 1/25/19 with pathology only showing blood.   · Colonoscopy by Dr. Rashid on 1/27 negative.  CEA 1/25/19 was normal at 1.4.   · Given the patient's history of melanoma in late 2017, there was concern regarding possible metastatic melanoma as well.  · Colonoscopy 1/27/2019 negative for malignancy.  · Discussed with Dr. Richardson with radiology.  Outpatient PET with repeat liver biopsy based on these results  · PET performed 2/1/2019  · Repeat liver biopsy on 2/8/2019 confirms metastatic melanoma with evidence also for sinusoidal dilatation consistent with cardiac etiology, increased portal fibrosis with mixed inflammation with lymphocytes and eosinophils, and acute cholangitis.  · BRAF/NRAS mutation negative  · Brain MRI on 2/25/2019 with 2 metastatic lesions.  One measures 3.8 cm in the left frontal lobe and the other measures 5-6 mm in the right parietal lobe.   · Stereotactic radiosurgery to be completed this Friday, March 22, 2019.    · Immunotherapy initiated 3/20/2019 with Opdivo Yervoy  · Rash following cycle #1.  2 rashes present one on his trunk and one on the left arm which appeared different and more vesicular consistent with zoster.  He was treated with a Medrol Dosepak and Valtrex with prompt resolution of his symptoms.  · Cycle 2 initiated on 4/10/2019.  Plan 4 cycles followed by imaging followed by maintenance Opdivo assuming a good response.  · Fever and night  sweats following cycle #2 of therapy.  Cultures negative.  These have dissipated prior to cycle #3.  · Cycle #3 of therapy held due to acute hepatitis.  · Treated with steroids.  Prednisone finished 5/28/2019.  Liver labs had been improving.  · 3 cycles of therapy ultimately completed.  Complicated by significant rash.  Improved with steroids.  · PET scan 6/24/2019 with essentially complete response to therapy.  · As of 6/27/2019, proceed with maintenance Opdivo every 4 weeks.    2. Rheumatoid arthritis:  · The patient has long-standing disease treated in the past with methotrexate, Enbrel, Humira, and more recently rituximab which was started around 1-1/2 years ago with his most recent treatment administered around late December 2018.    · He has chronic diffuse arthralgias which are reasonably well-controlled on rituximab.  · As above, as metastatic melanoma is identified, this issue may complicate use of immunotherapy.  · Currently asymptomatic from the RA.  Monitor closely.    3. History of cutaneous melanoma of the right ear:  · Diagnosed initially by his dermatologist Dr. Aggarwal, appears to have been in the pre-auricular region.  Pathology reviewed from initial biopsy showing Breslow 0.82 mm, non-ulcerated lesion with low mitotic rate.  · He was referred to Dr. Vega and underwent surgery at [facility] in December 2017 with wide resection of the lesion and sentinel lymph node biopsy.  We do not yet have that pathology report however per notes available, there was a residual area of melanoma and sentinel nodes were negative.  He therefore underwent a second procedure for wide excision with presumably negative margins.     · He did not receive any adjuvant therapy.    4. Rash secondary to immunotherapy.  · 1 week following the initiation of Yervoy and Opdivo, the patient has developed a pruritic diffuse rash on his trunk, chest and back.  Unresponsive to OTC topical steroids.  · Treated with a  medrol dose joel with prompt resolution  · Treated with high-dose steroids then after cycle #3 of therapy with improvement.  · Rash seems to be worsening at this point now that he has tapered off of oral steroids.  No further systemic steroids if this can be avoided.  Topical steroid therapy at this time.  He will notify us if this worsens.    5. Suspected varicella-zoster rash of the left upper inner arm.  · Along with the development of immune mediated dermatitis, the patient also developed suspected zoster of his left upper inner arm.  This appeared very different in appearance from his immune mediated drug rash with linear vesicles.   · He was treated with Valtrex 1 g 3 times daily times 1 week with prompt resolution.  Unclear if this was really different than the other rash or not with prompt resolution and no pain involved.    6.  Pyrexia and diaphoresis: Likely secondary to immunotherapy.  Cultures were negative.  Not currently an issue.    7.  Brain metastases: Status post stereotactic radiosurgery.  Repeat MRI of the brain on 5/22/2019 with improvement.    8.  Acute hepatitis related to Opdivo and Yervoy.  Viral hepatitis studies negative.  Treated with steroids, ultimately with a prednisone taper which finished 5/28/2019.    9.  Abdominal discomfort when lying on his stomach: Resolved with therapy    PLAN:  1.  Proceed with maintenance Opdivo today and every 4 weeks.  2.  Topical steroids for his rash and I advised him to notify us if this worsens  3.  He very much wants to get back to work.  He works as a .  I will need to speak with occupational medicine regarding this and I have left a message with them to have the physician call me back.  4.  Return in 4 weeks for follow-up for his next Opdivo.  I advised him and his wife to notify us if he has any new problems prior to his scheduled follow-up.    Albin Hummel MD

## 2019-07-03 ENCOUNTER — APPOINTMENT (OUTPATIENT)
Dept: PET IMAGING | Facility: HOSPITAL | Age: 64
End: 2019-07-03

## 2019-07-08 ENCOUNTER — HOSPITAL ENCOUNTER (OUTPATIENT)
Dept: CT IMAGING | Facility: HOSPITAL | Age: 64
Discharge: HOME | End: 2019-07-08
Admitting: NURSE PRACTITIONER

## 2019-07-08 ENCOUNTER — TELEPHONE (OUTPATIENT)
Dept: GENERAL RADIOLOGY | Facility: HOSPITAL | Age: 64
End: 2019-07-08

## 2019-07-08 ENCOUNTER — OFFICE VISIT (OUTPATIENT)
Dept: ONCOLOGY | Facility: CLINIC | Age: 64
End: 2019-07-08

## 2019-07-08 ENCOUNTER — LAB (OUTPATIENT)
Dept: LAB | Facility: HOSPITAL | Age: 64
End: 2019-07-08

## 2019-07-08 VITALS
HEART RATE: 79 BPM | HEIGHT: 66 IN | BODY MASS INDEX: 31.55 KG/M2 | OXYGEN SATURATION: 94 % | RESPIRATION RATE: 16 BRPM | TEMPERATURE: 97.6 F | WEIGHT: 196.3 LBS | SYSTOLIC BLOOD PRESSURE: 126 MMHG | DIASTOLIC BLOOD PRESSURE: 77 MMHG

## 2019-07-08 DIAGNOSIS — R16.0 LIVER MASSES: ICD-10-CM

## 2019-07-08 DIAGNOSIS — C80.1: Primary | ICD-10-CM

## 2019-07-08 DIAGNOSIS — R10.9 ACUTE ABDOMINAL PAIN: ICD-10-CM

## 2019-07-08 DIAGNOSIS — C80.1: ICD-10-CM

## 2019-07-08 DIAGNOSIS — C78.7 METASTATIC MELANOMA TO LIVER (HCC): Primary | ICD-10-CM

## 2019-07-08 DIAGNOSIS — R21 RASH: ICD-10-CM

## 2019-07-08 LAB
ALBUMIN SERPL-MCNC: 3.3 G/DL (ref 3.5–5.2)
ALBUMIN/GLOB SERPL: 1.4 G/DL (ref 1.1–2.4)
ALP SERPL-CCNC: 81 U/L (ref 38–116)
ALT SERPL W P-5'-P-CCNC: 80 U/L (ref 0–41)
ANION GAP SERPL CALCULATED.3IONS-SCNC: 9.2 MMOL/L (ref 5–15)
AST SERPL-CCNC: 75 U/L (ref 0–40)
BASOPHILS # BLD AUTO: 0.06 10*3/MM3 (ref 0–0.2)
BASOPHILS NFR BLD AUTO: 0.9 % (ref 0–1.5)
BILIRUB SERPL-MCNC: 0.8 MG/DL (ref 0.2–1.2)
BUN BLD-MCNC: 11 MG/DL (ref 6–20)
BUN/CREAT SERPL: 10.5 (ref 7.3–30)
CALCIUM SPEC-SCNC: 9 MG/DL (ref 8.5–10.2)
CHLORIDE SERPL-SCNC: 100 MMOL/L (ref 98–107)
CO2 SERPL-SCNC: 27.8 MMOL/L (ref 22–29)
CREAT BLD-MCNC: 1.05 MG/DL (ref 0.7–1.3)
CREAT BLDA-MCNC: 1 MG/DL (ref 0.6–1.3)
DEPRECATED RDW RBC AUTO: 49.2 FL (ref 37–54)
EOSINOPHIL # BLD AUTO: 0 10*3/MM3 (ref 0–0.4)
EOSINOPHIL NFR BLD AUTO: 0 % (ref 0.3–6.2)
ERYTHROCYTE [DISTWIDTH] IN BLOOD BY AUTOMATED COUNT: 15.6 % (ref 12.3–15.4)
GFR SERPL CREATININE-BSD FRML MDRD: 72 ML/MIN/1.73
GLOBULIN UR ELPH-MCNC: 2.4 GM/DL (ref 1.8–3.5)
GLUCOSE BLD-MCNC: 93 MG/DL (ref 74–124)
HCT VFR BLD AUTO: 37.3 % (ref 37.5–51)
HGB BLD-MCNC: 12.1 G/DL (ref 13–17.7)
IMM GRANULOCYTES # BLD AUTO: 0.12 10*3/MM3 (ref 0–0.05)
IMM GRANULOCYTES NFR BLD AUTO: 1.8 % (ref 0–0.5)
LYMPHOCYTES # BLD AUTO: 0.95 10*3/MM3 (ref 0.7–3.1)
LYMPHOCYTES NFR BLD AUTO: 14.2 % (ref 19.6–45.3)
MCH RBC QN AUTO: 28 PG (ref 26.6–33)
MCHC RBC AUTO-ENTMCNC: 32.4 G/DL (ref 31.5–35.7)
MCV RBC AUTO: 86.3 FL (ref 79–97)
MONOCYTES # BLD AUTO: 0.77 10*3/MM3 (ref 0.1–0.9)
MONOCYTES NFR BLD AUTO: 11.5 % (ref 5–12)
NEUTROPHILS # BLD AUTO: 4.77 10*3/MM3 (ref 1.7–7)
NEUTROPHILS NFR BLD AUTO: 71.6 % (ref 42.7–76)
NRBC BLD AUTO-RTO: 0 /100 WBC (ref 0–0.2)
PLATELET # BLD AUTO: 240 10*3/MM3 (ref 140–450)
PMV BLD AUTO: 9.1 FL (ref 6–12)
POTASSIUM BLD-SCNC: 4.1 MMOL/L (ref 3.5–4.7)
PROT SERPL-MCNC: 5.7 G/DL (ref 6.3–8)
RBC # BLD AUTO: 4.32 10*6/MM3 (ref 4.14–5.8)
SODIUM BLD-SCNC: 137 MMOL/L (ref 134–145)
WBC NRBC COR # BLD: 6.67 10*3/MM3 (ref 3.4–10.8)

## 2019-07-08 PROCEDURE — 85025 COMPLETE CBC W/AUTO DIFF WBC: CPT | Performed by: NURSE PRACTITIONER

## 2019-07-08 PROCEDURE — 82565 ASSAY OF CREATININE: CPT

## 2019-07-08 PROCEDURE — 25010000002 IOPAMIDOL 61 % SOLUTION: Performed by: NURSE PRACTITIONER

## 2019-07-08 PROCEDURE — 99215 OFFICE O/P EST HI 40 MIN: CPT | Performed by: NURSE PRACTITIONER

## 2019-07-08 PROCEDURE — 0 DIATRIZOATE MEGLUMINE & SODIUM PER 1 ML: Performed by: NURSE PRACTITIONER

## 2019-07-08 PROCEDURE — 74177 CT ABD & PELVIS W/CONTRAST: CPT

## 2019-07-08 PROCEDURE — 36415 COLL VENOUS BLD VENIPUNCTURE: CPT | Performed by: NURSE PRACTITIONER

## 2019-07-08 PROCEDURE — 80053 COMPREHEN METABOLIC PANEL: CPT | Performed by: NURSE PRACTITIONER

## 2019-07-08 RX ORDER — METHYLPREDNISOLONE 4 MG/1
TABLET ORAL
Qty: 1 EACH | Refills: 0 | Status: SHIPPED | OUTPATIENT
Start: 2019-07-08 | End: 2019-08-22

## 2019-07-08 RX ORDER — METHYLPREDNISOLONE 4 MG/1
TABLET ORAL
Qty: 1 EACH | Refills: 0 | Status: SHIPPED | OUTPATIENT
Start: 2019-07-08 | End: 2019-07-25 | Stop reason: SDUPTHER

## 2019-07-08 RX ADMIN — DIATRIZOATE MEGLUMINE AND DIATRIZOATE SODIUM 30 ML: 660; 100 LIQUID ORAL; RECTAL at 13:38

## 2019-07-08 RX ADMIN — IOPAMIDOL 85 ML: 612 INJECTION, SOLUTION INTRAVENOUS at 13:38

## 2019-07-08 NOTE — TELEPHONE ENCOUNTER
----- Message from Valarie Hope RN sent at 7/8/2019  8:48 AM EDT -----  Please schedule NP visit with Maryam melton this morning at 10:40 with a stat CBC and CMP prior to visit  thanks    ----- Message -----  From: Amrita Beaver  Sent: 7/8/2019   8:01 AM  To: [facility] Clinical Pool    [phone]   Is having issues since chem on 6-27.

## 2019-07-08 NOTE — PROGRESS NOTES
[facility] GROUP OUTPATIENT FOLLOW UP CLINIC VISIT    REASON FOR FOLLOW-UP:    1.  Metastatic BRAF/NRAS mutation negative melanoma with metastatic disease present in the lung, liver bone, and brain at diagnosis.  2.  History of rheumatoid arthritis on therapy with Rituxan  3.  Right upper quadrant abdominal pain secondary to metastatic melanoma  4.  He was seen by Dr. Islas at [facility] with recommendations for ipi/nivo following stereotactic radiosurgery for his brain  5.  He was seen by Dr. Rosado with radiation oncology with plans for stereotactic radiosurgery to his 2 brain lesions with treatment planning initiated on 3/8/2019.  Stereotactic treatment completed from 3/13/2019 through 3/22/2019.  1 fraction at 2400 cGy administered to the right parietal lesion in 5 fractions to 3000 cGy administered to the left frontal lesion.  6. Opdivo Yervoy initiated on 3/20/2019.  7.  He was seen on 3/28/2019 with 2 rashes one consistent with a drug rash and the other consistent with herpes zoster.  8.  Acute hepatitis secondary to immunotherapy after cycle #2.  Treated with steroids.  9.  He completed 3 cycles of combination therapy.  A PET scan on 6/24/2019 shows essentially complete response to therapy.  10.  Proceed with maintenance Opdivo every 4 weeks as of 6/27/2019.    HISTORY OF PRESENT ILLNESS:  Gelacio Cunningham is a 64 y.o. male with the above-mentioned history, who returns the office today for a triage visit.  He recently initiated maintenance Opdivo every 4 weeks on June 27, 2019.  Prior to this time, the patient had an erythematous, pruritic rash.  He was utilizing topical steroids for relief.  Following his treatment, the rash became more progressive and as he returns to the office today he is quite uncomfortable with generalized, severe pruritus.  Of note, patient had previously been on high-dose steroids following cycle #3 of Opdivo and Yervoy secondary to acute hepatitis  "with quick resolution of symptoms.    He reports that his skin is actually tender to touch which has  made it difficult for him to put on clothes.  He notes that he actually broke out in hives the day after treatment.     Additionally, he is short of breath, particularly with exertion.  Of most concern to the patient is significant \"internal pain.\" With every position change or \"car bump\" he feels like his organs are shifting. This is most pronounced in his abdomen. He denies any associated diarrhea. His appetite has been slightly decreased and he reports slight abdominal distention.     Finally, he has developed bilateral, lower extremity swelling over the last few days.     Labs today demonstrate a slight decline in his hemoglobin to 12.1 today.  His AST and ALT remain elevated and have worsened just slightly with an AST of 75 and ALT of 80 today.  All other labs are unremarkable.        ONCOLOGIC HISTORY:  The patient has a past medical history significant for hypertension and rheumatoid arthritis.  For his rheumatoid arthritis he has been treated with a number of agents in the past including methotrexate, Enbrel, Humira, and more recently rituximab which was started around 1-1/2 years ago with his most recent treatment administered around one month ago.  He has some chronic diffuse arthralgias which are reasonably well-controlled on rituximab.  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  This was diagnosed initially by his dermatologist Dr. Aggarwal.  He was referred to Dr. Vega and underwent surgery at [facility] in late 2017 with wide resection of the lesion and sentinel lymph node biopsy.  The patient reports that the margin was positive from the resection however lymph nodes were negative.  He had a second surgery with negative margins.  He did not receive any adjuvant therapy.     Recently, the patient developed symptoms around 2 weeks ago with mild nausea and loss " of appetite.  He then developed around 1 week ago right-sided abdominal pain.  He presented to his primary care physician and underwent a right upper quadrant ultrasound on 1/24/19 showing multiple liver masses the largest of which measured 7.7 cm in the right hepatic lobe.  He was admitted and underwent CT of the abdomen and pelvis on 1/24/19 which confirmed multiple hepatic masses worrisome for metastatic disease with the largest lesion in the right lobe measuring 8.3 cm.  There was a 1 cm sclerotic nodule in the left sacral ala which was possibly related to a bone island.  There was a subtle area of narrowing around the mid transverse colon which was felt to possibly be related to wall thickening.  General surgery has been consulted and there are plans to pursue colonoscopy tomorrow.  He has undergone prior colonoscopy 12/23/16 with a single right-sided polyp removed.  He has now undergone CT chest 1/25/19 which showed 3 small pulmonary nodules in the left upper lobe measuring up to 9 mm which are indeterminate and a small 1 cm nodule in the left lower lobe which may be focal atelectasis.  There were some trace bilateral pleural effusions.  The patient did undergo CT-guided liver biopsy 1/25/19 with pathology non-diagnostic.    He was discharged from the hospital.  He subsequently had a PET scan on 2/1/2019 which allowed us to target a different liver lesion that was not necrotic.  He did have a liver biopsy on 2/8/2019 with results consistent with metastatic melanoma.  The biopsy also showed evidence for acute cholangitis, increased portal fibrosis with mixed inflammation with lymphocytes and eosinophils as well as sinusoidal dilatation consistent with cardiac etiology.    A brain MRI on 2/25/2019 showed 2 brain lesions consistent with metastatic disease.  The largest is in the left frontal lobe measuring 3.8 cm with some evidence for hemorrhage and some vasogenic edema.  There is a smaller 5-6 mm lesion in the  right parietal lobe.  He was seen by Dr. Rosado with plans for stereotactic radiosurgery with planning on 3/8/2019.    He completed radiation.    He completed 3 cycles of Opdivo and Yervoy complicated by hepatitis and extensive drug rash.  These resolved easily with high-dose prednisone.    MRI of the brain on 5/22/2019 shows a good response to radiation.    PET scan on 6/24/2019 shows essentially complete response to therapy.    As of 6/27/2019 proceed with maintenance Opdivo every 4 weeks.    ALLERGIES:  Allergies   Allergen Reactions   • Sulindac Diarrhea       MEDICATIONS:  The medication list has been reviewed with the patient by the medical assistant, and the list has been updated in the electronic medical record, which I reviewed.  Medication dosages and frequencies were confirmed to be accurate.    I have reviewed the patient's medical history in detail and updated the computerized patient record.    Review of Systems   Constitutional: Negative for appetite change, chills, diaphoresis, fatigue, fever and unexpected weight change.   HENT:   Negative for trouble swallowing.    Respiratory: Negative for cough and shortness of breath.    Cardiovascular: Positive for leg swelling. Negative for chest pain.   Gastrointestinal: Positive for abdominal distention and abdominal pain (see HPI ). Negative for blood in stool, constipation, diarrhea and nausea.        See HPI   Musculoskeletal: Negative for arthralgias and myalgias.   Skin: Positive for itching and rash.        See HPI, worsening   Neurological: Negative for dizziness and extremity weakness.   Hematological: Does not bruise/bleed easily.   Review of systems unchanged from previous office visit except as updated      There were no vitals filed for this visit.    PHYSICAL EXAMINATION:  GENERAL:  Well-developed well-nourished male; awake, alert and oriented, in no acute distress.  SKIN: There is a faint erythematous macular rash present on his back and  flanks, this is consistent today  HEAD:  Normocephalic, atraumatic.  EYES:  Pupils equal, round and reactive to light.  Extraocular movements intact.  Conjunctivae normal.  EARS:  Hearing intact.  NOSE:  Septum midline.  No excoriations or nasal discharge.  CHEST:  Lungs are clear to auscultation bilaterally.  No wheezes, rales, or rhonchi.  HEART:  Regular rate; normal rhythm.  No murmurs, gallops or rubs.  Abdomen: distended and generalized tenderness to palpation, guarding present,  normal active bowel sounds.  EXTREMITIES:  No clubbing, cyanosis, or edema. 1+ bilateral edema   NEUROLOGICAL:  No focal neurologic deficits.      DIAGNOSTIC DATA:                Imaging: MRI brain images from 5/22/2019.     IMPRESSION:  There is a late subacute to chronic hematoma within the  anterior portion of the left frontal lobe which is compatible with a  hemorrhagic melanoma metastasis. The lesion has diminished in size from  approximately 3.8 x 2.3 x 3.3 cm in greatest dimensions to 3.1 x 1.6 x  2.1 cm. Also, the surrounding vasogenic edema at the site has resolved.  The previously noted additional focus of T1 shortening compatible with  hemorrhage within an additional metastatic lesion within the medial  portion of the right parietal lobe is no longer identified. No new areas  of metastatic disease are noted.    PET scan on 6/24/2019 consistent with essentially a complete response to therapy.  Images personally reviewed.    IMPRESSION:  Marked interval improvement. There has been resolution of  the hypermetabolic activity at the previously seen liver, bone, and  pulmonary metastases. The liver has a more typical speckled pattern of  activity with no definite suspicious foci. Tiny residual liver  metastases can't be entirely excluded. There are currently no definite  hypermetabolic metastases.      ASSESSMENT:  This is a 64 y.o. male with:  1. Metastatic melanoma:  · Patient with recent onset of symptoms including nausea,  anorexia, right-sided abdominal pain over the 2 weeks preceeding admission in January 2019  · Abnormal right upper quadrant ultrasound showing multiple liver masses 1/24/19.  · CT abdomen and pelvis 1/24/19 confirmed multiple hepatic masses worrisome for metastatic disease with the largest lesion in the right lobe measuring 8.3 cm.  There was a 1 cm sclerotic nodule in the left sacral ala which was possibly related to a bone island.  There was a subtle area of narrowing around the mid transverse colon which was felt to possibly be related to wall thickening.  · CT chest 1/25/19 showed 3 small pulmonary nodules in the left upper lobe measuring up to 9 mm which are indeterminate and a small 1 cm nodule in the left lower lobe which may be focal atelectasis.  There were some trace bilateral pleural effusions.   · CT-guided liver biopsy 1/25/19 with pathology only showing blood.   · Colonoscopy by Dr. Rashid on 1/27 negative.  CEA 1/25/19 was normal at 1.4.   · Given the patient's history of melanoma in late 2017, there was concern regarding possible metastatic melanoma as well.  · Colonoscopy 1/27/2019 negative for malignancy.  · Discussed with Dr. Richardson with radiology.  Outpatient PET with repeat liver biopsy based on these results  · PET performed 2/1/2019  · Repeat liver biopsy on 2/8/2019 confirms metastatic melanoma with evidence also for sinusoidal dilatation consistent with cardiac etiology, increased portal fibrosis with mixed inflammation with lymphocytes and eosinophils, and acute cholangitis.  · BRAF/NRAS mutation negative  · Brain MRI on 2/25/2019 with 2 metastatic lesions.  One measures 3.8 cm in the left frontal lobe and the other measures 5-6 mm in the right parietal lobe.   · Stereotactic radiosurgery to be completed this Friday, March 22, 2019.    · Immunotherapy initiated 3/20/2019 with Brandee Jaquez  · Rash following cycle #1.  2 rashes present one on his trunk and one on the left arm which  "appeared different and more vesicular consistent with zoster.  He was treated with a Medrol Dosepak and Valtrex with prompt resolution of his symptoms.  · Cycle 2 initiated on 4/10/2019.  Plan 4 cycles followed by imaging followed by maintenance Opdivo assuming a good response.  · Fever and night sweats following cycle #2 of therapy.  Cultures negative.  These have dissipated prior to cycle #3.  · Cycle #3 of therapy held due to acute hepatitis.  · Treated with steroids.  Prednisone finished 5/28/2019.  Liver labs had been improving.  · 3 cycles of therapy ultimately completed.  Complicated by significant rash.  Improved with steroids.  · PET scan 6/24/2019 with essentially complete response to therapy.  · As of 6/27/2019, proceed with maintenance Opdivo every 4 weeks.  · The patient returns today for a triage visit with a persistent and worsening rash despite topical steroid use.  This is likely an immune mediated response to his Opdivo. Additionally, he has had  \"organ discomfort\" that is aggravated with position changes.  Finally, he does have some mild, bilateral lower extremity edema.  After review with Dr. Hummel, we will proceed with a stat CT of the abdomen and pelvis to evaluate the abdominal discomfort.  We will also send a prescription for a Medrol Dosepak for rash.  We will continue to monitor the lower extremity edema and should it worsen we will consider a diuretic.  Presently, the patient is due to return on July 25, 2019 for consideration of his next maintenance dose of Opdivo.  I have asked him to call our office should the rash or other symptoms progress despite steroid therapy.    2. Rheumatoid arthritis:  · The patient has long-standing disease treated in the past with methotrexate, Enbrel, Humira, and more recently rituximab which was started around 1-1/2 years ago with his most recent treatment administered around late December 2018.    · He has chronic diffuse arthralgias which are reasonably " well-controlled on rituximab.  · As above, as metastatic melanoma is identified, this issue may complicate use of immunotherapy.  · Currently asymptomatic from the RA.  Monitor closely.  · He does have some new, bilateral lower extremity edema without erythema or pain.  He feels that this may be likely related to a flare of his rheumatoid arthritis.  We will continue to monitor this closely    3. History of cutaneous melanoma of the right ear:  · Diagnosed initially by his dermatologist Dr. Aggarwal, appears to have been in the pre-auricular region.  Pathology reviewed from initial biopsy showing Breslow 0.82 mm, non-ulcerated lesion with low mitotic rate.  · He was referred to Dr. Vega and underwent surgery at [facility] in December 2017 with wide resection of the lesion and sentinel lymph node biopsy.  We do not yet have that pathology report however per notes available, there was a residual area of melanoma and sentinel nodes were negative.  He therefore underwent a second procedure for wide excision with presumably negative margins.     · He did not receive any adjuvant therapy.    4. Rash secondary to immunotherapy.  · 1 week following the initiation of Yervoy and Opdivo, the patient has developed a pruritic diffuse rash on his trunk, chest and back.  Unresponsive to OTC topical steroids.  · Treated with a medrol dose joel with prompt resolution  · Treated with high-dose steroids then after cycle #3 of therapy with improvement.  · As above, the rash has gotten worse.  We will proceed with with a Medrol Dosepak today.  I have encouraged him to call us if symptoms do not improve over the next few days    5. Suspected varicella-zoster rash of the left upper inner arm.  · Along with the development of immune mediated dermatitis, the patient also developed suspected zoster of his left upper inner arm.  This appeared very different in appearance from his immune mediated drug rash with linear vesicles.  "  · He was treated with Valtrex 1 g 3 times daily times 1 week with prompt resolution.  Unclear if this was really different than the other rash or not with prompt resolution and no pain involved.    6.  Pyrexia and diaphoresis: Likely secondary to immunotherapy.  Cultures were negative.  Not currently an issue.    7.  Brain metastases: Status post stereotactic radiosurgery.  Repeat MRI of the brain on 5/22/2019 with improvement.    8.  Acute hepatitis related to Opdivo and Yervoy.  Viral hepatitis studies negative.  Treated with steroids, ultimately with a prednisone taper which finished 5/28/2019.    9.  Abdominal discomfort when lying on his stomach: Resolved with therapy    10.  Acute onset of abdominal discomfort: As noted above, patient describes this as his organs \"moving up and down\".  Is not confined only to his abdomen, but also below his breast bone.  This was evaluated with a stat CT of the abdomen pelvis with no acute abnormalities.  In fact, his liver lesions have actually regressed and demonstrate further hypometabolism.  There is no evidence of obstruction or free fluid.  After review with Dr. Hummel, the symptoms are presumed to be related to an autoimmune component of the Opdivo.  Questionable gastritis?  I encouraged the patient to complete the Medrol Dosepak with the addition of daily Prilosec.     PLAN:  1.  Stat CT of the abdomen and pelvis, as noted above  2.  Prescription for Medrol Dosepak sent to his pharmacy.  Patient is to take Prilosec daily with this  3.  We will continue to monitor his lower extremity edema.  He feels that this is likely related to arthritis.  4.  Patient is to return on July 25, 2019 for nurse practitioner visit and consideration of his next dose of maintenance Opdivo.  Should symptoms persist or progress, we may need to consider discontinuation of further therapy.  Dr. Hummel will be reaching out to patient's physician at [facility] for further recommendations with " symptom recurrence  5.  I have asked the patient to call our office with any new issues or further concerns prior to his next visit.  He has verbalized understanding.    -All the above reviewed with Dr. Hummel and he is in agreement.      GUSTABO Kenny

## 2019-07-08 NOTE — TELEPHONE ENCOUNTER
----- Message from Amrita Beaver sent at 7/8/2019  8:01 AM EDT -----  [phone]   Is having issues since chem on 6-27.

## 2019-07-08 NOTE — TELEPHONE ENCOUNTER
"Patient spouse calling about patient having issues since last chemo treatment - pain and tenderness in torso area, SOA, itching all over , skin very tender - difficult to put clothes on, she states he  Broke out in hives day after opdivo treatment on June 27th, lasted about 4 days and is now gone but patient is \"itching all over\" , cannot rest.   Discussed with Yuni Dunn NP and message sent to scheduling to arrange for patient to come in today and be seen by Maryam Oreilly NP  "

## 2019-07-25 ENCOUNTER — APPOINTMENT (OUTPATIENT)
Dept: ONCOLOGY | Facility: HOSPITAL | Age: 64
End: 2019-07-25

## 2019-07-25 ENCOUNTER — INFUSION (OUTPATIENT)
Dept: ONCOLOGY | Facility: HOSPITAL | Age: 64
End: 2019-07-25

## 2019-07-25 ENCOUNTER — OFFICE VISIT (OUTPATIENT)
Dept: ONCOLOGY | Facility: CLINIC | Age: 64
End: 2019-07-25

## 2019-07-25 ENCOUNTER — TELEPHONE (OUTPATIENT)
Dept: ONCOLOGY | Facility: HOSPITAL | Age: 64
End: 2019-07-25

## 2019-07-25 ENCOUNTER — APPOINTMENT (OUTPATIENT)
Dept: ONCOLOGY | Facility: CLINIC | Age: 64
End: 2019-07-25

## 2019-07-25 VITALS
RESPIRATION RATE: 16 BRPM | BODY MASS INDEX: 31.02 KG/M2 | TEMPERATURE: 98.1 F | HEART RATE: 75 BPM | HEIGHT: 66 IN | OXYGEN SATURATION: 99 % | DIASTOLIC BLOOD PRESSURE: 73 MMHG | WEIGHT: 193 LBS | SYSTOLIC BLOOD PRESSURE: 119 MMHG

## 2019-07-25 DIAGNOSIS — C78.7 METASTATIC MELANOMA TO LIVER (HCC): ICD-10-CM

## 2019-07-25 DIAGNOSIS — C78.7 METASTATIC MELANOMA TO LIVER (HCC): Primary | ICD-10-CM

## 2019-07-25 LAB
ALBUMIN SERPL-MCNC: 3.3 G/DL (ref 3.5–5.2)
ALBUMIN/GLOB SERPL: 1.4 G/DL (ref 1.1–2.4)
ALP SERPL-CCNC: 73 U/L (ref 38–116)
ALT SERPL W P-5'-P-CCNC: 70 U/L (ref 0–41)
ANION GAP SERPL CALCULATED.3IONS-SCNC: 8.7 MMOL/L (ref 5–15)
AST SERPL-CCNC: 60 U/L (ref 0–40)
BASOPHILS # BLD AUTO: 0.06 10*3/MM3 (ref 0–0.2)
BASOPHILS NFR BLD AUTO: 0.8 % (ref 0–1.5)
BILIRUB SERPL-MCNC: 0.5 MG/DL (ref 0.2–1.2)
BUN BLD-MCNC: 13 MG/DL (ref 6–20)
BUN/CREAT SERPL: 12.6 (ref 7.3–30)
CALCIUM SPEC-SCNC: 9.2 MG/DL (ref 8.5–10.2)
CHLORIDE SERPL-SCNC: 104 MMOL/L (ref 98–107)
CO2 SERPL-SCNC: 27.3 MMOL/L (ref 22–29)
CREAT BLD-MCNC: 1.03 MG/DL (ref 0.7–1.3)
DEPRECATED RDW RBC AUTO: 53.9 FL (ref 37–54)
EOSINOPHIL # BLD AUTO: 0 10*3/MM3 (ref 0–0.4)
EOSINOPHIL NFR BLD AUTO: 0 % (ref 0.3–6.2)
ERYTHROCYTE [DISTWIDTH] IN BLOOD BY AUTOMATED COUNT: 16.3 % (ref 12.3–15.4)
GFR SERPL CREATININE-BSD FRML MDRD: 73 ML/MIN/1.73
GLOBULIN UR ELPH-MCNC: 2.3 GM/DL (ref 1.8–3.5)
GLUCOSE BLD-MCNC: 97 MG/DL (ref 74–124)
HCT VFR BLD AUTO: 38.6 % (ref 37.5–51)
HGB BLD-MCNC: 12.5 G/DL (ref 13–17.7)
IMM GRANULOCYTES # BLD AUTO: 0.11 10*3/MM3 (ref 0–0.05)
IMM GRANULOCYTES NFR BLD AUTO: 1.4 % (ref 0–0.5)
LYMPHOCYTES # BLD AUTO: 1.09 10*3/MM3 (ref 0.7–3.1)
LYMPHOCYTES NFR BLD AUTO: 13.7 % (ref 19.6–45.3)
MCH RBC QN AUTO: 29.1 PG (ref 26.6–33)
MCHC RBC AUTO-ENTMCNC: 32.4 G/DL (ref 31.5–35.7)
MCV RBC AUTO: 89.8 FL (ref 79–97)
MONOCYTES # BLD AUTO: 0.67 10*3/MM3 (ref 0.1–0.9)
MONOCYTES NFR BLD AUTO: 8.4 % (ref 5–12)
NEUTROPHILS # BLD AUTO: 6.04 10*3/MM3 (ref 1.7–7)
NEUTROPHILS NFR BLD AUTO: 75.7 % (ref 42.7–76)
NRBC BLD AUTO-RTO: 0 /100 WBC (ref 0–0.2)
PLATELET # BLD AUTO: 265 10*3/MM3 (ref 140–450)
PMV BLD AUTO: 9.5 FL (ref 6–12)
POTASSIUM BLD-SCNC: 4 MMOL/L (ref 3.5–4.7)
PROT SERPL-MCNC: 5.6 G/DL (ref 6.3–8)
RBC # BLD AUTO: 4.3 10*6/MM3 (ref 4.14–5.8)
SODIUM BLD-SCNC: 140 MMOL/L (ref 134–145)
T4 FREE SERPL-MCNC: 1.37 NG/DL (ref 0.93–1.7)
TSH SERPL DL<=0.05 MIU/L-ACNC: 2.13 MIU/ML (ref 0.27–4.2)
WBC NRBC COR # BLD: 7.97 10*3/MM3 (ref 3.4–10.8)

## 2019-07-25 PROCEDURE — 85025 COMPLETE CBC W/AUTO DIFF WBC: CPT

## 2019-07-25 PROCEDURE — 84443 ASSAY THYROID STIM HORMONE: CPT | Performed by: INTERNAL MEDICINE

## 2019-07-25 PROCEDURE — 84439 ASSAY OF FREE THYROXINE: CPT | Performed by: INTERNAL MEDICINE

## 2019-07-25 PROCEDURE — 80053 COMPREHEN METABOLIC PANEL: CPT

## 2019-07-25 PROCEDURE — 99214 OFFICE O/P EST MOD 30 MIN: CPT | Performed by: NURSE PRACTITIONER

## 2019-07-25 RX ORDER — HYDROXYCHLOROQUINE SULFATE 200 MG/1
400 TABLET, FILM COATED ORAL DAILY
COMMUNITY
Start: 2019-07-16

## 2019-07-25 RX ORDER — TEMAZEPAM 7.5 MG/1
15 CAPSULE ORAL NIGHTLY PRN
Qty: 30 CAPSULE | Refills: 2 | OUTPATIENT
Start: 2019-07-25 | End: 2019-10-21

## 2019-07-25 NOTE — TELEPHONE ENCOUNTER
----- Message from Chip Smith sent at 7/25/2019  3:42 PM EDT -----  Contact: [phone]  Delio-wife  Called. Pt. Seen today by Mecca she was to call in a script for sleeping. Pharmacy has not received anything.      Spoke with Mecca, order for Restoril 15mg PO, take 1 capsule one hour before bedtime as needed for sleep #30 with 2 refills. Informed wife. Called script into [facility] pharmacy.

## 2019-07-25 NOTE — PROGRESS NOTES
[facility] GROUP OUTPATIENT FOLLOW UP CLINIC VISIT    REASON FOR FOLLOW-UP:    1.  Metastatic BRAF/NRAS mutation negative melanoma with metastatic disease present in the lung, liver bone, and brain at diagnosis.  2.  History of rheumatoid arthritis on therapy with Rituxan  3.  Right upper quadrant abdominal pain secondary to metastatic melanoma  4.  He was seen by Dr. Islas at [facility] with recommendations for ipi/nivo following stereotactic radiosurgery for his brain  5.  He was seen by Dr. Rosado with radiation oncology with plans for stereotactic radiosurgery to his 2 brain lesions with treatment planning initiated on 3/8/2019.  Stereotactic treatment completed from 3/13/2019 through 3/22/2019.  1 fraction at 2400 cGy administered to the right parietal lesion in 5 fractions to 3000 cGy administered to the left frontal lesion.  6. Opdivo Yervoy initiated on 3/20/2019.  7.  He was seen on 3/28/2019 with 2 rashes one consistent with a drug rash and the other consistent with herpes zoster.  8.  Acute hepatitis secondary to immunotherapy after cycle #2.  Treated with steroids.  9.  He completed 3 cycles of combination therapy.  A PET scan on 6/24/2019 shows essentially complete response to therapy.  10.  Proceed with maintenance Opdivo every 4 weeks as of 6/27/2019.  11. Opdivo discontinued secondary to poor tolerance 7/25/2019.    HISTORY OF PRESENT ILLNESS:  Gelacio Cunningham is a 64 y.o. male with the above-mentioned history here today for scheduled Opdivo.  Octavio has struggled throughout treatment and continues to experience significant side effects secondary to Opdivo.  He states that he has been experiencing abdominal pain that is worse after each treatment.  He was actually evaluated by CT scan last visit which revealed no acute abnormality, there was actually disease response noted.  He states that his pain is constant and worse at times with deep breathing.  His primary  concern today is his insomnia secondary to his discomfort.  He states that he is not sleeping at all throughout the night.  Should be noted that he has been on multiple doses of steroids over the past several months although he feels there is no correlation.    He continues to  experience a systemic itching and a mild rash.  The rash is slightly improved.    His appetite is poor.  He states that he has been hesitant to push fluids or eat a lot given his abdominal pain.  His bowels are moving without issue.  He denies fever, chills.    He was started on Plaquenil by his rheumatologist in hopes of getting his diffuse joint pain under control.  He does feel this is helped slightly.        ONCOLOGIC HISTORY:  The patient has a past medical history significant for hypertension and rheumatoid arthritis.  For his rheumatoid arthritis he has been treated with a number of agents in the past including methotrexate, Enbrel, Humira, and more recently rituximab which was started around 1-1/2 years ago with his most recent treatment administered around one month ago.  He has some chronic diffuse arthralgias which are reasonably well-controlled on rituximab.  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  This was diagnosed initially by his dermatologist Dr. Aggarwal.  He was referred to Dr. Vega and underwent surgery at [facility] in late 2017 with wide resection of the lesion and sentinel lymph node biopsy.  The patient reports that the margin was positive from the resection however lymph nodes were negative.  He had a second surgery with negative margins.  He did not receive any adjuvant therapy.     Recently, the patient developed symptoms around 2 weeks ago with mild nausea and loss of appetite.  He then developed around 1 week ago right-sided abdominal pain.  He presented to his primary care physician and underwent a right upper quadrant ultrasound on 1/24/19 showing multiple liver  masses the largest of which measured 7.7 cm in the right hepatic lobe.  He was admitted and underwent CT of the abdomen and pelvis on 1/24/19 which confirmed multiple hepatic masses worrisome for metastatic disease with the largest lesion in the right lobe measuring 8.3 cm.  There was a 1 cm sclerotic nodule in the left sacral ala which was possibly related to a bone island.  There was a subtle area of narrowing around the mid transverse colon which was felt to possibly be related to wall thickening.  General surgery has been consulted and there are plans to pursue colonoscopy tomorrow.  He has undergone prior colonoscopy 12/23/16 with a single right-sided polyp removed.  He has now undergone CT chest 1/25/19 which showed 3 small pulmonary nodules in the left upper lobe measuring up to 9 mm which are indeterminate and a small 1 cm nodule in the left lower lobe which may be focal atelectasis.  There were some trace bilateral pleural effusions.  The patient did undergo CT-guided liver biopsy 1/25/19 with pathology non-diagnostic.    He was discharged from the hospital.  He subsequently had a PET scan on 2/1/2019 which allowed us to target a different liver lesion that was not necrotic.  He did have a liver biopsy on 2/8/2019 with results consistent with metastatic melanoma.  The biopsy also showed evidence for acute cholangitis, increased portal fibrosis with mixed inflammation with lymphocytes and eosinophils as well as sinusoidal dilatation consistent with cardiac etiology.    A brain MRI on 2/25/2019 showed 2 brain lesions consistent with metastatic disease.  The largest is in the left frontal lobe measuring 3.8 cm with some evidence for hemorrhage and some vasogenic edema.  There is a smaller 5-6 mm lesion in the right parietal lobe.  He was seen by Dr. Rosado with plans for stereotactic radiosurgery with planning on 3/8/2019.    He completed radiation.    He completed 3 cycles of Opdivo and Yervoy complicated by  "hepatitis and extensive drug rash.  These resolved easily with high-dose prednisone.    MRI of the brain on 5/22/2019 shows a good response to radiation.    PET scan on 6/24/2019 shows essentially complete response to therapy.    As of 6/27/2019 proceed with maintenance Opdivo every 4 weeks.    ALLERGIES:  Allergies   Allergen Reactions   • Sulindac Diarrhea       MEDICATIONS:  The medication list has been reviewed with the patient by the medical assistant, and the list has been updated in the electronic medical record, which I reviewed.  Medication dosages and frequencies were confirmed to be accurate.    I have reviewed the patient's medical history in detail and updated the computerized patient record.    Review of Systems   Constitutional: Positive for fatigue. Negative for appetite change, chills, diaphoresis, fever and unexpected weight change.   HENT:   Negative for trouble swallowing.    Respiratory: Negative for cough and shortness of breath.    Cardiovascular: Negative for chest pain.   Gastrointestinal: Positive for abdominal distention and abdominal pain (see HPI ). Negative for blood in stool, constipation, diarrhea and nausea.        See HPI   Musculoskeletal: Positive for arthralgias and myalgias.   Skin: Positive for itching.        See HPI   Neurological: Negative for dizziness and extremity weakness.   Hematological: Does not bruise/bleed easily.   Psychiatric/Behavioral: Positive for sleep disturbance (see hpi).   Review of systems unchanged from previous office visit except as updated      Vitals:    07/25/19 1313   BP: 119/73   Pulse: 75   Resp: 16   Temp: 98.1 °F (36.7 °C)   SpO2: 99%   Weight: 87.5 kg (193 lb)   Height: 167.6 cm (65.98\")   PainSc:   3   PainLoc: Chest  Comment: torso       PHYSICAL EXAMINATION:  GENERAL:  Well-developed well-nourished male; awake, alert and oriented, in no acute distress.  SKIN: There is a faint erythematous macular rash present on his back and flanks it " appears slightly improved when compared to last examination.  HEAD:  Normocephalic, atraumatic.  EYES:  Pupils equal, round and reactive to light.  Extraocular movements intact.  Conjunctivae normal.  EARS:  Hearing intact.  NOSE:  Septum midline.  No excoriations or nasal discharge.  CHEST: Regular respiratory effort.  HEART:  Regular rate; normal rhythm.  No murmurs, gallops or rubs.  Abdomen: Lightly distended, normal active bowel sounds..  EXTREMITIES:  No clubbing, cyanosis, or edema. 1+ bilateral edema , stable  NEUROLOGICAL:  No focal neurologic deficits.      DIAGNOSTIC DATA:  Results from last 7 days   Lab Units 07/25/19  1239   WBC 10*3/mm3 7.97   NEUTROS ABS 10*3/mm3 6.04   HEMOGLOBIN g/dL 12.5*   HEMATOCRIT % 38.6   PLATELETS 10*3/mm3 265     Results from last 7 days   Lab Units 07/25/19  1239   SODIUM mmol/L 140   POTASSIUM mmol/L 4.0   CHLORIDE mmol/L 104   CO2 mmol/L 27.3   BUN mg/dL 13   CREATININE mg/dL 1.03   CALCIUM mg/dL 9.2   ALBUMIN g/dL 3.30*   BILIRUBIN mg/dL 0.5   ALK PHOS U/L 73   ALT (SGPT) U/L 70*   AST (SGOT) U/L 60*   GLUCOSE mg/dL 97           Imaging: MRI brain images from 5/22/2019.     IMPRESSION:  There is a late subacute to chronic hematoma within the  anterior portion of the left frontal lobe which is compatible with a  hemorrhagic melanoma metastasis. The lesion has diminished in size from  approximately 3.8 x 2.3 x 3.3 cm in greatest dimensions to 3.1 x 1.6 x  2.1 cm. Also, the surrounding vasogenic edema at the site has resolved.  The previously noted additional focus of T1 shortening compatible with  hemorrhage within an additional metastatic lesion within the medial  portion of the right parietal lobe is no longer identified. No new areas  of metastatic disease are noted.    PET scan on 6/24/2019 consistent with essentially a complete response to therapy.  Images personally reviewed.    IMPRESSION:  Marked interval improvement. There has been resolution of  the  hypermetabolic activity at the previously seen liver, bone, and  pulmonary metastases. The liver has a more typical speckled pattern of  activity with no definite suspicious foci. Tiny residual liver  metastases can't be entirely excluded. There are currently no definite  hypermetabolic metastases.      ASSESSMENT:  This is a 64 y.o. male with:  1. Metastatic melanoma:  · Patient with recent onset of symptoms including nausea, anorexia, right-sided abdominal pain over the 2 weeks preceeding admission in January 2019  · Abnormal right upper quadrant ultrasound showing multiple liver masses 1/24/19.  · CT abdomen and pelvis 1/24/19 confirmed multiple hepatic masses worrisome for metastatic disease with the largest lesion in the right lobe measuring 8.3 cm.  There was a 1 cm sclerotic nodule in the left sacral ala which was possibly related to a bone island.  There was a subtle area of narrowing around the mid transverse colon which was felt to possibly be related to wall thickening.  · CT chest 1/25/19 showed 3 small pulmonary nodules in the left upper lobe measuring up to 9 mm which are indeterminate and a small 1 cm nodule in the left lower lobe which may be focal atelectasis.  There were some trace bilateral pleural effusions.   · CT-guided liver biopsy 1/25/19 with pathology only showing blood.   · Colonoscopy by Dr. Rashid on 1/27 negative.  CEA 1/25/19 was normal at 1.4.   · Given the patient's history of melanoma in late 2017, there was concern regarding possible metastatic melanoma as well.  · Colonoscopy 1/27/2019 negative for malignancy.  · Discussed with Dr. Richardson with radiology.  Outpatient PET with repeat liver biopsy based on these results  · PET performed 2/1/2019  · Repeat liver biopsy on 2/8/2019 confirms metastatic melanoma with evidence also for sinusoidal dilatation consistent with cardiac etiology, increased portal fibrosis with mixed inflammation with lymphocytes and eosinophils, and acute  cholangitis.  · BRAF/NRAS mutation negative  · Brain MRI on 2/25/2019 with 2 metastatic lesions.  One measures 3.8 cm in the left frontal lobe and the other measures 5-6 mm in the right parietal lobe.   · Stereotactic radiosurgery to be completed this Friday, March 22, 2019.    · Immunotherapy initiated 3/20/2019 with Opdivo Yervoy  · Rash following cycle #1.  2 rashes present one on his trunk and one on the left arm which appeared different and more vesicular consistent with zoster.  He was treated with a Medrol Dosepak and Valtrex with prompt resolution of his symptoms.  · Cycle 2 initiated on 4/10/2019.  Plan 4 cycles followed by imaging followed by maintenance Opdivo assuming a good response.  · Fever and night sweats following cycle #2 of therapy.  Cultures negative.  These have dissipated prior to cycle #3.  · Cycle #3 of therapy held due to acute hepatitis.  · Treated with steroids.  Prednisone finished 5/28/2019.  Liver labs had been improving.  · 3 cycles of therapy ultimately completed.  Complicated by significant rash.  Improved with steroids.  · PET scan 6/24/2019 with essentially complete response to therapy.  · As of 6/27/2019, proceed with maintenance Opdivo every 4 weeks.  · He is here today, July 25, 2019 for scheduled Opdivo.  His performance status continues to decline with each treatment.    2. Rheumatoid arthritis:  · The patient has long-standing disease treated in the past with methotrexate, Enbrel, Humira, and more recently rituximab which was started around 1-1/2 years ago with his most recent treatment administered around late December 2018.    · He has chronic diffuse arthralgias which are reasonably well-controlled on rituximab.  · As above, as metastatic melanoma is identified, this issue may complicate use of immunotherapy.  · Currently asymptomatic from the RA.  Monitor closely.  · He recently started on Plaquenil which is moderately managing his diffuse joint pain.  3. History of  cutaneous melanoma of the right ear:  · Diagnosed initially by his dermatologist Dr. Aggarwal, appears to have been in the pre-auricular region.  Pathology reviewed from initial biopsy showing Breslow 0.82 mm, non-ulcerated lesion with low mitotic rate.  · He was referred to Dr. Vega and underwent surgery at [facility] in December 2017 with wide resection of the lesion and sentinel lymph node biopsy.  We do not yet have that pathology report however per notes available, there was a residual area of melanoma and sentinel nodes were negative.  He therefore underwent a second procedure for wide excision with presumably negative margins.     · He did not receive any adjuvant therapy.    4. Rash secondary to immunotherapy.  · 1 week following the initiation of Yervoy and Opdivo, the patient has developed a pruritic diffuse rash on his trunk, chest and back.  Unresponsive to OTC topical steroids.  · Treated with a medrol dose joel with prompt resolution  · Treated with high-dose steroids then after cycle #3 of therapy with improvement.  · He has been treated with multiple courses of steroids.  His rash in the office today is slightly improved.    5. Suspected varicella-zoster rash of the left upper inner arm.  · Along with the development of immune mediated dermatitis, the patient also developed suspected zoster of his left upper inner arm.  This appeared very different in appearance from his immune mediated drug rash with linear vesicles.   · He was treated with Valtrex 1 g 3 times daily times 1 week with prompt resolution.  Unclear if this was really different than the other rash or not with prompt resolution and no pain involved.    6.  Pyrexia and diaphoresis: Likely secondary to immunotherapy.  Cultures were negative.  Not currently an issue.    7.  Brain metastases: Status post stereotactic radiosurgery.  Repeat MRI of the brain on 5/22/2019 with improvement.    8.  Acute hepatitis related to Opdivo  and Leonid.  Viral hepatitis studies negative.  Treated with steroids, ultimately with a prednisone taper which finished 5/28/2019.  His ALT and AST are slightly improved today at 70 and 60 versus 80 and 75 respectively.    10.  Acute onset of abdominal discomfort that has progressed with recent treatments.  He was evaluated with CT scan last visit which revealed no acute abnormality.    11. Insomnia. Progressive.    PLAN:  1. I have reviewed Mr Cunningham's symptoms with Dr Hummel today. We agree that therapy should be discontinued. We will continue to monitor him closely.He will return in one month to see me. He will then undergo CT Scans in 3 months to be reviewed with Dr Hummel thereafter.  2. I have e-scribed Restoril 15mg nightly. I educated the patient about dosing and side effects.   3. He will follow up with his rheumatologist.  4. I have asked the patient to call the office with any new or worsening symptoms before his scheduled visits.       GUSTABO May

## 2019-08-06 ENCOUNTER — INFUSION (OUTPATIENT)
Dept: ONCOLOGY | Facility: HOSPITAL | Age: 64
End: 2019-08-06

## 2019-08-06 ENCOUNTER — OFFICE VISIT (OUTPATIENT)
Dept: ONCOLOGY | Facility: CLINIC | Age: 64
End: 2019-08-06

## 2019-08-06 ENCOUNTER — APPOINTMENT (OUTPATIENT)
Dept: OTHER | Facility: HOSPITAL | Age: 64
End: 2019-08-06

## 2019-08-06 ENCOUNTER — TELEPHONE (OUTPATIENT)
Dept: ONCOLOGY | Facility: CLINIC | Age: 64
End: 2019-08-06

## 2019-08-06 VITALS
DIASTOLIC BLOOD PRESSURE: 76 MMHG | HEART RATE: 86 BPM | RESPIRATION RATE: 16 BRPM | HEIGHT: 66 IN | WEIGHT: 189.8 LBS | SYSTOLIC BLOOD PRESSURE: 149 MMHG | OXYGEN SATURATION: 95 % | TEMPERATURE: 97.9 F | BODY MASS INDEX: 30.5 KG/M2

## 2019-08-06 DIAGNOSIS — C78.7 METASTATIC MELANOMA TO LIVER (HCC): Primary | ICD-10-CM

## 2019-08-06 DIAGNOSIS — C78.7 METASTATIC MELANOMA TO LIVER (HCC): ICD-10-CM

## 2019-08-06 DIAGNOSIS — D64.9 ANEMIA, UNSPECIFIED TYPE: Primary | ICD-10-CM

## 2019-08-06 DIAGNOSIS — D64.9 ANEMIA, UNSPECIFIED TYPE: ICD-10-CM

## 2019-08-06 LAB
ALBUMIN SERPL-MCNC: 3.3 G/DL (ref 3.5–5.2)
ALBUMIN/GLOB SERPL: 1.3 G/DL
ALP SERPL-CCNC: 71 U/L (ref 39–117)
ALT SERPL W P-5'-P-CCNC: 63 U/L (ref 1–41)
ANION GAP SERPL CALCULATED.3IONS-SCNC: 9.9 MMOL/L (ref 5–15)
AST SERPL-CCNC: 75 U/L (ref 1–40)
BASOPHILS # BLD AUTO: 0.08 10*3/MM3 (ref 0–0.2)
BASOPHILS NFR BLD AUTO: 1 % (ref 0–1.5)
BILIRUB SERPL-MCNC: 0.6 MG/DL (ref 0.1–1.2)
BUN BLD-MCNC: 13 MG/DL (ref 8–23)
BUN/CREAT SERPL: 10.6 (ref 7–25)
CALCIUM SPEC-SCNC: 9.6 MG/DL (ref 8.6–10.5)
CHLORIDE SERPL-SCNC: 106 MMOL/L (ref 98–107)
CO2 SERPL-SCNC: 25.1 MMOL/L (ref 22–29)
CREAT BLD-MCNC: 1.23 MG/DL (ref 0.76–1.27)
DEPRECATED RDW RBC AUTO: 51.2 FL (ref 37–54)
EOSINOPHIL # BLD AUTO: 0.2 10*3/MM3 (ref 0–0.4)
EOSINOPHIL NFR BLD AUTO: 2.6 % (ref 0.3–6.2)
ERYTHROCYTE [DISTWIDTH] IN BLOOD BY AUTOMATED COUNT: 16 % (ref 12.3–15.4)
GFR SERPL CREATININE-BSD FRML MDRD: 60 ML/MIN/1.73
GLOBULIN UR ELPH-MCNC: 2.6 GM/DL
GLUCOSE BLD-MCNC: 81 MG/DL (ref 65–99)
HCT VFR BLD AUTO: 35.8 % (ref 37.5–51)
HGB BLD-MCNC: 11.8 G/DL (ref 13–17.7)
IMM GRANULOCYTES # BLD AUTO: 0.06 10*3/MM3 (ref 0–0.05)
IMM GRANULOCYTES NFR BLD AUTO: 0.8 % (ref 0–0.5)
LYMPHOCYTES # BLD AUTO: 1.1 10*3/MM3 (ref 0.7–3.1)
LYMPHOCYTES NFR BLD AUTO: 14.3 % (ref 19.6–45.3)
MCH RBC QN AUTO: 28.8 PG (ref 26.6–33)
MCHC RBC AUTO-ENTMCNC: 33 G/DL (ref 31.5–35.7)
MCV RBC AUTO: 87.3 FL (ref 79–97)
MONOCYTES # BLD AUTO: 0.9 10*3/MM3 (ref 0.1–0.9)
MONOCYTES NFR BLD AUTO: 11.7 % (ref 5–12)
NEUTROPHILS # BLD AUTO: 5.35 10*3/MM3 (ref 1.7–7)
NEUTROPHILS NFR BLD AUTO: 69.6 % (ref 42.7–76)
NRBC BLD AUTO-RTO: 0 /100 WBC (ref 0–0.2)
PLATELET # BLD AUTO: 279 10*3/MM3 (ref 140–450)
PMV BLD AUTO: 10.2 FL (ref 6–12)
POTASSIUM BLD-SCNC: 3.9 MMOL/L (ref 3.5–5.2)
PROT SERPL-MCNC: 5.9 G/DL (ref 6–8.5)
RBC # BLD AUTO: 4.1 10*6/MM3 (ref 4.14–5.8)
SODIUM BLD-SCNC: 141 MMOL/L (ref 136–145)
T4 FREE SERPL-MCNC: 1.58 NG/DL (ref 0.93–1.7)
TSH SERPL DL<=0.05 MIU/L-ACNC: 2.07 MIU/ML (ref 0.27–4.2)
WBC NRBC COR # BLD: 7.69 10*3/MM3 (ref 3.4–10.8)

## 2019-08-06 PROCEDURE — 84443 ASSAY THYROID STIM HORMONE: CPT | Performed by: NURSE PRACTITIONER

## 2019-08-06 PROCEDURE — 96361 HYDRATE IV INFUSION ADD-ON: CPT

## 2019-08-06 PROCEDURE — 85025 COMPLETE CBC W/AUTO DIFF WBC: CPT | Performed by: NURSE PRACTITIONER

## 2019-08-06 PROCEDURE — 80053 COMPREHEN METABOLIC PANEL: CPT | Performed by: NURSE PRACTITIONER

## 2019-08-06 PROCEDURE — 96360 HYDRATION IV INFUSION INIT: CPT

## 2019-08-06 PROCEDURE — 84439 ASSAY OF FREE THYROXINE: CPT | Performed by: NURSE PRACTITIONER

## 2019-08-06 PROCEDURE — 99214 OFFICE O/P EST MOD 30 MIN: CPT | Performed by: NURSE PRACTITIONER

## 2019-08-06 RX ORDER — SODIUM CHLORIDE 9 MG/ML
500 INJECTION, SOLUTION INTRAVENOUS ONCE
Status: COMPLETED | OUTPATIENT
Start: 2019-08-06 | End: 2019-08-06

## 2019-08-06 RX ADMIN — SODIUM CHLORIDE 500 ML: 900 INJECTION, SOLUTION INTRAVENOUS at 13:00

## 2019-08-06 NOTE — TELEPHONE ENCOUNTER
----- Message from Venita Obrien sent at 8/6/2019 10:39 AM EDT -----  Contact: [phone]  Pt's wife is calling because he is having a lot of nausea, sob, and cannot eat

## 2019-08-06 NOTE — PROGRESS NOTES
[facility] GROUP OUTPATIENT FOLLOW UP CLINIC VISIT    REASON FOR FOLLOW-UP:    1.  Metastatic BRAF/NRAS mutation negative melanoma with metastatic disease present in the lung, liver bone, and brain at diagnosis.  2.  History of rheumatoid arthritis on therapy with Rituxan  3.  Right upper quadrant abdominal pain secondary to metastatic melanoma  4.  He was seen by Dr. Islas at [facility] with recommendations for ipi/nivo following stereotactic radiosurgery for his brain  5.  He was seen by Dr. Rosado with radiation oncology with plans for stereotactic radiosurgery to his 2 brain lesions with treatment planning initiated on 3/8/2019.  Stereotactic treatment completed from 3/13/2019 through 3/22/2019.  1 fraction at 2400 cGy administered to the right parietal lesion in 5 fractions to 3000 cGy administered to the left frontal lesion.  6. Opdivo Yervoy initiated on 3/20/2019.  7.  He was seen on 3/28/2019 with 2 rashes one consistent with a drug rash and the other consistent with herpes zoster.  8.  Acute hepatitis secondary to immunotherapy after cycle #2.  Treated with steroids.  9.  He completed 3 cycles of combination therapy.  A PET scan on 6/24/2019 shows essentially complete response to therapy.  10.  Proceed with maintenance Opdivo every 4 weeks as of 6/27/2019.  11. Opdivo discontinued secondary to poor tolerance 7/25/2019.  12.  Patient seen on August 6, 2019 for persistent symptoms.  Prednisone restarted at 10 mg daily.    HISTORY OF PRESENT ILLNESS:  Gelacio Cunningham is a 64 y.o. male with the above-mentioned history here today through triage with reports of persistent fatigue, abdominal pain and generalized malaise.  He explains today that his symptoms have not worsened since her last visit, however, they have not improved.  He reports frequent nausea that is sometimes managed with Zofran but not always.  He also reports a decreased appetite and overall fatigue.  He has  lost approximately 4 pounds since his last visit.  His CMP and CBC are relatively stable.  He started Plaquenil approximately 1 month ago, however, explains that he took this in his distant past without issue.    He denies bleeding, bruising, swelling.  He denies rash or skin changes.  He denies headache, double vision, speech disturbance or numbness or tingling.    His scans obtained on 7/8/2019 showed response to Opdivo.  Unfortunately, his tolerance to Opdivo was so poor we made the decision to discontinue therapy for the time being.    He does report shortness of breath on exertion.  Again, his symptoms are not new just persist without improvement or management.    He had taken prednisone in the past for Opdivo induced skin rash which provided him with relief of connor pain, skin rash and gave him a boost of energy.  He also noticed an increased appetite with his prednisone dose.    His bowels are moving without issue.    He does report improvement of his insomnia since starting Restoril which was prescribed when I saw him last visit.    ONCOLOGIC HISTORY:  The patient has a past medical history significant for hypertension and rheumatoid arthritis.  For his rheumatoid arthritis he has been treated with a number of agents in the past including methotrexate, Enbrel, Humira, and more recently rituximab which was started around 1-1/2 years ago with his most recent treatment administered around one month ago.  He has some chronic diffuse arthralgias which are reasonably well-controlled on rituximab.  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  This was diagnosed initially by his dermatologist Dr. Aggarwal.  He was referred to Dr. Vega and underwent surgery at [facility] in late 2017 with wide resection of the lesion and sentinel lymph node biopsy.  The patient reports that the margin was positive from the resection however lymph nodes were negative.  He had a second surgery  with negative margins.  He did not receive any adjuvant therapy.     Recently, the patient developed symptoms around 2 weeks ago with mild nausea and loss of appetite.  He then developed around 1 week ago right-sided abdominal pain.  He presented to his primary care physician and underwent a right upper quadrant ultrasound on 1/24/19 showing multiple liver masses the largest of which measured 7.7 cm in the right hepatic lobe.  He was admitted and underwent CT of the abdomen and pelvis on 1/24/19 which confirmed multiple hepatic masses worrisome for metastatic disease with the largest lesion in the right lobe measuring 8.3 cm.  There was a 1 cm sclerotic nodule in the left sacral ala which was possibly related to a bone island.  There was a subtle area of narrowing around the mid transverse colon which was felt to possibly be related to wall thickening.  General surgery has been consulted and there are plans to pursue colonoscopy tomorrow.  He has undergone prior colonoscopy 12/23/16 with a single right-sided polyp removed.  He has now undergone CT chest 1/25/19 which showed 3 small pulmonary nodules in the left upper lobe measuring up to 9 mm which are indeterminate and a small 1 cm nodule in the left lower lobe which may be focal atelectasis.  There were some trace bilateral pleural effusions.  The patient did undergo CT-guided liver biopsy 1/25/19 with pathology non-diagnostic.    He was discharged from the hospital.  He subsequently had a PET scan on 2/1/2019 which allowed us to target a different liver lesion that was not necrotic.  He did have a liver biopsy on 2/8/2019 with results consistent with metastatic melanoma.  The biopsy also showed evidence for acute cholangitis, increased portal fibrosis with mixed inflammation with lymphocytes and eosinophils as well as sinusoidal dilatation consistent with cardiac etiology.    A brain MRI on 2/25/2019 showed 2 brain lesions consistent with metastatic disease.  The  largest is in the left frontal lobe measuring 3.8 cm with some evidence for hemorrhage and some vasogenic edema.  There is a smaller 5-6 mm lesion in the right parietal lobe.  He was seen by Dr. oRsado with plans for stereotactic radiosurgery with planning on 3/8/2019.    He completed radiation.    He completed 3 cycles of Opdivo and Yervoy complicated by hepatitis and extensive drug rash.  These resolved easily with high-dose prednisone.    MRI of the brain on 5/22/2019 shows a good response to radiation.    PET scan on 6/24/2019 shows essentially complete response to therapy.    As of 6/27/2019 proceed with maintenance Opdivo every 4 weeks.    ALLERGIES:  Allergies   Allergen Reactions   • Sulindac Diarrhea       MEDICATIONS:  The medication list has been reviewed with the patient by the medical assistant, and the list has been updated in the electronic medical record, which I reviewed.  Medication dosages and frequencies were confirmed to be accurate.    I have reviewed the patient's medical history in detail and updated the computerized patient record.    Review of Systems   Constitutional: Positive for appetite change and fatigue. Negative for chills, diaphoresis, fever and unexpected weight change.   HENT:   Negative for trouble swallowing.    Respiratory: Negative for cough and shortness of breath.    Cardiovascular: Negative for chest pain.   Gastrointestinal: Positive for abdominal distention and abdominal pain (see HPI ). Negative for blood in stool, constipation, diarrhea and nausea.        See HPI   Musculoskeletal: Positive for arthralgias and myalgias.   Skin: Positive for itching (resolved).   Neurological: Negative for dizziness and extremity weakness.   Hematological: Does not bruise/bleed easily.   Psychiatric/Behavioral: Positive for sleep disturbance (see hpi).   Review of systems unchanged from previous office visit except as updated      There were no vitals filed for this visit.    PHYSICAL  EXAMINATION:  GENERAL:  Well-developed well-nourished male; awake, alert and oriented, in no acute distress. He is accompanied by his wife.   SKIN: Warm and dry without rash  HEAD:  Normocephalic, atraumatic.  EYES:  Pupils equal, round and reactive to light.  Extraocular movements intact.  Conjunctivae normal.  EARS:  Hearing intact.  NOSE:  Septum midline.  No excoriations or nasal discharge.  CHEST: Regular respiratory effort.  HEART:  Regular rate; normal rhythm.  No murmurs, gallops or rubs.  Abdomen: Lightly distended, normal active bowel sounds..  EXTREMITIES:  No clubbing, cyanosis, or edema. 1+ bilateral edema , stable  NEUROLOGICAL:  No focal neurologic deficits.      DIAGNOSTIC DATA:  Results from last 7 days   Lab Units 08/06/19  1304   WBC 10*3/mm3 7.69   NEUTROS ABS 10*3/mm3 5.35   HEMOGLOBIN g/dL 11.8*   HEMATOCRIT % 35.8*   PLATELETS 10*3/mm3 279     Results from last 7 days   Lab Units 08/06/19  1304   SODIUM mmol/L 141   POTASSIUM mmol/L 3.9   CHLORIDE mmol/L 106   CO2 mmol/L 25.1   BUN mg/dL 13   CREATININE mg/dL 1.23   CALCIUM mg/dL 9.6   ALBUMIN g/dL 3.30*   BILIRUBIN mg/dL 0.6   ALK PHOS U/L 71   ALT (SGPT) U/L 63*   AST (SGOT) U/L 75*   GLUCOSE mg/dL 81           Imaging: MRI brain images from 5/22/2019.     IMPRESSION:  There is a late subacute to chronic hematoma within the  anterior portion of the left frontal lobe which is compatible with a  hemorrhagic melanoma metastasis. The lesion has diminished in size from  approximately 3.8 x 2.3 x 3.3 cm in greatest dimensions to 3.1 x 1.6 x  2.1 cm. Also, the surrounding vasogenic edema at the site has resolved.  The previously noted additional focus of T1 shortening compatible with  hemorrhage within an additional metastatic lesion within the medial  portion of the right parietal lobe is no longer identified. No new areas  of metastatic disease are noted.    PET scan on 6/24/2019 consistent with essentially a complete response to therapy.  Images  personally reviewed.    IMPRESSION:  Marked interval improvement. There has been resolution of  the hypermetabolic activity at the previously seen liver, bone, and  pulmonary metastases. The liver has a more typical speckled pattern of  activity with no definite suspicious foci. Tiny residual liver  metastases can't be entirely excluded. There are currently no definite  hypermetabolic metastases.      ASSESSMENT:  This is a 64 y.o. male with:  1. Metastatic melanoma:  · Patient with recent onset of symptoms including nausea, anorexia, right-sided abdominal pain over the 2 weeks preceeding admission in January 2019  · Abnormal right upper quadrant ultrasound showing multiple liver masses 1/24/19.  · CT abdomen and pelvis 1/24/19 confirmed multiple hepatic masses worrisome for metastatic disease with the largest lesion in the right lobe measuring 8.3 cm.  There was a 1 cm sclerotic nodule in the left sacral ala which was possibly related to a bone island.  There was a subtle area of narrowing around the mid transverse colon which was felt to possibly be related to wall thickening.  · CT chest 1/25/19 showed 3 small pulmonary nodules in the left upper lobe measuring up to 9 mm which are indeterminate and a small 1 cm nodule in the left lower lobe which may be focal atelectasis.  There were some trace bilateral pleural effusions.   · CT-guided liver biopsy 1/25/19 with pathology only showing blood.   · Colonoscopy by Dr. Rashid on 1/27 negative.  CEA 1/25/19 was normal at 1.4.   · Given the patient's history of melanoma in late 2017, there was concern regarding possible metastatic melanoma as well.  · Colonoscopy 1/27/2019 negative for malignancy.  · Discussed with Dr. Richardson with radiology.  Outpatient PET with repeat liver biopsy based on these results  · PET performed 2/1/2019  · Repeat liver biopsy on 2/8/2019 confirms metastatic melanoma with evidence also for sinusoidal dilatation consistent with cardiac  etiology, increased portal fibrosis with mixed inflammation with lymphocytes and eosinophils, and acute cholangitis.  · BRAF/NRAS mutation negative  · Brain MRI on 2/25/2019 with 2 metastatic lesions.  One measures 3.8 cm in the left frontal lobe and the other measures 5-6 mm in the right parietal lobe.   · Stereotactic radiosurgery to be completed this Friday, March 22, 2019.    · Immunotherapy initiated 3/20/2019 with Opdivo Yervoy  · Rash following cycle #1.  2 rashes present one on his trunk and one on the left arm which appeared different and more vesicular consistent with zoster.  He was treated with a Medrol Dosepak and Valtrex with prompt resolution of his symptoms.  · Cycle 2 initiated on 4/10/2019.  Plan 4 cycles followed by imaging followed by maintenance Opdivo assuming a good response.  · Fever and night sweats following cycle #2 of therapy.  Cultures negative.  These have dissipated prior to cycle #3.  · Cycle #3 of therapy held due to acute hepatitis.  · Treated with steroids.  Prednisone finished 5/28/2019.  Liver labs had been improving.  · 3 cycles of therapy ultimately completed.  Complicated by significant rash.  Improved with steroids.  · PET scan 6/24/2019 with essentially complete response to therapy.  · As of 6/27/2019, proceed with maintenance Opdivo every 4 weeks.  · July 25, 2019 for scheduled Opdivo.  His performance status continues to decline with each treatment.  · He is seen August sixth 2000 18 with reports of persistent fatigue, abdominal pain, decreased appetite and nausea.    2. Rheumatoid arthritis:  · The patient has long-standing disease treated in the past with methotrexate, Enbrel, Humira, and more recently rituximab which was started around 1-1/2 years ago with his most recent treatment administered around late December 2018.    · He has chronic diffuse arthralgias which are reasonably well-controlled on rituximab.  · As above, as metastatic melanoma is identified, this  issue may complicate use of immunotherapy.  · Currently asymptomatic from the RA.  Monitor closely.  · He was started on Plaquenil approximately 1 month ago which is moderately managing his diffuse joint pain.  3. History of cutaneous melanoma of the right ear:  · Diagnosed initially by his dermatologist Dr. Aggarwal, appears to have been in the pre-auricular region.  Pathology reviewed from initial biopsy showing Breslow 0.82 mm, non-ulcerated lesion with low mitotic rate.  · He was referred to Dr. Vega and underwent surgery at [facility] in December 2017 with wide resection of the lesion and sentinel lymph node biopsy.  We do not yet have that pathology report however per notes available, there was a residual area of melanoma and sentinel nodes were negative.  He therefore underwent a second procedure for wide excision with presumably negative margins.     · He did not receive any adjuvant therapy.    4. Rash secondary to immunotherapy.  · 1 week following the initiation of Yervoy and Opdivo, the patient has developed a pruritic diffuse rash on his trunk, chest and back.  Unresponsive to OTC topical steroids.  · Treated with a medrol dose joel with prompt resolution  · Treated with high-dose steroids then after cycle #3 of therapy with improvement.  · Resolved.    5. Suspected varicella-zoster rash of the left upper inner arm.  · Along with the development of immune mediated dermatitis, the patient also developed suspected zoster of his left upper inner arm.  This appeared very different in appearance from his immune mediated drug rash with linear vesicles.   · He was treated with Valtrex 1 g 3 times daily times 1 week with prompt resolution.  Unclear if this was really different than the other rash or not with prompt resolution and no pain involved.    6.  Pyrexia and diaphoresis: Likely secondary to immunotherapy.  Cultures were negative.  Not currently an issue.    7.  Brain metastases: Status  post stereotactic radiosurgery.  Repeat MRI of the brain on 5/22/2019 with improvement.  No neurologic symptoms.    8.  Acute hepatitis related to Opdivo and Yervoy.  Viral hepatitis studies negative.  Treated with steroids, ultimately with a prednisone taper which finished 5/28/2019.  His ALT and AST 63 and 75 today.    10.  Acute onset of abdominal discomfort that has progressed with recent treatments.  He was evaluated with CT scan last visit which revealed no acute abnormality.    11. Insomnia.  Improved with Restoril.    PLAN:  1.  I have reviewed the patient's symptoms with Dr. Hummel today.  I have instructed the patient to restart prednisone 10 mg daily.  He explains that he has multiple tablets at home.  He is currently scheduled for CT scan on September 21, 2019 to be reviewed with Dr. Hummel in October.  I have asked the patient to call our office should his symptoms worsen or not improve with prednisone.  I have also of course asked the patient to call the office with any new symptoms.    2.  He will continue Restoril as prescribed.  We did discuss the effects that prednisone can have on sleep and I have asked him to start his prednisone early in the morning.  He verbalizes understanding.      GUSTABO May

## 2019-08-06 NOTE — TELEPHONE ENCOUNTER
S/w pt. And his wife.  Pt. concerned that he has no appetite, having a lot of nausea in which the zofran is no longer working for him.  Increase weakness.  Only getting in about 2 glasses of lqd daily.  Has been off the opdivo for about 6 wks now.  Hasn't been on any steroids for over 8 wks.  Reports some soa which doesn't seem to be improving.  Pt. Went back to work, but soon realized he couldn't do the work.  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  Pt. States he feels like he is declining.  All d/w Mecca Prieto NP and FRANCINE DELGADILLO RN   Pt. Will go to [place] and get a cbc, start cmp, and possible ivf's and see Mecca.  Informed pt. Wife the appt desk will call her and let her know what time to be there.  V/u.

## 2019-08-07 ENCOUNTER — TELEPHONE (OUTPATIENT)
Dept: ONCOLOGY | Facility: HOSPITAL | Age: 64
End: 2019-08-07

## 2019-08-07 NOTE — TELEPHONE ENCOUNTER
----- Message from Bety Aldana sent at 8/7/2019  3:59 PM EDT -----  [phone]    Needs a letter so long term disability benefits won't be stopped.  Said he was on short term, tried to go back to work and couldn't work so now he's needs to be on long term.  Wants Dr. Hummel to do letter.  Wants to talk to someone today.

## 2019-08-07 NOTE — TELEPHONE ENCOUNTER
Pt called and he emphasized that Dr. Hummel must be the one that signs the long term disability papers.  Harriett JOHNSON MA is aware of need for letter and will have Dr. Hummel sign the letter.  Harriett he.

## 2019-08-08 ENCOUNTER — TELEPHONE (OUTPATIENT)
Dept: ONCOLOGY | Facility: CLINIC | Age: 64
End: 2019-08-08

## 2019-08-08 NOTE — TELEPHONE ENCOUNTER
Pt returned call and a letter was written stating reason for request to resume disability.  Pt will  at [facility] office per Dr. Hummel's review and signature.

## 2019-08-08 NOTE — TELEPHONE ENCOUNTER
Left message for patient asking him to return my phone call in regard to what he would like the letter to say regarding disability.

## 2019-08-22 ENCOUNTER — OFFICE VISIT (OUTPATIENT)
Dept: ONCOLOGY | Facility: CLINIC | Age: 64
End: 2019-08-22

## 2019-08-22 ENCOUNTER — LAB (OUTPATIENT)
Dept: LAB | Facility: HOSPITAL | Age: 64
End: 2019-08-22

## 2019-08-22 VITALS
DIASTOLIC BLOOD PRESSURE: 74 MMHG | BODY MASS INDEX: 29.67 KG/M2 | RESPIRATION RATE: 16 BRPM | HEIGHT: 66 IN | SYSTOLIC BLOOD PRESSURE: 160 MMHG | OXYGEN SATURATION: 95 % | HEART RATE: 69 BPM | TEMPERATURE: 98.3 F | WEIGHT: 184.6 LBS

## 2019-08-22 DIAGNOSIS — C78.7 METASTATIC MELANOMA TO LIVER (HCC): Primary | ICD-10-CM

## 2019-08-22 LAB
ALBUMIN SERPL-MCNC: 3.4 G/DL (ref 3.5–5.2)
ALBUMIN/GLOB SERPL: 1.3 G/DL (ref 1.1–2.4)
ALP SERPL-CCNC: 68 U/L (ref 38–116)
ALT SERPL W P-5'-P-CCNC: 47 U/L (ref 0–41)
ANION GAP SERPL CALCULATED.3IONS-SCNC: 8.2 MMOL/L (ref 5–15)
AST SERPL-CCNC: 42 U/L (ref 0–40)
BASOPHILS # BLD AUTO: 0.04 10*3/MM3 (ref 0–0.2)
BASOPHILS NFR BLD AUTO: 0.5 % (ref 0–1.5)
BILIRUB SERPL-MCNC: 0.5 MG/DL (ref 0.2–1.2)
BUN BLD-MCNC: 13 MG/DL (ref 6–20)
BUN/CREAT SERPL: 13 (ref 7.3–30)
CALCIUM SPEC-SCNC: 8.8 MG/DL (ref 8.5–10.2)
CHLORIDE SERPL-SCNC: 102 MMOL/L (ref 98–107)
CO2 SERPL-SCNC: 27.8 MMOL/L (ref 22–29)
CREAT BLD-MCNC: 1 MG/DL (ref 0.7–1.3)
DEPRECATED RDW RBC AUTO: 52.3 FL (ref 37–54)
EOSINOPHIL # BLD AUTO: 0.38 10*3/MM3 (ref 0–0.4)
EOSINOPHIL NFR BLD AUTO: 5.1 % (ref 0.3–6.2)
ERYTHROCYTE [DISTWIDTH] IN BLOOD BY AUTOMATED COUNT: 15.3 % (ref 12.3–15.4)
GFR SERPL CREATININE-BSD FRML MDRD: 76 ML/MIN/1.73
GLOBULIN UR ELPH-MCNC: 2.6 GM/DL (ref 1.8–3.5)
GLUCOSE BLD-MCNC: 107 MG/DL (ref 74–124)
HCT VFR BLD AUTO: 39.6 % (ref 37.5–51)
HGB BLD-MCNC: 12.7 G/DL (ref 13–17.7)
IMM GRANULOCYTES # BLD AUTO: 0.04 10*3/MM3 (ref 0–0.05)
IMM GRANULOCYTES NFR BLD AUTO: 0.5 % (ref 0–0.5)
LYMPHOCYTES # BLD AUTO: 1.16 10*3/MM3 (ref 0.7–3.1)
LYMPHOCYTES NFR BLD AUTO: 15.7 % (ref 19.6–45.3)
MCH RBC QN AUTO: 29.9 PG (ref 26.6–33)
MCHC RBC AUTO-ENTMCNC: 32.1 G/DL (ref 31.5–35.7)
MCV RBC AUTO: 93.2 FL (ref 79–97)
MONOCYTES # BLD AUTO: 0.67 10*3/MM3 (ref 0.1–0.9)
MONOCYTES NFR BLD AUTO: 9.1 % (ref 5–12)
NEUTROPHILS # BLD AUTO: 5.1 10*3/MM3 (ref 1.7–7)
NEUTROPHILS NFR BLD AUTO: 69.1 % (ref 42.7–76)
NRBC BLD AUTO-RTO: 0 /100 WBC (ref 0–0.2)
PLATELET # BLD AUTO: 228 10*3/MM3 (ref 140–450)
PMV BLD AUTO: 9.7 FL (ref 6–12)
POTASSIUM BLD-SCNC: 3.9 MMOL/L (ref 3.5–4.7)
PROT SERPL-MCNC: 6 G/DL (ref 6.3–8)
RBC # BLD AUTO: 4.25 10*6/MM3 (ref 4.14–5.8)
SODIUM BLD-SCNC: 138 MMOL/L (ref 134–145)
WBC NRBC COR # BLD: 7.39 10*3/MM3 (ref 3.4–10.8)

## 2019-08-22 PROCEDURE — 80053 COMPREHEN METABOLIC PANEL: CPT

## 2019-08-22 PROCEDURE — 36415 COLL VENOUS BLD VENIPUNCTURE: CPT

## 2019-08-22 PROCEDURE — 85025 COMPLETE CBC W/AUTO DIFF WBC: CPT

## 2019-08-22 PROCEDURE — 99213 OFFICE O/P EST LOW 20 MIN: CPT | Performed by: NURSE PRACTITIONER

## 2019-08-22 RX ORDER — ONDANSETRON HYDROCHLORIDE 8 MG/1
8 TABLET, FILM COATED ORAL EVERY 8 HOURS PRN
Qty: 30 TABLET | Refills: 2 | Status: SHIPPED | OUTPATIENT
Start: 2019-08-22 | End: 2020-06-02

## 2019-08-22 NOTE — PROGRESS NOTES
[facility] GROUP OUTPATIENT FOLLOW UP CLINIC VISIT    REASON FOR FOLLOW-UP:    1.  Metastatic BRAF/NRAS mutation negative melanoma with metastatic disease present in the lung, liver bone, and brain at diagnosis.  2.  History of rheumatoid arthritis on therapy with Rituxan  3.  Right upper quadrant abdominal pain secondary to metastatic melanoma  4.  He was seen by Dr. Islas at [facility] with recommendations for ipi/nivo following stereotactic radiosurgery for his brain  5.  He was seen by Dr. Rosado with radiation oncology with plans for stereotactic radiosurgery to his 2 brain lesions with treatment planning initiated on 3/8/2019.  Stereotactic treatment completed from 3/13/2019 through 3/22/2019.  1 fraction at 2400 cGy administered to the right parietal lesion in 5 fractions to 3000 cGy administered to the left frontal lesion.  6. Opdivo Yervoy initiated on 3/20/2019.  7.  He was seen on 3/28/2019 with 2 rashes one consistent with a drug rash and the other consistent with herpes zoster.  8.  Acute hepatitis secondary to immunotherapy after cycle #2.  Treated with steroids.  9.  He completed 3 cycles of combination therapy.  A PET scan on 6/24/2019 shows essentially complete response to therapy.  10.  Proceed with maintenance Opdivo every 4 weeks as of 6/27/2019.  11. Opdivo discontinued secondary to poor tolerance 7/25/2019.  12.  Patient seen on August 6, 2019 for persistent symptoms.  Prednisone restarted at 10 mg daily.     HISTORY OF PRESENT ILLNESS:  Gelacio Cunningham is a 64 y.o. male with the above-mentioned history here today for scheduled follow-up after restarting his prednisone at 10 mg daily on August 6, 2019.  He was seen through triage with reports of persistent symptoms including abdominal pain, shortness of breath and overwhelming fatigue.  I did instruct him to restart his prednisone and thankfully his symptoms have improved greatly.  Does continue to experience  mild abdominal pain at night but the severe ongoing pain has resolved.  His shortness of breath has also improved.  His energy level and appetite are better.    His vital signs are stable.  He denies new symptoms such as fever, chills, swelling, bleeding or bruising.  He denies neurologic symptoms.    ONCOLOGIC HISTORY:  The patient has a past medical history significant for hypertension and rheumatoid arthritis.  For his rheumatoid arthritis he has been treated with a number of agents in the past including methotrexate, Enbrel, Humira, and more recently rituximab which was started around 1-1/2 years ago with his most recent treatment administered around one month ago.  He has some chronic diffuse arthralgias which are reasonably well-controlled on rituximab.  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  This was diagnosed initially by his dermatologist Dr. Aggarwal.  He was referred to Dr. Vega and underwent surgery at [facility] in late 2017 with wide resection of the lesion and sentinel lymph node biopsy.  The patient reports that the margin was positive from the resection however lymph nodes were negative.  He had a second surgery with negative margins.  He did not receive any adjuvant therapy.     Recently, the patient developed symptoms around 2 weeks ago with mild nausea and loss of appetite.  He then developed around 1 week ago right-sided abdominal pain.  He presented to his primary care physician and underwent a right upper quadrant ultrasound on 1/24/19 showing multiple liver masses the largest of which measured 7.7 cm in the right hepatic lobe.  He was admitted and underwent CT of the abdomen and pelvis on 1/24/19 which confirmed multiple hepatic masses worrisome for metastatic disease with the largest lesion in the right lobe measuring 8.3 cm.  There was a 1 cm sclerotic nodule in the left sacral ala which was possibly related to a bone island.  There was a  subtle area of narrowing around the mid transverse colon which was felt to possibly be related to wall thickening.  General surgery has been consulted and there are plans to pursue colonoscopy tomorrow.  He has undergone prior colonoscopy 12/23/16 with a single right-sided polyp removed.  He has now undergone CT chest 1/25/19 which showed 3 small pulmonary nodules in the left upper lobe measuring up to 9 mm which are indeterminate and a small 1 cm nodule in the left lower lobe which may be focal atelectasis.  There were some trace bilateral pleural effusions.  The patient did undergo CT-guided liver biopsy 1/25/19 with pathology non-diagnostic.    He was discharged from the hospital.  He subsequently had a PET scan on 2/1/2019 which allowed us to target a different liver lesion that was not necrotic.  He did have a liver biopsy on 2/8/2019 with results consistent with metastatic melanoma.  The biopsy also showed evidence for acute cholangitis, increased portal fibrosis with mixed inflammation with lymphocytes and eosinophils as well as sinusoidal dilatation consistent with cardiac etiology.    A brain MRI on 2/25/2019 showed 2 brain lesions consistent with metastatic disease.  The largest is in the left frontal lobe measuring 3.8 cm with some evidence for hemorrhage and some vasogenic edema.  There is a smaller 5-6 mm lesion in the right parietal lobe.  He was seen by Dr. Rosado with plans for stereotactic radiosurgery with planning on 3/8/2019.    He completed radiation.    He completed 3 cycles of Opdivo and Yervoy complicated by hepatitis and extensive drug rash.  These resolved easily with high-dose prednisone.    MRI of the brain on 5/22/2019 shows a good response to radiation.    PET scan on 6/24/2019 shows essentially complete response to therapy.    As of 6/27/2019 proceed with maintenance Opdivo every 4 weeks.    ALLERGIES:  Allergies   Allergen Reactions   • Sulindac Diarrhea       MEDICATIONS:  The  medication list has been reviewed with the patient by the medical assistant, and the list has been updated in the electronic medical record, which I reviewed.  Medication dosages and frequencies were confirmed to be accurate.    I have reviewed the patient's medical history in detail and updated the computerized patient record.    Review of Systems   Constitutional: Positive for appetite change (improved) and fatigue (improved). Negative for chills, diaphoresis, fever and unexpected weight change.   HENT:   Negative for trouble swallowing.    Respiratory: Negative for cough and shortness of breath.    Cardiovascular: Negative for chest pain.   Gastrointestinal: Positive for abdominal pain (improved). Negative for blood in stool, constipation, diarrhea and nausea.   Musculoskeletal: Positive for arthralgias (improved) and myalgias (improved).   Skin: Positive for itching.   Neurological: Negative for dizziness and extremity weakness.   Hematological: Does not bruise/bleed easily.   Psychiatric/Behavioral: Positive for sleep disturbance (improved).   Review of systems unchanged from previous office visit except as updated      There were no vitals filed for this visit.    PHYSICAL EXAMINATION:  GENERAL:  Well-developed well-nourished male; awake, alert and oriented, in no acute distress. He is accompanied by his wife.   SKIN: Warm and dry without rash  HEAD:  Normocephalic, atraumatic.  EYES:  Pupils equal, round and reactive to light.  Extraocular movements intact.  Conjunctivae normal.  EARS:  Hearing intact.  NOSE:  Septum midline.  No excoriations or nasal discharge.  CHEST: Regular respiratory effort.  HEART:  Regular rate; normal rhythm.  No murmurs, gallops or rubs.  Abdomen: Lightly distended, normal active bowel sounds..  EXTREMITIES:  No clubbing, cyanosis, or edema. 1+ bilateral edema , stable  NEUROLOGICAL:  No focal neurologic deficits.      DIAGNOSTIC DATA:  Results from last 7 days   Lab Units  08/22/19  1253   WBC 10*3/mm3 7.39   NEUTROS ABS 10*3/mm3 5.10   HEMOGLOBIN g/dL 12.7*   HEMATOCRIT % 39.6   PLATELETS 10*3/mm3 228               Imaging: MRI brain images from 5/22/2019.     IMPRESSION:  There is a late subacute to chronic hematoma within the  anterior portion of the left frontal lobe which is compatible with a  hemorrhagic melanoma metastasis. The lesion has diminished in size from  approximately 3.8 x 2.3 x 3.3 cm in greatest dimensions to 3.1 x 1.6 x  2.1 cm. Also, the surrounding vasogenic edema at the site has resolved.  The previously noted additional focus of T1 shortening compatible with  hemorrhage within an additional metastatic lesion within the medial  portion of the right parietal lobe is no longer identified. No new areas  of metastatic disease are noted.    PET scan on 6/24/2019 consistent with essentially a complete response to therapy.  Images personally reviewed.    IMPRESSION:  Marked interval improvement. There has been resolution of  the hypermetabolic activity at the previously seen liver, bone, and  pulmonary metastases. The liver has a more typical speckled pattern of  activity with no definite suspicious foci. Tiny residual liver  metastases can't be entirely excluded. There are currently no definite  hypermetabolic metastases.      ASSESSMENT:  This is a 64 y.o. male with:  1. Metastatic melanoma:  · Patient with recent onset of symptoms including nausea, anorexia, right-sided abdominal pain over the 2 weeks preceeding admission in January 2019  · Abnormal right upper quadrant ultrasound showing multiple liver masses 1/24/19.  · CT abdomen and pelvis 1/24/19 confirmed multiple hepatic masses worrisome for metastatic disease with the largest lesion in the right lobe measuring 8.3 cm.  There was a 1 cm sclerotic nodule in the left sacral ala which was possibly related to a bone island.  There was a subtle area of narrowing around the mid transverse colon which was felt to  possibly be related to wall thickening.  · CT chest 1/25/19 showed 3 small pulmonary nodules in the left upper lobe measuring up to 9 mm which are indeterminate and a small 1 cm nodule in the left lower lobe which may be focal atelectasis.  There were some trace bilateral pleural effusions.   · CT-guided liver biopsy 1/25/19 with pathology only showing blood.   · Colonoscopy by Dr. Rashid on 1/27 negative.  CEA 1/25/19 was normal at 1.4.   · Given the patient's history of melanoma in late 2017, there was concern regarding possible metastatic melanoma as well.  · Colonoscopy 1/27/2019 negative for malignancy.  · Discussed with Dr. Richardson with radiology.  Outpatient PET with repeat liver biopsy based on these results  · PET performed 2/1/2019  · Repeat liver biopsy on 2/8/2019 confirms metastatic melanoma with evidence also for sinusoidal dilatation consistent with cardiac etiology, increased portal fibrosis with mixed inflammation with lymphocytes and eosinophils, and acute cholangitis.  · BRAF/NRAS mutation negative  · Brain MRI on 2/25/2019 with 2 metastatic lesions.  One measures 3.8 cm in the left frontal lobe and the other measures 5-6 mm in the right parietal lobe.   · Stereotactic radiosurgery to be completed this Friday, March 22, 2019.    · Immunotherapy initiated 3/20/2019 with Opdivo Yervoy  · Rash following cycle #1.  2 rashes present one on his trunk and one on the left arm which appeared different and more vesicular consistent with zoster.  He was treated with a Medrol Dosepak and Valtrex with prompt resolution of his symptoms.  · Cycle 2 initiated on 4/10/2019.  Plan 4 cycles followed by imaging followed by maintenance Opdivo assuming a good response.  · Fever and night sweats following cycle #2 of therapy.  Cultures negative.  These have dissipated prior to cycle #3.  · Cycle #3 of therapy held due to acute hepatitis.  · Treated with steroids.  Prednisone finished 5/28/2019.  Liver labs had been  improving.  · 3 cycles of therapy ultimately completed.  Complicated by significant rash.  Improved with steroids.  · PET scan 6/24/2019 with essentially complete response to therapy.  · As of 6/27/2019, proceed with maintenance Opdivo every 4 weeks.  · July 25, 2019 for scheduled Opdivo.  His performance status continues to decline with each treatment.  · He is seen August 6 2019  with reports of persistent fatigue, abdominal pain, decreased appetite and nausea.  · Symptoms significantly improved with addition of prednisone 10 mg    2. Rheumatoid arthritis:  · The patient has long-standing disease treated in the past with methotrexate, Enbrel, Humira, and more recently rituximab which was started around 1-1/2 years ago with his most recent treatment administered around late December 2018.    · He has chronic diffuse arthralgias which are reasonably well-controlled on rituximab.  · As above, as metastatic melanoma is identified, this issue may complicate use of immunotherapy.  · Currently asymptomatic from the RA.  Monitor closely.  · He was started on Plaquenil approximately 1 month ago which is moderately managing his diffuse joint pain.  3. History of cutaneous melanoma of the right ear:  · Diagnosed initially by his dermatologist Dr. Aggarwal, appears to have been in the pre-auricular region.  Pathology reviewed from initial biopsy showing Breslow 0.82 mm, non-ulcerated lesion with low mitotic rate.  · He was referred to Dr. Vega and underwent surgery at [facility] in December 2017 with wide resection of the lesion and sentinel lymph node biopsy.  We do not yet have that pathology report however per notes available, there was a residual area of melanoma and sentinel nodes were negative.  He therefore underwent a second procedure for wide excision with presumably negative margins.     · He did not receive any adjuvant therapy.    4. Rash secondary to immunotherapy.  · 1 week following the  initiation of Yervoy and Opdivo, the patient has developed a pruritic diffuse rash on his trunk, chest and back.  Unresponsive to OTC topical steroids.  · Treated with a medrol dose joel with prompt resolution  · Treated with high-dose steroids then after cycle #3 of therapy with improvement.  · Resolved.    5. Suspected varicella-zoster rash of the left upper inner arm.  · Along with the development of immune mediated dermatitis, the patient also developed suspected zoster of his left upper inner arm.  This appeared very different in appearance from his immune mediated drug rash with linear vesicles.   · He was treated with Valtrex 1 g 3 times daily times 1 week with prompt resolution.  Unclear if this was really different than the other rash or not with prompt resolution and no pain involved.    6.  Pyrexia and diaphoresis: Likely secondary to immunotherapy.  Cultures were negative.  Not currently an issue.    7.  Brain metastases: Status post stereotactic radiosurgery.  Repeat MRI of the brain on 5/22/2019 with improvement.  No neurologic symptoms.    8.  Acute hepatitis related to Opdivo and Yervoy.  Viral hepatitis studies negative.  Treated with steroids.    10.  Acute onset of abdominal discomfort that has progressed with recent treatments.  He was evaluated with CT scan last visit which revealed no acute abnormality.    11. Insomnia.  Improved with Restoril.    PLAN:  1.  Mr. Cunningham will continue prednisone 10 mg daily.  He will undergo CAT scan in early October with plans to review with Dr. Hummel as scheduled on October 21, 2019.  I have asked him to call the office before the scheduled visit with any new or worsening symptoms.    2.  He will continue Restoril as prescribed.      GUSTABO May

## 2019-09-10 RX ORDER — PREDNISONE 10 MG/1
10 TABLET ORAL DAILY
Qty: 30 TABLET | Refills: 2 | Status: SHIPPED | OUTPATIENT
Start: 2019-09-10 | End: 2019-11-08

## 2019-09-10 NOTE — TELEPHONE ENCOUNTER
Call rec from pts Wife-she states [facility] is needing a new rx for pts Prednisone. Per last office notes-pt is to take 10 mg daily. I have sent a new rx to [facility].

## 2019-10-15 ENCOUNTER — HOSPITAL ENCOUNTER (OUTPATIENT)
Dept: PET IMAGING | Facility: HOSPITAL | Age: 64
Discharge: HOME | End: 2019-10-15
Admitting: NURSE PRACTITIONER

## 2019-10-15 DIAGNOSIS — C78.7 METASTATIC MELANOMA TO LIVER (HCC): ICD-10-CM

## 2019-10-15 LAB — CREAT BLDA-MCNC: 1 MG/DL (ref 0.6–1.3)

## 2019-10-15 PROCEDURE — 71260 CT THORAX DX C+: CPT

## 2019-10-15 PROCEDURE — 74177 CT ABD & PELVIS W/CONTRAST: CPT

## 2019-10-15 PROCEDURE — 0 DIATRIZOATE MEGLUMINE & SODIUM PER 1 ML: Performed by: NURSE PRACTITIONER

## 2019-10-15 PROCEDURE — 25010000002 IOPAMIDOL 61 % SOLUTION: Performed by: NURSE PRACTITIONER

## 2019-10-15 PROCEDURE — 82565 ASSAY OF CREATININE: CPT

## 2019-10-15 RX ADMIN — DIATRIZOATE MEGLUMINE AND DIATRIZOATE SODIUM 30 ML: 660; 100 LIQUID ORAL; RECTAL at 08:30

## 2019-10-15 RX ADMIN — IOPAMIDOL 85 ML: 612 INJECTION, SOLUTION INTRAVENOUS at 09:25

## 2019-10-21 ENCOUNTER — OFFICE VISIT (OUTPATIENT)
Dept: ONCOLOGY | Facility: CLINIC | Age: 64
End: 2019-10-21

## 2019-10-21 ENCOUNTER — LAB (OUTPATIENT)
Dept: LAB | Facility: HOSPITAL | Age: 64
End: 2019-10-21

## 2019-10-21 VITALS
WEIGHT: 184.4 LBS | DIASTOLIC BLOOD PRESSURE: 82 MMHG | SYSTOLIC BLOOD PRESSURE: 133 MMHG | TEMPERATURE: 98.1 F | BODY MASS INDEX: 29.63 KG/M2 | RESPIRATION RATE: 16 BRPM | OXYGEN SATURATION: 96 % | HEART RATE: 65 BPM | HEIGHT: 66 IN

## 2019-10-21 DIAGNOSIS — C78.7 METASTATIC MELANOMA TO LIVER (HCC): Primary | ICD-10-CM

## 2019-10-21 DIAGNOSIS — C80.1: ICD-10-CM

## 2019-10-21 DIAGNOSIS — C79.31 BRAIN METASTASES: ICD-10-CM

## 2019-10-21 LAB
ALBUMIN SERPL-MCNC: 3.8 G/DL (ref 3.5–5.2)
ALBUMIN/GLOB SERPL: 1.3 G/DL (ref 1.1–2.4)
ALP SERPL-CCNC: 72 U/L (ref 38–116)
ALT SERPL W P-5'-P-CCNC: 29 U/L (ref 0–41)
ANION GAP SERPL CALCULATED.3IONS-SCNC: 10.6 MMOL/L (ref 5–15)
AST SERPL-CCNC: 28 U/L (ref 0–40)
BASOPHILS # BLD AUTO: 0.06 10*3/MM3 (ref 0–0.2)
BASOPHILS NFR BLD AUTO: 0.8 % (ref 0–1.5)
BILIRUB SERPL-MCNC: 0.4 MG/DL (ref 0.2–1.2)
BUN BLD-MCNC: 13 MG/DL (ref 6–20)
BUN/CREAT SERPL: 12 (ref 7.3–30)
CALCIUM SPEC-SCNC: 9.3 MG/DL (ref 8.5–10.2)
CHLORIDE SERPL-SCNC: 100 MMOL/L (ref 98–107)
CO2 SERPL-SCNC: 27.4 MMOL/L (ref 22–29)
CREAT BLD-MCNC: 1.08 MG/DL (ref 0.7–1.3)
DEPRECATED RDW RBC AUTO: 45.7 FL (ref 37–54)
EOSINOPHIL # BLD AUTO: 0.27 10*3/MM3 (ref 0–0.4)
EOSINOPHIL NFR BLD AUTO: 3.7 % (ref 0.3–6.2)
ERYTHROCYTE [DISTWIDTH] IN BLOOD BY AUTOMATED COUNT: 13.7 % (ref 12.3–15.4)
GFR SERPL CREATININE-BSD FRML MDRD: 69 ML/MIN/1.73
GLOBULIN UR ELPH-MCNC: 2.9 GM/DL (ref 1.8–3.5)
GLUCOSE BLD-MCNC: 89 MG/DL (ref 74–124)
HCT VFR BLD AUTO: 41 % (ref 37.5–51)
HGB BLD-MCNC: 13.4 G/DL (ref 13–17.7)
IMM GRANULOCYTES # BLD AUTO: 0.09 10*3/MM3 (ref 0–0.05)
IMM GRANULOCYTES NFR BLD AUTO: 1.2 % (ref 0–0.5)
LYMPHOCYTES # BLD AUTO: 1.51 10*3/MM3 (ref 0.7–3.1)
LYMPHOCYTES NFR BLD AUTO: 20.9 % (ref 19.6–45.3)
MCH RBC QN AUTO: 29.5 PG (ref 26.6–33)
MCHC RBC AUTO-ENTMCNC: 32.7 G/DL (ref 31.5–35.7)
MCV RBC AUTO: 90.3 FL (ref 79–97)
MONOCYTES # BLD AUTO: 0.87 10*3/MM3 (ref 0.1–0.9)
MONOCYTES NFR BLD AUTO: 12 % (ref 5–12)
NEUTROPHILS # BLD AUTO: 4.44 10*3/MM3 (ref 1.7–7)
NEUTROPHILS NFR BLD AUTO: 61.4 % (ref 42.7–76)
NRBC BLD AUTO-RTO: 0 /100 WBC (ref 0–0.2)
PLATELET # BLD AUTO: 207 10*3/MM3 (ref 140–450)
PMV BLD AUTO: 9 FL (ref 6–12)
POTASSIUM BLD-SCNC: 4.1 MMOL/L (ref 3.5–4.7)
PROT SERPL-MCNC: 6.7 G/DL (ref 6.3–8)
RBC # BLD AUTO: 4.54 10*6/MM3 (ref 4.14–5.8)
SODIUM BLD-SCNC: 138 MMOL/L (ref 134–145)
WBC NRBC COR # BLD: 7.24 10*3/MM3 (ref 3.4–10.8)

## 2019-10-21 PROCEDURE — 80053 COMPREHEN METABOLIC PANEL: CPT

## 2019-10-21 PROCEDURE — 85025 COMPLETE CBC W/AUTO DIFF WBC: CPT

## 2019-10-21 PROCEDURE — 99215 OFFICE O/P EST HI 40 MIN: CPT | Performed by: INTERNAL MEDICINE

## 2019-10-21 PROCEDURE — G0463 HOSPITAL OUTPT CLINIC VISIT: HCPCS | Performed by: INTERNAL MEDICINE

## 2019-10-21 PROCEDURE — 36415 COLL VENOUS BLD VENIPUNCTURE: CPT

## 2019-10-21 RX ORDER — SULFASALAZINE 500 MG/1
1000 TABLET ORAL
COMMUNITY
Start: 2019-09-09 | End: 2019-11-08

## 2019-10-21 RX ORDER — GABAPENTIN 300 MG/1
300 CAPSULE ORAL DAILY
Qty: 90 CAPSULE | Refills: 0 | Status: SHIPPED | OUTPATIENT
Start: 2019-10-21 | End: 2019-11-20 | Stop reason: SDUPTHER

## 2019-10-21 NOTE — PROGRESS NOTES
[facility] GROUP OUTPATIENT FOLLOW UP CLINIC VISIT    REASON FOR FOLLOW-UP:    1.  Metastatic BRAF/NRAS mutation negative melanoma with metastatic disease present in the lung, liver bone, and brain at diagnosis.  2.  History of rheumatoid arthritis on therapy with Rituxan  3.  Right upper quadrant abdominal pain secondary to metastatic melanoma  4.  He was seen by Dr. Islas at [facility] with recommendations for ipi/nivo following stereotactic radiosurgery for his brain  5.  He was seen by Dr. Rosado with radiation oncology with plans for stereotactic radiosurgery to his 2 brain lesions with treatment planning initiated on 3/8/2019.  Stereotactic treatment completed from 3/13/2019 through 3/22/2019.  1 fraction at 2400 cGy administered to the right parietal lesion in 5 fractions to 3000 cGy administered to the left frontal lesion.  6. Opdivo Yervoy initiated on 3/20/2019.  7.  He was seen on 3/28/2019 with 2 rashes one consistent with a drug rash and the other consistent with herpes zoster.  8.  Acute hepatitis secondary to immunotherapy after cycle #2.  Treated with steroids.  9.  He completed 3 cycles of combination therapy.  A PET scan on 6/24/2019 shows essentially complete response to therapy.  10.  Proceed with maintenance Opdivo every 4 weeks as of 6/27/2019.  11. Opdivo discontinued secondary to poor tolerance 7/25/2019.  12.  Patient seen on August 6, 2019 for persistent symptoms.  Prednisone restarted at 10 mg daily.     HISTORY OF PRESENT ILLNESS:  Gelacio Cunningham is a 64 y.o. male with the above-mentioned history here today for scheduled follow-up to review imaging.    Energy level is generally good.  He does complain of tinnitus that started a couple of weeks ago with no apparent reason.  He also notes worsening rheumatoid arthritis pain in his shoulders and hands.  He also is having some right flank discomfort similar to the discomfort that he had at the time of his  melanoma diagnosis.  No monico pain.  His appetite is adequate.  No nausea or vomiting.  He is no longer taking prednisone on a scheduled basis but does take it as needed for rheumatoid arthritis pain.    ONCOLOGIC HISTORY:  The patient has a past medical history significant for hypertension and rheumatoid arthritis.  For his rheumatoid arthritis he has been treated with a number of agents in the past including methotrexate, Enbrel, Humira, and more recently rituximab which was started around 1-1/2 years ago with his most recent treatment administered around one month ago.  He has some chronic diffuse arthralgias which are reasonably well-controlled on rituximab.  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  This was diagnosed initially by his dermatologist Dr. Aggarwal.  He was referred to Dr. Vega and underwent surgery at [facility] in late 2017 with wide resection of the lesion and sentinel lymph node biopsy.  The patient reports that the margin was positive from the resection however lymph nodes were negative.  He had a second surgery with negative margins.  He did not receive any adjuvant therapy.     Recently, the patient developed symptoms around 2 weeks ago with mild nausea and loss of appetite.  He then developed around 1 week ago right-sided abdominal pain.  He presented to his primary care physician and underwent a right upper quadrant ultrasound on 1/24/19 showing multiple liver masses the largest of which measured 7.7 cm in the right hepatic lobe.  He was admitted and underwent CT of the abdomen and pelvis on 1/24/19 which confirmed multiple hepatic masses worrisome for metastatic disease with the largest lesion in the right lobe measuring 8.3 cm.  There was a 1 cm sclerotic nodule in the left sacral ala which was possibly related to a bone island.  There was a subtle area of narrowing around the mid transverse colon which was felt to possibly be related to wall  thickening.  General surgery has been consulted and there are plans to pursue colonoscopy tomorrow.  He has undergone prior colonoscopy 12/23/16 with a single right-sided polyp removed.  He has now undergone CT chest 1/25/19 which showed 3 small pulmonary nodules in the left upper lobe measuring up to 9 mm which are indeterminate and a small 1 cm nodule in the left lower lobe which may be focal atelectasis.  There were some trace bilateral pleural effusions.  The patient did undergo CT-guided liver biopsy 1/25/19 with pathology non-diagnostic.    He was discharged from the hospital.  He subsequently had a PET scan on 2/1/2019 which allowed us to target a different liver lesion that was not necrotic.  He did have a liver biopsy on 2/8/2019 with results consistent with metastatic melanoma.  The biopsy also showed evidence for acute cholangitis, increased portal fibrosis with mixed inflammation with lymphocytes and eosinophils as well as sinusoidal dilatation consistent with cardiac etiology.    A brain MRI on 2/25/2019 showed 2 brain lesions consistent with metastatic disease.  The largest is in the left frontal lobe measuring 3.8 cm with some evidence for hemorrhage and some vasogenic edema.  There is a smaller 5-6 mm lesion in the right parietal lobe.  He was seen by Dr. Rosado with plans for stereotactic radiosurgery with planning on 3/8/2019.    He completed radiation.    He completed 3 cycles of Opdivo and Yervoy complicated by hepatitis and extensive drug rash.  These resolved easily with high-dose prednisone.    MRI of the brain on 5/22/2019 shows a good response to radiation.    PET scan on 6/24/2019 shows essentially complete response to therapy.    As of 6/27/2019 proceed with maintenance Opdivo every 4 weeks.  His last dose was on that day due to toxicity.        ALLERGIES:  Allergies   Allergen Reactions   • Sulindac Diarrhea       MEDICATIONS:  The medication list has been reviewed with the patient by the  medical assistant, and the list has been updated in the electronic medical record, which I reviewed.  Medication dosages and frequencies were confirmed to be accurate.    I have reviewed the patient's medical history in detail and updated the computerized patient record.    Review of Systems   Constitutional: Positive for fatigue (improved). Negative for appetite change, chills, diaphoresis, fever and unexpected weight change.   HENT:   Negative for trouble swallowing.    Respiratory: Negative for cough and shortness of breath.    Cardiovascular: Negative for chest pain.   Gastrointestinal: Positive for abdominal pain (improved). Negative for blood in stool, constipation, diarrhea and nausea.   Musculoskeletal: Positive for arthralgias (improved) and myalgias (improved).   Skin: Positive for itching (Improved).   Neurological: Negative for dizziness and extremity weakness.   Hematological: Does not bruise/bleed easily.   Psychiatric/Behavioral: Positive for sleep disturbance (improved).   Review of systems unchanged from previous office visit except as updated      There were no vitals filed for this visit.    PHYSICAL EXAMINATION:  GENERAL:  Well-developed well-nourished male; awake, alert and oriented, in no acute distress. He is accompanied by his wife.   SKIN: Warm and dry without rash  HEAD:  Normocephalic, atraumatic.  EYES:  Pupils equal, round and reactive to light.  Extraocular movements intact.  Conjunctivae normal.  EARS:  Hearing intact.  NOSE:  Septum midline.  No excoriations or nasal discharge.  CHEST: Regular respiratory effort.  HEART:  Regular rate; normal rhythm.  No murmurs, gallops or rubs.  Abdomen: Soft, nontender, nondistended, normal active bowel sounds, no hepatospleno megaly  EXTREMITIES:  No clubbing, cyanosis, or edema.  NEUROLOGICAL:  No focal neurologic deficits.      DIAGNOSTIC DATA:      Results from last 7 days   Lab Units 10/15/19  0907   CREATININE mg/dL 1.00           IMAGING:      CT images of the chest abdomen and pelvis from 10/15/2019 images personally reviewed.  Ongoing improvement.    IMPRESSION:  On CT there has been further interval decrease of the left  upper lobe nodule that now measures 6 mm and decrease in size of the  largest hepatic mass. Additional hepatic lesions are without interim  change from prior. A lytic lesion within the T8 vertebral body now  demonstrates a sclerotic margin. Additional findings as above. No new  convincing evidence of disease.      ASSESSMENT:  This is a 64 y.o. male with:  1. Metastatic melanoma:  · Patient with recent onset of symptoms including nausea, anorexia, right-sided abdominal pain over the 2 weeks preceeding admission in January 2019  · Abnormal right upper quadrant ultrasound showing multiple liver masses 1/24/19.  · CT abdomen and pelvis 1/24/19 confirmed multiple hepatic masses worrisome for metastatic disease with the largest lesion in the right lobe measuring 8.3 cm.  There was a 1 cm sclerotic nodule in the left sacral ala which was possibly related to a bone island.  There was a subtle area of narrowing around the mid transverse colon which was felt to possibly be related to wall thickening.  · CT chest 1/25/19 showed 3 small pulmonary nodules in the left upper lobe measuring up to 9 mm which are indeterminate and a small 1 cm nodule in the left lower lobe which may be focal atelectasis.  There were some trace bilateral pleural effusions.   · CT-guided liver biopsy 1/25/19 with pathology only showing blood.   · Colonoscopy by Dr. Rashid on 1/27 negative.  CEA 1/25/19 was normal at 1.4.   · Given the patient's history of melanoma in late 2017, there was concern regarding possible metastatic melanoma as well.  · Colonoscopy 1/27/2019 negative for malignancy.  · Discussed with Dr. Richardson with radiology.  Outpatient PET with repeat liver biopsy based on these results  · PET performed 2/1/2019  · Repeat liver biopsy on 2/8/2019  confirms metastatic melanoma with evidence also for sinusoidal dilatation consistent with cardiac etiology, increased portal fibrosis with mixed inflammation with lymphocytes and eosinophils, and acute cholangitis.  · BRAF/NRAS mutation negative  · Brain MRI on 2/25/2019 with 2 metastatic lesions.  One measures 3.8 cm in the left frontal lobe and the other measures 5-6 mm in the right parietal lobe.   · Stereotactic radiosurgery to be completed this Friday, March 22, 2019.    · Immunotherapy initiated 3/20/2019 with Opdivo Yervoy  · Rash following cycle #1.  2 rashes present one on his trunk and one on the left arm which appeared different and more vesicular consistent with zoster.  He was treated with a Medrol Dosepak and Valtrex with prompt resolution of his symptoms.  · Cycle 2 initiated on 4/10/2019.  Plan 4 cycles followed by imaging followed by maintenance Opdivo assuming a good response.  · Fever and night sweats following cycle #2 of therapy.  Cultures negative.  These have dissipated prior to cycle #3.  · Cycle #3 of therapy held due to acute hepatitis.  · Treated with steroids.  Prednisone finished 5/28/2019.  Liver labs had been improving.  · 3 cycles of therapy ultimately completed.  Complicated by significant rash.  Improved with steroids.  · PET scan 6/24/2019 with essentially complete response to therapy.  · As of 6/27/2019, proceed with maintenance Opdivo every 4 weeks.  · July 25, 2019 for scheduled Opdivo.  His performance status continues to decline with each treatment.  · He is seen August 6 2019  with reports of persistent fatigue, abdominal pain, decreased appetite and nausea.  · Symptoms significantly improved with addition of prednisone 10 mg  · Final Opdivo administered on 6/27/2019.  · CT imaging on 10/15/2019 without evidence for progression    2. Rheumatoid arthritis:  · The patient has long-standing disease treated in the past with methotrexate, Enbrel, Humira, and more recently  rituximab which was started around 1-1/2 years ago with his most recent treatment administered around late December 2018.    · He has chronic diffuse arthralgias which are reasonably well-controlled on rituximab.  · As above, as metastatic melanoma is identified, this issue may complicate use of immunotherapy.  · Symptoms are worsening.  He is on Plaquenil and sulfasalazine    3. History of cutaneous melanoma of the right ear:  · Diagnosed initially by his dermatologist Dr. Aggarwal, appears to have been in the pre-auricular region.  Pathology reviewed from initial biopsy showing Breslow 0.82 mm, non-ulcerated lesion with low mitotic rate.  · He was referred to Dr. Vega and underwent surgery at [facility] in December 2017 with wide resection of the lesion and sentinel lymph node biopsy.  We do not yet have that pathology report however per notes available, there was a residual area of melanoma and sentinel nodes were negative.  He therefore underwent a second procedure for wide excision with presumably negative margins.     · He did not receive any adjuvant therapy.    4. Rash secondary to immunotherapy.  · 1 week following the initiation of Yervoy and Opdivo, the patient has developed a pruritic diffuse rash on his trunk, chest and back.  Unresponsive to OTC topical steroids.  · Treated with a medrol dose joel with prompt resolution  · Treated with high-dose steroids then after cycle #3 of therapy with improvement.  · Resolved.    5. Suspected varicella-zoster rash of the left upper inner arm.  · Along with the development of immune mediated dermatitis, the patient also developed suspected zoster of his left upper inner arm.  This appeared very different in appearance from his immune mediated drug rash with linear vesicles.   · He was treated with Valtrex 1 g 3 times daily times 1 week with prompt resolution.  Unclear if this was really different than the other rash or not with prompt resolution and  no pain involved.    6.  Pyrexia and diaphoresis: Likely secondary to immunotherapy.  Cultures were negative.  Not currently an issue.    7.  Brain metastases: Status post stereotactic radiosurgery.  Repeat MRI of the brain on 5/22/2019 with improvement.  No neurologic symptoms.    8.  Acute hepatitis related to Opdivo and Yervoy.  Viral hepatitis studies negative.  Treated with steroids with resolution.    10.  Acute onset of abdominal discomfort that progressed with Opdivo treatments.  He was evaluated with CT scan last visit which revealed no acute abnormality.    11. Insomnia.  Improved with Restoril.    12.  Tinnitus: Unclear etiology.  If it persists, MRI imaging of the brain sooner rather than later.  No other neurologic symptoms.    PLAN:  1.  No further treatment for melanoma at this time given stable/improved imaging and adverse effects from therapy  2.  He will continue prednisone only as needed at this point  3.  Whole-body PET scan and MRI of the brain in 3 months for follow-up with a CBC, CMP, LDH that day.  4.  I will see him back after that to reassess.      Albin Hummel MD

## 2019-10-29 ENCOUNTER — TELEPHONE (OUTPATIENT)
Dept: ONCOLOGY | Facility: HOSPITAL | Age: 64
End: 2019-10-29

## 2019-10-29 DIAGNOSIS — H93.13 RINGING IN EAR, BILATERAL: ICD-10-CM

## 2019-10-29 DIAGNOSIS — C78.7 METASTATIC MELANOMA TO LIVER (HCC): Primary | ICD-10-CM

## 2019-10-29 NOTE — TELEPHONE ENCOUNTER
Notified pts wife and msg sent to oncology nursing and scheduling desk to schedule and set up;     ----- Message from Albin Hummel MD sent at 10/29/2019 10:18 AM EDT -----  Yes, please order an MRI brain with and without contrast please.  [facility]      ----- Message -----  From: Vinita Hansen RN  Sent: 10/29/2019   9:45 AM  To: Albin Hummel MD    Patients wife called to let you know that the patients having worsening ringing in his ears and it is driving him crazy. Your note mentioned doing MRI sooner? Please let me know what to do for patient. Thanks!

## 2019-10-29 NOTE — TELEPHONE ENCOUNTER
Pt is having worsening ringing in his ears. Really frustrating him. Per  last dictation, may do MRI sooner if this happening. msg sent to  to get orders on patient.     ----- Message from Amrita Beaver sent at 10/29/2019  9:31 AM EDT -----  [phone]   His wife said he has been having issues with ringing in his ears and has gotten worse. Dr. Hummel said to call him if it worsened

## 2019-10-29 NOTE — TELEPHONE ENCOUNTER
msg sent to scheduling and oncology nurses to change priority    ----- Message from Albin Hummel MD sent at 10/29/2019 11:00 AM EDT -----  Either STAT or move it to [place] if they can do it sooner.  [facility]    ----- Message -----  From: Vinita Hnasen RN  Sent: 10/29/2019  10:45 AM  To: Albin Hummel MD     Is it ok to wait until next Friday or do you want us to change the priority?  ----- Message -----  From: Ivory García  Sent: 10/29/2019  10:43 AM  To: Vinita Hansen RN    The first jillian MRI is next Friday 11/8, if it needs to be sooner the order will have to changed to STAT. Please let me know, thanks ivory  ----- Message -----  From: Vinita Hansen RN  Sent: 10/29/2019  10:25 AM  To: Oncology Nurses, #    Can someone order this for me? And scheduling this can be done in the next week please. Thanks   ----- Message -----  From: Albin Hummel MD  Sent: 10/29/2019  10:18 AM  To: Vinita Hansen RN    Yes, please order an MRI brain with and without contrast please.  [facility]      ----- Message -----  From: Vinita Hansen RN  Sent: 10/29/2019   9:45 AM  To: Albin Hummel MD    Patients wife called to let you know that the patients having worsening ringing in his ears and it is driving him crazy. Your note mentioned doing MRI sooner? Please let me know what to do for patient. Thanks!

## 2019-10-30 ENCOUNTER — HOSPITAL ENCOUNTER (OUTPATIENT)
Dept: MRI IMAGING | Facility: HOSPITAL | Age: 64
Discharge: HOME | End: 2019-10-30
Admitting: INTERNAL MEDICINE

## 2019-10-30 DIAGNOSIS — C78.7 METASTATIC MELANOMA TO LIVER (HCC): ICD-10-CM

## 2019-10-30 DIAGNOSIS — H93.13 RINGING IN EAR, BILATERAL: ICD-10-CM

## 2019-10-30 PROCEDURE — A9577 INJ MULTIHANCE: HCPCS | Performed by: INTERNAL MEDICINE

## 2019-10-30 PROCEDURE — 82565 ASSAY OF CREATININE: CPT

## 2019-10-30 PROCEDURE — 70553 MRI BRAIN STEM W/O & W/DYE: CPT

## 2019-10-30 PROCEDURE — 0 GADOBENATE DIMEGLUMINE 529 MG/ML SOLUTION: Performed by: INTERNAL MEDICINE

## 2019-10-30 RX ADMIN — GADOBENATE DIMEGLUMINE 16 ML: 529 INJECTION, SOLUTION INTRAVENOUS at 13:24

## 2019-10-31 LAB — CREAT BLDA-MCNC: 1 MG/DL (ref 0.6–1.3)

## 2019-11-01 ENCOUNTER — TELEPHONE (OUTPATIENT)
Dept: ONCOLOGY | Facility: CLINIC | Age: 64
End: 2019-11-01

## 2019-11-01 NOTE — TELEPHONE ENCOUNTER
MRI brain reviewed.  Improving small area of hemorrhage.  Nothing to suggest an etiology of the tinnitus.    I spoke with the patient on the phone.  He states that the tinnitus started after he started sulfasalazine.  This is listed as a potential adverse effect that less than 1% frequency.  He may decide to stop the sulfasalazine to see if this improves.

## 2019-11-08 ENCOUNTER — OFFICE VISIT (OUTPATIENT)
Dept: FAMILY MEDICINE CLINIC | Facility: CLINIC | Age: 64
End: 2019-11-08

## 2019-11-08 VITALS
OXYGEN SATURATION: 99 % | DIASTOLIC BLOOD PRESSURE: 84 MMHG | HEIGHT: 66 IN | HEART RATE: 68 BPM | SYSTOLIC BLOOD PRESSURE: 150 MMHG | WEIGHT: 187 LBS | BODY MASS INDEX: 30.05 KG/M2 | TEMPERATURE: 97.7 F

## 2019-11-08 DIAGNOSIS — I10 ESSENTIAL HYPERTENSION: ICD-10-CM

## 2019-11-08 DIAGNOSIS — J02.9 SORE THROAT: ICD-10-CM

## 2019-11-08 DIAGNOSIS — H93.19 TINNITUS, UNSPECIFIED LATERALITY: Primary | ICD-10-CM

## 2019-11-08 PROCEDURE — 99213 OFFICE O/P EST LOW 20 MIN: CPT | Performed by: INTERNAL MEDICINE

## 2019-11-08 RX ORDER — NEBIVOLOL 5 MG/1
5 TABLET ORAL DAILY
Qty: 90 TABLET | Refills: 1 | Status: SHIPPED | OUTPATIENT
Start: 2019-11-08 | End: 2020-08-19

## 2019-11-08 NOTE — PROGRESS NOTES
Subjective Chief complaint is ringing in the ears  Gelacio Cunningham is a 64 y.o. male.     History of Present Illness   Gelacio is here today for follow-up on his blood pressure but also complained of ringing in the ears.  Ringing is been going on for several weeks.  He does have a history of melanoma that is metastasized to the brain.  He had a recent MRI scan that showed no involvement of the 7th or 8th nerve.  He has not had any recent trauma to the ear.  He is not having any dizziness or vertigo.  Ringing seems to be worse at night.  He does have hypertension.  He is on Bystolic and he needs a refill on this.  He has had some mild hyperlipidemia.  Have been a little reluctant to put him on cholesterol medication because of all the other medicines that go through his liver.  He is going to continue to try watching his diet.    The following portions of the patient's history were reviewed and updated as appropriate: allergies, current medications, past family history, past medical history, past social history, past surgical history and problem list.    Review of Systems   HENT: Positive for sore throat and tinnitus. Negative for ear discharge and ear pain.        Objective   Physical Exam   HENT:   Mouth/Throat: Oropharynx is clear and moist. No oropharyngeal exudate.   Tympanic membranes and external auditory canals are normal.   Cardiovascular: Normal rate, regular rhythm and normal heart sounds.   Repeat blood pressure is 138/76 to my exam   Pulmonary/Chest: Effort normal and breath sounds normal.   Nursing note and vitals reviewed.        Assessment/Plan   Gelacio was seen today for sore throat, tinnitus and flu vaccine.    Diagnoses and all orders for this visit:    Tinnitus, unspecified laterality    Sore throat    Essential hypertension    Other orders  -     nebivolol (BYSTOLIC) 5 MG tablet; Take 1 tablet by mouth Daily.    Gelacio is here today for follow-up on his blood pressure but is having a problem with  ringing in his ears.  Unfortunately I do not have a great option for this.  We did discuss that formal hearing testing may be in order but he is going to wait on that.  We did discuss that over-the-counter medicine such as junk on magnesium have some anecdotal benefit.  We also discussed that sometimes low-dose antidepressants will distract the brain from the ringing but will not actually stop the ringing.  For now he is going to simply observe this.

## 2019-11-19 RX ORDER — PREDNISONE 20 MG/1
60 TABLET ORAL DAILY
Qty: 30 TABLET | Refills: 1 | Status: SHIPPED | OUTPATIENT
Start: 2019-11-19 | End: 2019-11-29 | Stop reason: HOSPADM

## 2019-11-19 NOTE — TELEPHONE ENCOUNTER
Received refill request for prednisone. According to Dr. Hummel's last note, pt takes prednisone only as needed at this point. Called and spoke with pt. He states when his RA flares he takes 20mg of prednisone on day one and 10mg on days 2 and 3. [facility]

## 2019-11-20 DIAGNOSIS — C78.7 METASTATIC MELANOMA TO LIVER (HCC): ICD-10-CM

## 2019-11-20 DIAGNOSIS — C80.1: ICD-10-CM

## 2019-11-20 RX ORDER — GABAPENTIN 300 MG/1
300 CAPSULE ORAL DAILY
Qty: 90 CAPSULE | Refills: 1 | Status: SHIPPED | OUTPATIENT
Start: 2019-11-20 | End: 2020-09-11 | Stop reason: SDUPTHER

## 2019-11-28 ENCOUNTER — APPOINTMENT (OUTPATIENT)
Dept: MRI IMAGING | Facility: HOSPITAL | Age: 64
End: 2019-11-28

## 2019-11-28 ENCOUNTER — HOSPITAL ENCOUNTER (OUTPATIENT)
Facility: HOSPITAL | Age: 64
Discharge: HOME | End: 2019-11-29
Attending: EMERGENCY MEDICINE | Admitting: INTERNAL MEDICINE

## 2019-11-28 DIAGNOSIS — M51.26 LUMBAR HERNIATED DISC: ICD-10-CM

## 2019-11-28 DIAGNOSIS — M48.061 SPINAL STENOSIS AT L4-L5 LEVEL: Primary | ICD-10-CM

## 2019-11-28 PROBLEM — M48.062 SPINAL STENOSIS OF LUMBAR REGION WITH NEUROGENIC CLAUDICATION: Status: ACTIVE | Noted: 2019-11-28

## 2019-11-28 PROBLEM — M43.16 SPONDYLOLISTHESIS OF LUMBAR REGION: Status: ACTIVE | Noted: 2019-11-28

## 2019-11-28 LAB
ALBUMIN SERPL-MCNC: 4.1 G/DL (ref 3.5–5.2)
ALBUMIN SERPL-MCNC: 4.2 G/DL (ref 3.5–5.2)
ALBUMIN/GLOB SERPL: 1.6 G/DL
ALP SERPL-CCNC: 67 U/L (ref 39–117)
ALP SERPL-CCNC: 71 U/L (ref 39–117)
ALT SERPL W P-5'-P-CCNC: 23 U/L (ref 1–41)
ALT SERPL W P-5'-P-CCNC: 24 U/L (ref 1–41)
ANION GAP SERPL CALCULATED.3IONS-SCNC: 10.5 MMOL/L (ref 5–15)
APTT PPP: 31 SECONDS (ref 22.7–35.4)
AST SERPL-CCNC: 21 U/L (ref 1–40)
AST SERPL-CCNC: 23 U/L (ref 1–40)
BASOPHILS # BLD AUTO: 0.04 10*3/MM3 (ref 0–0.2)
BASOPHILS NFR BLD AUTO: 0.6 % (ref 0–1.5)
BILIRUB CONJ SERPL-MCNC: <0.2 MG/DL (ref 0.2–0.3)
BILIRUB INDIRECT SERPL-MCNC: ABNORMAL MG/DL
BILIRUB SERPL-MCNC: 0.3 MG/DL (ref 0.2–1.2)
BILIRUB SERPL-MCNC: 0.4 MG/DL (ref 0.2–1.2)
BILIRUB UR QL STRIP: NEGATIVE
BUN BLD-MCNC: 15 MG/DL (ref 8–23)
BUN/CREAT SERPL: 12.7 (ref 7–25)
CALCIUM SPEC-SCNC: 9 MG/DL (ref 8.6–10.5)
CHLORIDE SERPL-SCNC: 105 MMOL/L (ref 98–107)
CLARITY UR: CLEAR
CO2 SERPL-SCNC: 27.5 MMOL/L (ref 22–29)
COLOR UR: YELLOW
CREAT BLD-MCNC: 1.18 MG/DL (ref 0.76–1.27)
DEPRECATED RDW RBC AUTO: 44.2 FL (ref 37–54)
EOSINOPHIL # BLD AUTO: 0.49 10*3/MM3 (ref 0–0.4)
EOSINOPHIL NFR BLD AUTO: 7.6 % (ref 0.3–6.2)
ERYTHROCYTE [DISTWIDTH] IN BLOOD BY AUTOMATED COUNT: 14 % (ref 12.3–15.4)
GFR SERPL CREATININE-BSD FRML MDRD: 63 ML/MIN/1.73
GLOBULIN UR ELPH-MCNC: 2.6 GM/DL
GLUCOSE BLD-MCNC: 102 MG/DL (ref 65–99)
GLUCOSE UR STRIP-MCNC: NEGATIVE MG/DL
HCT VFR BLD AUTO: 39.8 % (ref 37.5–51)
HGB BLD-MCNC: 13.5 G/DL (ref 13–17.7)
HGB UR QL STRIP.AUTO: NEGATIVE
IMM GRANULOCYTES # BLD AUTO: 0.04 10*3/MM3 (ref 0–0.05)
IMM GRANULOCYTES NFR BLD AUTO: 0.6 % (ref 0–0.5)
INR PPP: 1.08 (ref 0.9–1.1)
KETONES UR QL STRIP: NEGATIVE
LEUKOCYTE ESTERASE UR QL STRIP.AUTO: NEGATIVE
LYMPHOCYTES # BLD AUTO: 1.57 10*3/MM3 (ref 0.7–3.1)
LYMPHOCYTES NFR BLD AUTO: 24.4 % (ref 19.6–45.3)
MCH RBC QN AUTO: 29.7 PG (ref 26.6–33)
MCHC RBC AUTO-ENTMCNC: 33.9 G/DL (ref 31.5–35.7)
MCV RBC AUTO: 87.5 FL (ref 79–97)
MONOCYTES # BLD AUTO: 0.5 10*3/MM3 (ref 0.1–0.9)
MONOCYTES NFR BLD AUTO: 7.8 % (ref 5–12)
NEUTROPHILS # BLD AUTO: 3.79 10*3/MM3 (ref 1.7–7)
NEUTROPHILS NFR BLD AUTO: 59 % (ref 42.7–76)
NITRITE UR QL STRIP: NEGATIVE
NRBC BLD AUTO-RTO: 0 /100 WBC (ref 0–0.2)
PH UR STRIP.AUTO: 7.5 [PH] (ref 5–8)
PLATELET # BLD AUTO: 238 10*3/MM3 (ref 140–450)
PMV BLD AUTO: 10.4 FL (ref 6–12)
POTASSIUM BLD-SCNC: 4.1 MMOL/L (ref 3.5–5.2)
PROT SERPL-MCNC: 6.8 G/DL (ref 6–8.5)
PROT SERPL-MCNC: 7 G/DL (ref 6–8.5)
PROT UR QL STRIP: NEGATIVE
PROTHROMBIN TIME: 13.8 SECONDS (ref 11.7–14.2)
RBC # BLD AUTO: 4.55 10*6/MM3 (ref 4.14–5.8)
SODIUM BLD-SCNC: 143 MMOL/L (ref 136–145)
SP GR UR STRIP: 1.02 (ref 1–1.03)
UROBILINOGEN UR QL STRIP: NORMAL
WBC NRBC COR # BLD: 6.43 10*3/MM3 (ref 3.4–10.8)

## 2019-11-28 PROCEDURE — 36415 COLL VENOUS BLD VENIPUNCTURE: CPT | Performed by: NEUROLOGICAL SURGERY

## 2019-11-28 PROCEDURE — 25010000003 HYDROCORTISONE SOD SUCCINATE PF 250 MG RECONSTITUTED SOLUTION: Performed by: NEUROLOGICAL SURGERY

## 2019-11-28 PROCEDURE — 85610 PROTHROMBIN TIME: CPT | Performed by: NEUROLOGICAL SURGERY

## 2019-11-28 PROCEDURE — G0378 HOSPITAL OBSERVATION PER HR: HCPCS

## 2019-11-28 PROCEDURE — 51798 US URINE CAPACITY MEASURE: CPT

## 2019-11-28 PROCEDURE — A9577 INJ MULTIHANCE: HCPCS | Performed by: EMERGENCY MEDICINE

## 2019-11-28 PROCEDURE — 85730 THROMBOPLASTIN TIME PARTIAL: CPT | Performed by: NEUROLOGICAL SURGERY

## 2019-11-28 PROCEDURE — 0 GADOBENATE DIMEGLUMINE 529 MG/ML SOLUTION: Performed by: EMERGENCY MEDICINE

## 2019-11-28 PROCEDURE — 99221 1ST HOSP IP/OBS SF/LOW 40: CPT | Performed by: NEUROLOGICAL SURGERY

## 2019-11-28 PROCEDURE — 96376 TX/PRO/DX INJ SAME DRUG ADON: CPT

## 2019-11-28 PROCEDURE — 96375 TX/PRO/DX INJ NEW DRUG ADDON: CPT

## 2019-11-28 PROCEDURE — 80053 COMPREHEN METABOLIC PANEL: CPT | Performed by: EMERGENCY MEDICINE

## 2019-11-28 PROCEDURE — 99283 EMERGENCY DEPT VISIT LOW MDM: CPT

## 2019-11-28 PROCEDURE — 96374 THER/PROPH/DIAG INJ IV PUSH: CPT

## 2019-11-28 PROCEDURE — 72157 MRI CHEST SPINE W/O & W/DYE: CPT

## 2019-11-28 PROCEDURE — 72158 MRI LUMBAR SPINE W/O & W/DYE: CPT

## 2019-11-28 PROCEDURE — 85025 COMPLETE CBC W/AUTO DIFF WBC: CPT | Performed by: EMERGENCY MEDICINE

## 2019-11-28 PROCEDURE — 80076 HEPATIC FUNCTION PANEL: CPT | Performed by: NEUROLOGICAL SURGERY

## 2019-11-28 PROCEDURE — 81003 URINALYSIS AUTO W/O SCOPE: CPT | Performed by: EMERGENCY MEDICINE

## 2019-11-28 RX ORDER — FOLIC ACID 1 MG/1
1 TABLET ORAL DAILY
Status: DISCONTINUED | OUTPATIENT
Start: 2019-11-28 | End: 2019-11-29 | Stop reason: HOSPADM

## 2019-11-28 RX ORDER — FAMOTIDINE 10 MG/ML
20 INJECTION, SOLUTION INTRAVENOUS EVERY 12 HOURS SCHEDULED
Status: DISCONTINUED | OUTPATIENT
Start: 2019-11-28 | End: 2019-11-29

## 2019-11-28 RX ORDER — ACETAMINOPHEN 325 MG/1
650 TABLET ORAL EVERY 4 HOURS PRN
Status: DISCONTINUED | OUTPATIENT
Start: 2019-11-28 | End: 2019-11-28

## 2019-11-28 RX ORDER — ACETAMINOPHEN 650 MG/1
650 SUPPOSITORY RECTAL EVERY 4 HOURS PRN
Status: DISCONTINUED | OUTPATIENT
Start: 2019-11-28 | End: 2019-11-28

## 2019-11-28 RX ORDER — MORPHINE SULFATE 2 MG/ML
2 INJECTION, SOLUTION INTRAMUSCULAR; INTRAVENOUS
Status: DISCONTINUED | OUTPATIENT
Start: 2019-11-28 | End: 2019-11-29 | Stop reason: HOSPADM

## 2019-11-28 RX ORDER — ONDANSETRON 2 MG/ML
4 INJECTION INTRAMUSCULAR; INTRAVENOUS EVERY 6 HOURS PRN
Status: DISCONTINUED | OUTPATIENT
Start: 2019-11-28 | End: 2019-11-29 | Stop reason: HOSPADM

## 2019-11-28 RX ORDER — NEBIVOLOL 5 MG/1
5 TABLET ORAL DAILY
Status: DISCONTINUED | OUTPATIENT
Start: 2019-11-28 | End: 2019-11-29 | Stop reason: HOSPADM

## 2019-11-28 RX ORDER — OXYCODONE HYDROCHLORIDE 5 MG/1
5 TABLET ORAL EVERY 6 HOURS PRN
Status: DISCONTINUED | OUTPATIENT
Start: 2019-11-28 | End: 2019-11-29 | Stop reason: HOSPADM

## 2019-11-28 RX ORDER — ACETAMINOPHEN 160 MG/5ML
650 SOLUTION ORAL EVERY 4 HOURS PRN
Status: DISCONTINUED | OUTPATIENT
Start: 2019-11-28 | End: 2019-11-28

## 2019-11-28 RX ORDER — NALOXONE HCL 0.4 MG/ML
0.4 VIAL (ML) INJECTION
Status: DISCONTINUED | OUTPATIENT
Start: 2019-11-28 | End: 2019-11-29 | Stop reason: HOSPADM

## 2019-11-28 RX ORDER — GABAPENTIN 300 MG/1
300 CAPSULE ORAL DAILY
Status: DISCONTINUED | OUTPATIENT
Start: 2019-11-28 | End: 2019-11-29 | Stop reason: HOSPADM

## 2019-11-28 RX ORDER — HYDROCODONE BITARTRATE AND ACETAMINOPHEN 5; 325 MG/1; MG/1
1 TABLET ORAL EVERY 4 HOURS PRN
Status: DISCONTINUED | OUTPATIENT
Start: 2019-11-28 | End: 2019-11-28

## 2019-11-28 RX ORDER — SODIUM CHLORIDE 0.9 % (FLUSH) 0.9 %
10 SYRINGE (ML) INJECTION EVERY 12 HOURS SCHEDULED
Status: DISCONTINUED | OUTPATIENT
Start: 2019-11-28 | End: 2019-11-29 | Stop reason: HOSPADM

## 2019-11-28 RX ORDER — SODIUM CHLORIDE 0.9 % (FLUSH) 0.9 %
10 SYRINGE (ML) INJECTION AS NEEDED
Status: DISCONTINUED | OUTPATIENT
Start: 2019-11-28 | End: 2019-11-29 | Stop reason: HOSPADM

## 2019-11-28 RX ADMIN — FOLIC ACID 1 MG: 1 TABLET ORAL at 16:20

## 2019-11-28 RX ADMIN — FAMOTIDINE 20 MG: 10 INJECTION INTRAVENOUS at 13:47

## 2019-11-28 RX ADMIN — GADOBENATE DIMEGLUMINE 17 ML: 529 INJECTION, SOLUTION INTRAVENOUS at 09:36

## 2019-11-28 RX ADMIN — HYDROCORTISONE SODIUM SUCCINATE 250 MG: 250 INJECTION, POWDER, FOR SOLUTION INTRAMUSCULAR; INTRAVENOUS at 20:33

## 2019-11-28 RX ADMIN — GABAPENTIN 300 MG: 300 CAPSULE ORAL at 16:20

## 2019-11-28 RX ADMIN — SODIUM CHLORIDE, PRESERVATIVE FREE 10 ML: 5 INJECTION INTRAVENOUS at 20:28

## 2019-11-28 RX ADMIN — HYDROCORTISONE SODIUM SUCCINATE 250 MG: 250 INJECTION, POWDER, FOR SOLUTION INTRAMUSCULAR; INTRAVENOUS at 13:49

## 2019-11-28 RX ADMIN — NEBIVOLOL HYDROCHLORIDE 5 MG: 5 TABLET ORAL at 16:19

## 2019-11-29 VITALS
HEART RATE: 76 BPM | BODY MASS INDEX: 29.35 KG/M2 | TEMPERATURE: 97.9 F | HEIGHT: 67 IN | DIASTOLIC BLOOD PRESSURE: 69 MMHG | SYSTOLIC BLOOD PRESSURE: 130 MMHG | RESPIRATION RATE: 18 BRPM | OXYGEN SATURATION: 93 % | WEIGHT: 187 LBS

## 2019-11-29 LAB
ANION GAP SERPL CALCULATED.3IONS-SCNC: 12.9 MMOL/L (ref 5–15)
BASOPHILS # BLD AUTO: 0.01 10*3/MM3 (ref 0–0.2)
BASOPHILS NFR BLD AUTO: 0.1 % (ref 0–1.5)
BUN BLD-MCNC: 16 MG/DL (ref 8–23)
BUN/CREAT SERPL: 15 (ref 7–25)
CALCIUM SPEC-SCNC: 9.1 MG/DL (ref 8.6–10.5)
CHLORIDE SERPL-SCNC: 105 MMOL/L (ref 98–107)
CO2 SERPL-SCNC: 24.1 MMOL/L (ref 22–29)
CREAT BLD-MCNC: 1.07 MG/DL (ref 0.76–1.27)
DEPRECATED RDW RBC AUTO: 42.6 FL (ref 37–54)
EOSINOPHIL # BLD AUTO: 0 10*3/MM3 (ref 0–0.4)
EOSINOPHIL NFR BLD AUTO: 0 % (ref 0.3–6.2)
ERYTHROCYTE [DISTWIDTH] IN BLOOD BY AUTOMATED COUNT: 13.8 % (ref 12.3–15.4)
GFR SERPL CREATININE-BSD FRML MDRD: 70 ML/MIN/1.73
GLUCOSE BLD-MCNC: 138 MG/DL (ref 65–99)
HCT VFR BLD AUTO: 39.8 % (ref 37.5–51)
HGB BLD-MCNC: 13.8 G/DL (ref 13–17.7)
IMM GRANULOCYTES # BLD AUTO: 0.06 10*3/MM3 (ref 0–0.05)
IMM GRANULOCYTES NFR BLD AUTO: 0.7 % (ref 0–0.5)
LYMPHOCYTES # BLD AUTO: 0.9 10*3/MM3 (ref 0.7–3.1)
LYMPHOCYTES NFR BLD AUTO: 9.8 % (ref 19.6–45.3)
MCH RBC QN AUTO: 29.8 PG (ref 26.6–33)
MCHC RBC AUTO-ENTMCNC: 34.7 G/DL (ref 31.5–35.7)
MCV RBC AUTO: 86 FL (ref 79–97)
MONOCYTES # BLD AUTO: 0.12 10*3/MM3 (ref 0.1–0.9)
MONOCYTES NFR BLD AUTO: 1.3 % (ref 5–12)
NEUTROPHILS # BLD AUTO: 8.05 10*3/MM3 (ref 1.7–7)
NEUTROPHILS NFR BLD AUTO: 88.1 % (ref 42.7–76)
NRBC BLD AUTO-RTO: 0 /100 WBC (ref 0–0.2)
PLATELET # BLD AUTO: 253 10*3/MM3 (ref 140–450)
PMV BLD AUTO: 10 FL (ref 6–12)
POTASSIUM BLD-SCNC: 4.5 MMOL/L (ref 3.5–5.2)
RBC # BLD AUTO: 4.63 10*6/MM3 (ref 4.14–5.8)
SODIUM BLD-SCNC: 142 MMOL/L (ref 136–145)
WBC NRBC COR # BLD: 9.14 10*3/MM3 (ref 3.4–10.8)

## 2019-11-29 PROCEDURE — 96376 TX/PRO/DX INJ SAME DRUG ADON: CPT

## 2019-11-29 PROCEDURE — G0378 HOSPITAL OBSERVATION PER HR: HCPCS

## 2019-11-29 PROCEDURE — 25010000003 HYDROCORTISONE SOD SUCCINATE PF 250 MG RECONSTITUTED SOLUTION: Performed by: NEUROLOGICAL SURGERY

## 2019-11-29 PROCEDURE — 99231 SBSQ HOSP IP/OBS SF/LOW 25: CPT | Performed by: NEUROLOGICAL SURGERY

## 2019-11-29 PROCEDURE — 80048 BASIC METABOLIC PNL TOTAL CA: CPT | Performed by: INTERNAL MEDICINE

## 2019-11-29 PROCEDURE — 85025 COMPLETE CBC W/AUTO DIFF WBC: CPT | Performed by: INTERNAL MEDICINE

## 2019-11-29 RX ORDER — DEXAMETHASONE 1.5 MG/1
4.5 TABLET ORAL 2 TIMES DAILY WITH MEALS
Status: DISCONTINUED | OUTPATIENT
Start: 2019-11-29 | End: 2019-11-29 | Stop reason: HOSPADM

## 2019-11-29 RX ORDER — DEXAMETHASONE 1.5 MG/1
3 TABLET ORAL 2 TIMES DAILY WITH MEALS
Qty: 16 TABLET | Refills: 0 | Status: SHIPPED | OUTPATIENT
Start: 2019-12-03 | End: 2019-12-07

## 2019-11-29 RX ORDER — DEXAMETHASONE 1.5 MG/1
3 TABLET ORAL 2 TIMES DAILY WITH MEALS
Status: DISCONTINUED | OUTPATIENT
Start: 2019-12-03 | End: 2019-11-29 | Stop reason: HOSPADM

## 2019-11-29 RX ORDER — DEXAMETHASONE 1.5 MG/1
1.5 TABLET ORAL 2 TIMES DAILY WITH MEALS
Status: DISCONTINUED | OUTPATIENT
Start: 2019-12-07 | End: 2019-11-29 | Stop reason: HOSPADM

## 2019-11-29 RX ORDER — DEXAMETHASONE 1.5 MG/1
4.5 TABLET ORAL 2 TIMES DAILY WITH MEALS
Qty: 24 TABLET | Refills: 0 | Status: SHIPPED | OUTPATIENT
Start: 2019-11-29 | End: 2019-12-03

## 2019-11-29 RX ORDER — DEXAMETHASONE 1.5 MG/1
1.5 TABLET ORAL 2 TIMES DAILY WITH MEALS
Qty: 8 TABLET | Refills: 0 | Status: SHIPPED | OUTPATIENT
Start: 2019-12-07 | End: 2019-12-11

## 2019-11-29 RX ADMIN — GABAPENTIN 300 MG: 300 CAPSULE ORAL at 08:34

## 2019-11-29 RX ADMIN — SODIUM CHLORIDE, PRESERVATIVE FREE 10 ML: 5 INJECTION INTRAVENOUS at 08:34

## 2019-11-29 RX ADMIN — NEBIVOLOL HYDROCHLORIDE 5 MG: 5 TABLET ORAL at 08:34

## 2019-11-29 RX ADMIN — FOLIC ACID 1 MG: 1 TABLET ORAL at 08:34

## 2019-11-29 RX ADMIN — SODIUM CHLORIDE, PRESERVATIVE FREE 10 ML: 5 INJECTION INTRAVENOUS at 02:55

## 2019-11-29 RX ADMIN — HYDROCORTISONE SODIUM SUCCINATE 250 MG: 250 INJECTION, POWDER, FOR SOLUTION INTRAMUSCULAR; INTRAVENOUS at 08:34

## 2019-11-29 RX ADMIN — DEXAMETHASONE 4.5 MG: 1.5 TABLET ORAL at 13:19

## 2019-11-29 RX ADMIN — HYDROCORTISONE SODIUM SUCCINATE 250 MG: 250 INJECTION, POWDER, FOR SOLUTION INTRAMUSCULAR; INTRAVENOUS at 02:55

## 2019-11-29 RX ADMIN — FAMOTIDINE 20 MG: 10 INJECTION INTRAVENOUS at 08:34

## 2019-11-30 ENCOUNTER — READMISSION MANAGEMENT (OUTPATIENT)
Dept: CALL CENTER | Facility: HOSPITAL | Age: 64
End: 2019-11-30

## 2019-11-30 NOTE — OUTREACH NOTE
Prep Survey      Responses   Facility patient discharged from?  [facility]   Is patient eligible?  Yes   Discharge diagnosis  Spinal stenosis of lumbar region    Does the patient have one of the following disease processes/diagnoses(primary or secondary)?  Other   Does the patient have Home health ordered?  No   Is there a DME ordered?  No   Prep survey completed?  Yes          Jacquelyn Aguilar RN

## 2019-12-03 ENCOUNTER — READMISSION MANAGEMENT (OUTPATIENT)
Dept: CALL CENTER | Facility: HOSPITAL | Age: 64
End: 2019-12-03

## 2019-12-03 NOTE — OUTREACH NOTE
Medical Week 1 Survey      Responses   Facility patient discharged from?  [place]   Does the patient have one of the following disease processes/diagnoses(primary or secondary)?  Other   Is there a successful TCM telephone encounter documented?  No   Week 1 attempt successful?  Yes   Call start time  1627   Call end time  1631   Discharge diagnosis  Spinal stenosis of lumbar region    Is patient permission given to speak with other caregiver?  Yes   List who call center can speak with  Angelique, Wife   Person spoke with today (if not patient) and relationship  Angelique, wife   Meds reviewed with patient/caregiver?  Yes   Is the patient having any side effects they believe may be caused by any medication additions or changes?  No   Does the patient have all medications ordered at discharge?  Yes   Is the patient taking all medications as directed (includes completed medication regime)?  Yes   Medication comments  States when he was d/c'd the patient wasn't able to get meds until yesterday because the pharmacy didn't have it. Has stated now.    Does the patient have a primary care provider?   Yes   Does the patient have an appointment with their PCP within 7 days of discharge?  N/A   Has the patient kept scheduled appointments due by today?  N/A   Has home health visited the patient within 72 hours of discharge?  N/A   Psychosocial issues?  No   Comments  uses cane as needed   Did the patient receive a copy of their discharge instructions?  Yes   Nursing interventions  Reviewed instructions with patient   What is the patient's perception of their health status since discharge?  Improving   Is the patient/caregiver able to teach back signs and symptoms related to disease process for when to call PCP?  Yes   Is the patient/caregiver able to teach back signs and symptoms related to disease process for when to call 911?  Yes   Is the patient/caregiver able to teach back the hierarchy of who to call/visit for  symptoms/problems? PCP, Specialist, Home health nurse, Urgent Care, ED, 911  Yes   Additional teach back comments  States he is getting better. They are going to [place] next week.   Week 1 call completed?  Yes          Gianna Bailey RN

## 2019-12-11 ENCOUNTER — READMISSION MANAGEMENT (OUTPATIENT)
Dept: CALL CENTER | Facility: HOSPITAL | Age: 64
End: 2019-12-11

## 2019-12-11 NOTE — OUTREACH NOTE
Medical Week 2 Survey      Responses   Facility patient discharged from?  [place]   Does the patient have one of the following disease processes/diagnoses(primary or secondary)?  Other   Week 2 attempt successful?  No          Amanda Good RN

## 2019-12-12 ENCOUNTER — READMISSION MANAGEMENT (OUTPATIENT)
Dept: CALL CENTER | Facility: HOSPITAL | Age: 64
End: 2019-12-12

## 2019-12-12 NOTE — OUTREACH NOTE
Medical Week 2 Survey      Responses   Facility patient discharged from?  [place]   Does the patient have one of the following disease processes/diagnoses(primary or secondary)?  Other   Week 2 attempt successful?  No   Unsuccessful attempts  Attempt 1          Fer Jorge RN

## 2019-12-15 ENCOUNTER — READMISSION MANAGEMENT (OUTPATIENT)
Dept: CALL CENTER | Facility: HOSPITAL | Age: 64
End: 2019-12-15

## 2019-12-15 NOTE — OUTREACH NOTE
Medical Week 2 Survey      Responses   Facility patient discharged from?  [facility]   Does the patient have one of the following disease processes/diagnoses(primary or secondary)?  Other   Week 2 attempt successful?  Yes   Call start time  1324   Call end time  1327   Is patient permission given to speak with other caregiver?  Yes   Medication alerts for this patient  no changes in medication   Meds reviewed with patient/caregiver?  Yes   Is the patient taking all medications as directed (includes completed medication regime)?  Yes   Comments regarding appointments  not made an appointment, has been on  vacation   Does the patient have a primary care provider?   Yes   Has the patient kept scheduled appointments due by today?  N/A   Comments  Will  making an appointment this week   Did the patient receive a copy of their discharge instructions?  Yes   What is the patient's perception of their health status since discharge?  Improving   Is the patient/caregiver able to teach back signs and symptoms related to disease process for when to call PCP?  Yes   Is the patient/caregiver able to teach back signs and symptoms related to disease process for when to call 911?  Yes   Is the patient/caregiver able to teach back the hierarchy of who to call/visit for symptoms/problems? PCP, Specialist, Home health nurse, Urgent Care, ED, 911  Yes   Week 2 Call Completed?  Yes   Wrap up additional comments  wife voiced no concerns          Maritza Cunningham RN

## 2019-12-16 ENCOUNTER — TELEPHONE (OUTPATIENT)
Dept: NEUROSURGERY | Facility: CLINIC | Age: 64
End: 2019-12-16

## 2019-12-16 NOTE — TELEPHONE ENCOUNTER
----- Message from Francisco Mcdonough MD sent at 11/29/2019 10:55 AM EST -----  This patient needs to be seen in the office in a couple of weeks.  Please contact him on Tuesday or Wednesday to schedule.  Unless he calls on Monday.

## 2019-12-16 NOTE — TELEPHONE ENCOUNTER
I spoke with pt's wife. They have been on a pre-planned vacation and have just returned. She will speak with the pt and have him call for a follow up.

## 2019-12-23 ENCOUNTER — READMISSION MANAGEMENT (OUTPATIENT)
Dept: CALL CENTER | Facility: HOSPITAL | Age: 64
End: 2019-12-23

## 2019-12-23 NOTE — OUTREACH NOTE
Medical Week 3 Survey      Responses   Facility patient discharged from?  [facility]   Does the patient have one of the following disease processes/diagnoses(primary or secondary)?  Other   Week 3 attempt successful?  No   Unsuccessful attempts  Attempt 1          Christiana Mendez RN

## 2019-12-26 ENCOUNTER — READMISSION MANAGEMENT (OUTPATIENT)
Dept: CALL CENTER | Facility: HOSPITAL | Age: 64
End: 2019-12-26

## 2019-12-26 NOTE — OUTREACH NOTE
Medical Week 3 Survey      Responses   Facility patient discharged from?  [facility]   Does the patient have one of the following disease processes/diagnoses(primary or secondary)?  Other   Week 3 attempt successful?  Yes   Call start time  0931   Call end time  0934   Discharge diagnosis  Spinal stenosis of lumbar region    Is patient permission given to speak with other caregiver?  Yes   List who call center can speak with  Angelique, Wife   Person spoke with today (if not patient) and relationship  Angelique, wife   Meds reviewed with patient/caregiver?  Yes   Is the patient taking all medications as directed (includes completed medication regime)?  Yes   Has the patient kept scheduled appointments due by today?  N/A   Comments  Will make appt after the holidays.   What is the patient's perception of their health status since discharge?  Same   Additional teach back comments  Wife states pt doing well and is tolerating meds well.    Week 3 Call Completed?  Yes          Ayde Porter RN

## 2020-01-06 ENCOUNTER — READMISSION MANAGEMENT (OUTPATIENT)
Dept: CALL CENTER | Facility: HOSPITAL | Age: 65
End: 2020-01-06

## 2020-01-06 NOTE — OUTREACH NOTE
Medical Week 4 Survey      Responses   Facility patient discharged from?  [facility]   Does the patient have one of the following disease processes/diagnoses(primary or secondary)?  Other   Week 4 attempt successful?  No          Bety Corral RN

## 2020-01-09 ENCOUNTER — HOSPITAL ENCOUNTER (OUTPATIENT)
Dept: PET IMAGING | Facility: HOSPITAL | Age: 65
End: 2020-01-09

## 2020-01-09 ENCOUNTER — HOSPITAL ENCOUNTER (OUTPATIENT)
Dept: PET IMAGING | Facility: HOSPITAL | Age: 65
Discharge: HOME | End: 2020-01-09
Admitting: INTERNAL MEDICINE

## 2020-01-09 ENCOUNTER — HOSPITAL ENCOUNTER (OUTPATIENT)
Dept: MRI IMAGING | Facility: HOSPITAL | Age: 65
Discharge: HOME | End: 2020-01-09

## 2020-01-09 ENCOUNTER — TELEPHONE (OUTPATIENT)
Dept: ONCOLOGY | Facility: CLINIC | Age: 65
End: 2020-01-09

## 2020-01-09 DIAGNOSIS — C80.1: ICD-10-CM

## 2020-01-09 DIAGNOSIS — C78.7 METASTATIC MELANOMA TO LIVER (HCC): ICD-10-CM

## 2020-01-09 LAB — CREAT BLDA-MCNC: 1.1 MG/DL (ref 0.6–1.3)

## 2020-01-09 PROCEDURE — 0 GADOBENATE DIMEGLUMINE 529 MG/ML SOLUTION: Performed by: INTERNAL MEDICINE

## 2020-01-09 PROCEDURE — A9577 INJ MULTIHANCE: HCPCS | Performed by: INTERNAL MEDICINE

## 2020-01-09 PROCEDURE — 82565 ASSAY OF CREATININE: CPT

## 2020-01-09 PROCEDURE — 70553 MRI BRAIN STEM W/O & W/DYE: CPT

## 2020-01-09 RX ADMIN — GADOBENATE DIMEGLUMINE 18 ML: 529 INJECTION, SOLUTION INTRAVENOUS at 13:43

## 2020-01-09 NOTE — TELEPHONE ENCOUNTER
Pt. Had a mri of the internal auditory canal on 10/30/19.  Inquiring if he needs to have the mri of the brain today in which dr. Hummel ordered.  D/w dr. Hummel, informed pt. He still needs to go for the mri of the brain today.  understanding noted.

## 2020-01-10 ENCOUNTER — HOSPITAL ENCOUNTER (OUTPATIENT)
Dept: PET IMAGING | Facility: HOSPITAL | Age: 65
Discharge: HOME | End: 2020-01-10

## 2020-01-10 ENCOUNTER — HOSPITAL ENCOUNTER (OUTPATIENT)
Dept: PET IMAGING | Facility: HOSPITAL | Age: 65
Discharge: HOME | End: 2020-01-10
Admitting: INTERNAL MEDICINE

## 2020-01-10 ENCOUNTER — LAB (OUTPATIENT)
Dept: LAB | Facility: HOSPITAL | Age: 65
End: 2020-01-10

## 2020-01-10 DIAGNOSIS — C80.1: ICD-10-CM

## 2020-01-10 DIAGNOSIS — C78.7 METASTATIC MELANOMA TO LIVER (HCC): ICD-10-CM

## 2020-01-10 LAB
ALBUMIN SERPL-MCNC: 4.2 G/DL (ref 3.5–5.2)
ALBUMIN/GLOB SERPL: 1.5 G/DL (ref 1.1–2.4)
ALP SERPL-CCNC: 79 U/L (ref 38–116)
ALT SERPL W P-5'-P-CCNC: 21 U/L (ref 0–41)
ANION GAP SERPL CALCULATED.3IONS-SCNC: 13.6 MMOL/L (ref 5–15)
AST SERPL-CCNC: 21 U/L (ref 0–40)
BASOPHILS # BLD AUTO: 0.06 10*3/MM3 (ref 0–0.2)
BASOPHILS NFR BLD AUTO: 0.8 % (ref 0–1.5)
BILIRUB SERPL-MCNC: 0.4 MG/DL (ref 0.2–1.2)
BUN BLD-MCNC: 11 MG/DL (ref 6–20)
BUN/CREAT SERPL: 10.2 (ref 7.3–30)
CALCIUM SPEC-SCNC: 9.3 MG/DL (ref 8.5–10.2)
CHLORIDE SERPL-SCNC: 102 MMOL/L (ref 98–107)
CO2 SERPL-SCNC: 25.4 MMOL/L (ref 22–29)
CREAT BLD-MCNC: 1.08 MG/DL (ref 0.7–1.3)
DEPRECATED RDW RBC AUTO: 43.8 FL (ref 37–54)
EOSINOPHIL # BLD AUTO: 0.13 10*3/MM3 (ref 0–0.4)
EOSINOPHIL NFR BLD AUTO: 1.8 % (ref 0.3–6.2)
ERYTHROCYTE [DISTWIDTH] IN BLOOD BY AUTOMATED COUNT: 13.3 % (ref 12.3–15.4)
GFR SERPL CREATININE-BSD FRML MDRD: 69 ML/MIN/1.73
GLOBULIN UR ELPH-MCNC: 2.8 GM/DL (ref 1.8–3.5)
GLUCOSE BLD-MCNC: 78 MG/DL (ref 74–124)
GLUCOSE BLDC GLUCOMTR-MCNC: 93 MG/DL (ref 70–130)
HCT VFR BLD AUTO: 41.8 % (ref 37.5–51)
HGB BLD-MCNC: 13.8 G/DL (ref 13–17.7)
IMM GRANULOCYTES # BLD AUTO: 0.14 10*3/MM3 (ref 0–0.05)
IMM GRANULOCYTES NFR BLD AUTO: 1.9 % (ref 0–0.5)
LDH SERPL-CCNC: 222 U/L (ref 99–259)
LYMPHOCYTES # BLD AUTO: 1.87 10*3/MM3 (ref 0.7–3.1)
LYMPHOCYTES NFR BLD AUTO: 25.5 % (ref 19.6–45.3)
MCH RBC QN AUTO: 29.7 PG (ref 26.6–33)
MCHC RBC AUTO-ENTMCNC: 33 G/DL (ref 31.5–35.7)
MCV RBC AUTO: 90.1 FL (ref 79–97)
MONOCYTES # BLD AUTO: 0.62 10*3/MM3 (ref 0.1–0.9)
MONOCYTES NFR BLD AUTO: 8.4 % (ref 5–12)
NEUTROPHILS # BLD AUTO: 4.52 10*3/MM3 (ref 1.7–7)
NEUTROPHILS NFR BLD AUTO: 61.6 % (ref 42.7–76)
NRBC BLD AUTO-RTO: 0 /100 WBC (ref 0–0.2)
PLATELET # BLD AUTO: 297 10*3/MM3 (ref 140–450)
PMV BLD AUTO: 10.9 FL (ref 6–12)
POTASSIUM BLD-SCNC: 3.7 MMOL/L (ref 3.5–4.7)
PROT SERPL-MCNC: 7 G/DL (ref 6.3–8)
RBC # BLD AUTO: 4.64 10*6/MM3 (ref 4.14–5.8)
SODIUM BLD-SCNC: 141 MMOL/L (ref 134–145)
WBC NRBC COR # BLD: 7.34 10*3/MM3 (ref 3.4–10.8)

## 2020-01-10 PROCEDURE — 80053 COMPREHEN METABOLIC PANEL: CPT

## 2020-01-10 PROCEDURE — 82962 GLUCOSE BLOOD TEST: CPT

## 2020-01-10 PROCEDURE — 85025 COMPLETE CBC W/AUTO DIFF WBC: CPT

## 2020-01-10 PROCEDURE — 78816 PET IMAGE W/CT FULL BODY: CPT

## 2020-01-10 PROCEDURE — A9552 F18 FDG: HCPCS | Performed by: INTERNAL MEDICINE

## 2020-01-10 PROCEDURE — 0 FLUDEOXYGLUCOSE F18 SOLUTION: Performed by: INTERNAL MEDICINE

## 2020-01-10 PROCEDURE — 36415 COLL VENOUS BLD VENIPUNCTURE: CPT

## 2020-01-10 PROCEDURE — 83615 LACTATE (LD) (LDH) ENZYME: CPT

## 2020-01-10 RX ADMIN — FLUDEOXYGLUCOSE F18 1 DOSE: 300 INJECTION INTRAVENOUS at 11:42

## 2020-01-13 ENCOUNTER — OFFICE VISIT (OUTPATIENT)
Dept: ONCOLOGY | Facility: CLINIC | Age: 65
End: 2020-01-13

## 2020-01-13 ENCOUNTER — APPOINTMENT (OUTPATIENT)
Dept: LAB | Facility: HOSPITAL | Age: 65
End: 2020-01-13

## 2020-01-13 VITALS
RESPIRATION RATE: 16 BRPM | DIASTOLIC BLOOD PRESSURE: 86 MMHG | TEMPERATURE: 97.9 F | SYSTOLIC BLOOD PRESSURE: 138 MMHG | HEART RATE: 96 BPM | WEIGHT: 195.4 LBS | HEIGHT: 66 IN | BODY MASS INDEX: 31.4 KG/M2 | OXYGEN SATURATION: 97 %

## 2020-01-13 DIAGNOSIS — C79.31 BRAIN METASTASES: ICD-10-CM

## 2020-01-13 DIAGNOSIS — C78.7 METASTATIC MELANOMA TO LIVER (HCC): Primary | ICD-10-CM

## 2020-01-13 PROCEDURE — 99214 OFFICE O/P EST MOD 30 MIN: CPT | Performed by: INTERNAL MEDICINE

## 2020-01-13 RX ORDER — PREDNISONE 10 MG/1
10-20 TABLET ORAL AS NEEDED
COMMUNITY
Start: 2019-11-18 | End: 2020-05-13 | Stop reason: HOSPADM

## 2020-01-13 RX ORDER — CYCLOBENZAPRINE HCL 5 MG
10 TABLET ORAL NIGHTLY PRN
COMMUNITY
Start: 2019-11-19 | End: 2020-06-02

## 2020-01-13 NOTE — PROGRESS NOTES
[facility] GROUP OUTPATIENT FOLLOW UP CLINIC VISIT    REASON FOR FOLLOW-UP:    1.  Metastatic BRAF/NRAS mutation negative melanoma with metastatic disease present in the lung, liver bone, and brain at diagnosis.  2.  History of rheumatoid arthritis on therapy with Rituxan  3.  Right upper quadrant abdominal pain secondary to metastatic melanoma  4.  He was seen by Dr. Islas at [facility] with recommendations for ipi/nivo following stereotactic radiosurgery for his brain  5.  He was seen by Dr. Rosado with radiation oncology with plans for stereotactic radiosurgery to his 2 brain lesions with treatment planning initiated on 3/8/2019.  Stereotactic treatment completed from 3/13/2019 through 3/22/2019.  1 fraction at 2400 cGy administered to the right parietal lesion in 5 fractions to 3000 cGy administered to the left frontal lesion.  6. Opdivo Yervoy initiated on 3/20/2019.  7.  He was seen on 3/28/2019 with 2 rashes one consistent with a drug rash and the other consistent with herpes zoster.  8.  Acute hepatitis secondary to immunotherapy after cycle #2.  Treated with steroids.  9.  He completed 3 cycles of combination therapy.  A PET scan on 6/24/2019 shows essentially complete response to therapy.  10.  Proceed with maintenance Opdivo every 4 weeks as of 6/27/2019.  11. Opdivo discontinued secondary to poor tolerance 7/25/2019.    HISTORY OF PRESENT ILLNESS:  Gelacio Cunningham is a 64 y.o. male with the above-mentioned history here today for scheduled follow-up to review imaging.    He continues to have tinnitus.  This is now worsening and he has gotten used to it.  He has occasional rash.  He has occasional abdominal pain but this is transient.  His energy level is excellent otherwise.  No other neurologic symptoms.    ONCOLOGIC HISTORY:  The patient has a past medical history significant for hypertension and rheumatoid arthritis.  For his rheumatoid arthritis he has been treated  with a number of agents in the past including methotrexate, Enbrel, Humira, and more recently rituximab which was started around 1-1/2 years ago with his most recent treatment administered around one month ago.  He has some chronic diffuse arthralgias which are reasonably well-controlled on rituximab.  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  This was diagnosed initially by his dermatologist Dr. Aggarwal.  He was referred to Dr. Vega and underwent surgery at [facility] in late 2017 with wide resection of the lesion and sentinel lymph node biopsy.  The patient reports that the margin was positive from the resection however lymph nodes were negative.  He had a second surgery with negative margins.  He did not receive any adjuvant therapy.     Recently, the patient developed symptoms around 2 weeks ago with mild nausea and loss of appetite.  He then developed around 1 week ago right-sided abdominal pain.  He presented to his primary care physician and underwent a right upper quadrant ultrasound on 1/24/19 showing multiple liver masses the largest of which measured 7.7 cm in the right hepatic lobe.  He was admitted and underwent CT of the abdomen and pelvis on 1/24/19 which confirmed multiple hepatic masses worrisome for metastatic disease with the largest lesion in the right lobe measuring 8.3 cm.  There was a 1 cm sclerotic nodule in the left sacral ala which was possibly related to a bone island.  There was a subtle area of narrowing around the mid transverse colon which was felt to possibly be related to wall thickening.  General surgery has been consulted and there are plans to pursue colonoscopy tomorrow.  He has undergone prior colonoscopy 12/23/16 with a single right-sided polyp removed.  He has now undergone CT chest 1/25/19 which showed 3 small pulmonary nodules in the left upper lobe measuring up to 9 mm which are indeterminate and a small 1 cm nodule in the left  lower lobe which may be focal atelectasis.  There were some trace bilateral pleural effusions.  The patient did undergo CT-guided liver biopsy 1/25/19 with pathology non-diagnostic.    He was discharged from the hospital.  He subsequently had a PET scan on 2/1/2019 which allowed us to target a different liver lesion that was not necrotic.  He did have a liver biopsy on 2/8/2019 with results consistent with metastatic melanoma.  The biopsy also showed evidence for acute cholangitis, increased portal fibrosis with mixed inflammation with lymphocytes and eosinophils as well as sinusoidal dilatation consistent with cardiac etiology.    A brain MRI on 2/25/2019 showed 2 brain lesions consistent with metastatic disease.  The largest is in the left frontal lobe measuring 3.8 cm with some evidence for hemorrhage and some vasogenic edema.  There is a smaller 5-6 mm lesion in the right parietal lobe.  He was seen by Dr. Rosado with plans for stereotactic radiosurgery with planning on 3/8/2019.    He completed radiation.    He completed 3 cycles of Opdivo and Yervoy complicated by hepatitis and extensive drug rash.  These resolved easily with high-dose prednisone.    MRI of the brain on 5/22/2019 shows a good response to radiation.    PET scan on 6/24/2019 shows essentially complete response to therapy.    As of 6/27/2019 proceed with maintenance Opdivo every 4 weeks.  His last dose was on that day due to toxicity.        ALLERGIES:  Allergies   Allergen Reactions   • Sulindac Diarrhea       MEDICATIONS:  The medication list has been reviewed with the patient by the medical assistant, and the list has been updated in the electronic medical record, which I reviewed.  Medication dosages and frequencies were confirmed to be accurate.    I have reviewed the patient's medical history in detail and updated the computerized patient record.    Review of Systems   Constitutional: Positive for fatigue (improved). Negative for appetite  "change, chills, diaphoresis, fever and unexpected weight change.   HENT:   Negative for trouble swallowing.    Respiratory: Negative for cough and shortness of breath.    Cardiovascular: Negative for chest pain.   Gastrointestinal: Positive for abdominal pain (improved). Negative for blood in stool, constipation, diarrhea and nausea.   Musculoskeletal: Positive for arthralgias (improved).   Skin: Positive for itching (Improved).   Neurological: Negative for dizziness and extremity weakness.   Hematological: Does not bruise/bleed easily.   Psychiatric/Behavioral: Positive for sleep disturbance (improved).   Review of systems unchanged from previous office visit except as updated      Vitals:    01/13/20 1402   BP: 138/86   Pulse: 96   Resp: 16   Temp: 97.9 °F (36.6 °C)   TempSrc: Oral   SpO2: 97%   Weight: 88.6 kg (195 lb 6.4 oz)   Height: 167.6 cm (65.98\")   PainSc: 0-No pain  Comment: liver cancer       PHYSICAL EXAMINATION:  GENERAL:  Well-developed well-nourished male; awake, alert and oriented, in no acute distress. He is accompanied by his wife.   SKIN: Warm and dry without rash  HEAD:  Normocephalic, atraumatic.  EYES:  Pupils equal, round and reactive to light.  Extraocular movements intact.  Conjunctivae normal.  EARS:  Hearing intact.  NOSE:  Septum midline.  No excoriations or nasal discharge.  CHEST: Regular respiratory effort.  HEART:  Regular rate; normal rhythm.  No murmurs, gallops or rubs.  Abdomen: Soft, nontender, nondistended, normal active bowel sounds, no hepatosplenomegaly  EXTREMITIES:  No clubbing, cyanosis, or edema.  NEUROLOGICAL:  No focal neurologic deficits.      DIAGNOSTIC DATA:  Results from last 7 days   Lab Units 01/10/20  1130   WBC 10*3/mm3 7.34   NEUTROS ABS 10*3/mm3 4.52   HEMOGLOBIN g/dL 13.8   HEMATOCRIT % 41.8   PLATELETS 10*3/mm3 297     Results from last 7 days   Lab Units 01/10/20  1130 01/09/20  1317   SODIUM mmol/L 141  --    POTASSIUM mmol/L 3.7  --    CHLORIDE mmol/L " 102  --    CO2 mmol/L 25.4  --    BUN mg/dL 11  --    CREATININE mg/dL 1.08 1.10   CALCIUM mg/dL 9.3  --    ALBUMIN g/dL 4.20  --    BILIRUBIN mg/dL 0.4  --    ALK PHOS U/L 79  --    ALT (SGPT) U/L 21  --    AST (SGOT) U/L 21  --    GLUCOSE mg/dL 78  --            IMAGING:     PET scan whole body on 1/10/2020 with no evidence of disease by my interpretation on review of imaging.  Radiology interpretation is pending.      MRI brain on 9/20/2020 without evidence of disease.  Images personally reviewed.      ASSESSMENT:  This is a 64 y.o. male with:  1. Metastatic melanoma:  · Patient with recent onset of symptoms including nausea, anorexia, right-sided abdominal pain over the 2 weeks preceeding admission in January 2019  · Abnormal right upper quadrant ultrasound showing multiple liver masses 1/24/19.  · CT abdomen and pelvis 1/24/19 confirmed multiple hepatic masses worrisome for metastatic disease with the largest lesion in the right lobe measuring 8.3 cm.  There was a 1 cm sclerotic nodule in the left sacral ala which was possibly related to a bone island.  There was a subtle area of narrowing around the mid transverse colon which was felt to possibly be related to wall thickening.  · CT chest 1/25/19 showed 3 small pulmonary nodules in the left upper lobe measuring up to 9 mm which are indeterminate and a small 1 cm nodule in the left lower lobe which may be focal atelectasis.  There were some trace bilateral pleural effusions.   · CT-guided liver biopsy 1/25/19 with pathology only showing blood.   · Colonoscopy by Dr. Rashid on 1/27 negative.  CEA 1/25/19 was normal at 1.4.   · Given the patient's history of melanoma in late 2017, there was concern regarding possible metastatic melanoma as well.  · Colonoscopy 1/27/2019 negative for malignancy.  · Discussed with Dr. Richardson with radiology.  Outpatient PET with repeat liver biopsy based on these results  · PET performed 2/1/2019  · Repeat liver biopsy on  2/8/2019 confirms metastatic melanoma with evidence also for sinusoidal dilatation consistent with cardiac etiology, increased portal fibrosis with mixed inflammation with lymphocytes and eosinophils, and acute cholangitis.  · BRAF/NRAS mutation negative  · Brain MRI on 2/25/2019 with 2 metastatic lesions.  One measures 3.8 cm in the left frontal lobe and the other measures 5-6 mm in the right parietal lobe.   · Stereotactic radiosurgery to be completed this Friday, March 22, 2019.    · Immunotherapy initiated 3/20/2019 with Opdivo Yervoy  · Rash following cycle #1.  2 rashes present one on his trunk and one on the left arm which appeared different and more vesicular consistent with zoster.  He was treated with a Medrol Dosepak and Valtrex with prompt resolution of his symptoms.  · Cycle 2 initiated on 4/10/2019.  Plan 4 cycles followed by imaging followed by maintenance Opdivo assuming a good response.  · Fever and night sweats following cycle #2 of therapy.  Cultures negative.  These have dissipated prior to cycle #3.  · Cycle #3 of therapy held due to acute hepatitis.  · Treated with steroids.  Prednisone finished 5/28/2019.  Liver labs had been improving.  · 3 cycles of therapy ultimately completed.  Complicated by significant rash.  Improved with steroids.  · PET scan 6/24/2019 with essentially complete response to therapy.  · As of 6/27/2019, proceed with maintenance Opdivo every 4 weeks.  · July 25, 2019 for scheduled Opdivo.  His performance status continues to decline with each treatment.  · He is seen August 6 2019  with reports of persistent fatigue, abdominal pain, decreased appetite and nausea.  · Symptoms significantly improved with addition of prednisone 10 mg  · Final Opdivo administered on 6/27/2019.  · CT imaging on 10/15/2019 without evidence for progression  · Whole-body PET scan 1/10/2020 as of 1/13/2020 radiology interpretation is pending but this appears negative.  MRI brain 1/9/2020 without  evidence for progression.    2. Rheumatoid arthritis:  · The patient has long-standing disease treated in the past with methotrexate, Enbrel, Humira, and more recently rituximab which was started around 1-1/2 years ago with his most recent treatment administered around late December 2018.    · He has chronic diffuse arthralgias which are reasonably well-controlled on rituximab.  · As above, as metastatic melanoma is identified, this issue may complicate use of immunotherapy.  · He did not complain of any symptoms of this today.    3. History of cutaneous melanoma of the right ear:  · Diagnosed initially by his dermatologist Dr. Aggarwal, appears to have been in the pre-auricular region.  Pathology reviewed from initial biopsy showing Breslow 0.82 mm, non-ulcerated lesion with low mitotic rate.  · He was referred to Dr. Vega and underwent surgery at [facility] in December 2017 with wide resection of the lesion and sentinel lymph node biopsy.  We do not yet have that pathology report however per notes available, there was a residual area of melanoma and sentinel nodes were negative.  He therefore underwent a second procedure for wide excision with presumably negative margins.     · He did not receive any adjuvant therapy.    4. Rash secondary to immunotherapy.  · 1 week following the initiation of Yervoy and Opdivo, the patient has developed a pruritic diffuse rash on his trunk, chest and back.  Unresponsive to OTC topical steroids.  · Treated with a medrol dose joel with prompt resolution  · Treated with high-dose steroids then after cycle #3 of therapy with improvement.  · Resolved.  He continues to have occasional rash, however.    5. Suspected varicella-zoster rash of the left upper inner arm.  · Along with the development of immune mediated dermatitis, the patient also developed suspected zoster of his left upper inner arm.  This appeared very different in appearance from his immune mediated drug  rash with linear vesicles.   · He was treated with Valtrex 1 g 3 times daily times 1 week with prompt resolution.  Unclear if this was really different than the other rash or not with prompt resolution and no pain involved.    6.  Pyrexia and diaphoresis: Likely secondary to immunotherapy.  Cultures were negative.  Not currently an issue.    7.  Brain metastases: Status post stereotactic radiosurgery.  Repeat MRI of the brain on 5/22/2019 with improvement.  No neurologic symptoms.  No evidence of recurrence on MRI.    8.  Acute hepatitis related to Opdivo and Yervoy.  Viral hepatitis studies negative.  Treated with steroids with resolution.    10.  Acute onset of abdominal discomfort that progressed with Opdivo treatments.  He was evaluated with CT scan last visit which revealed no acute abnormality.    11. Insomnia.  Improved with Restoril.    12.  Tinnitus: Unclear etiology.  MRI auditory canal showed no abnormality.  Consider referral to audiology but he states he is getting used to it.    PLAN:  1.  Follow-up radiology interpretation of his PET scan.  2.  Assuming this is negative I will see him back in 4 months for follow-up with CT imaging of the chest abdomen and pelvis, MRI brain, CBC, CMP done prior to that.      Albin Hummel MD

## 2020-01-14 ENCOUNTER — TELEPHONE (OUTPATIENT)
Dept: ONCOLOGY | Facility: CLINIC | Age: 65
End: 2020-01-14

## 2020-01-14 NOTE — TELEPHONE ENCOUNTER
----- Message from Albin Hummel MD sent at 1/13/2020 10:43 PM EST -----  Please call the patient regarding his result.  Please let him know that his PET scan is indeed normal/negative as I told him it was.  I didn't have the radiologist interpretation when I saw him.  Thanks, ROCIO

## 2020-02-19 ENCOUNTER — TELEPHONE (OUTPATIENT)
Dept: ONCOLOGY | Facility: CLINIC | Age: 65
End: 2020-02-19

## 2020-02-19 RX ORDER — PREDNISONE 10 MG/1
TABLET ORAL
Qty: 30 TABLET | Refills: 12 | OUTPATIENT
Start: 2020-02-19

## 2020-02-19 NOTE — TELEPHONE ENCOUNTER
Prednisone denied. Per conversation with Mecca NP. Pt's wife states that pt only takes pred for RA. We do not prescribe pred for that Dx. Mecca explained that to pt wife. We used to prescribe the pred for side effects for Opdivo which is prob where the confusion lies. BUT this RX is denied due to the Dx of RA,

## 2020-02-19 NOTE — TELEPHONE ENCOUNTER
We received a request for refills of this patient's prednisone.  I called the patient's wife, Mrs. Cunningham, to discuss the need for prednisone.  I explained that we prescribed prednisone in the fall 2019 for suspected pneumonitis related to immunotherapy.  He also takes prednisone for his rheumatoid arthritis.  She states that he is not symptomatic today and she does have a good communication with his rheumatologist.  She will therefore call the rheumatologist when she is in need of prednisone.  We will not need to refill his prednisone at this point.  I have asked her to call the office should she have questions or he develop new symptoms.

## 2020-04-27 ENCOUNTER — HOSPITAL ENCOUNTER (OUTPATIENT)
Dept: CT IMAGING | Facility: HOSPITAL | Age: 65
Discharge: HOME | End: 2020-04-27
Admitting: INTERNAL MEDICINE

## 2020-04-27 ENCOUNTER — HOSPITAL ENCOUNTER (OUTPATIENT)
Dept: MRI IMAGING | Facility: HOSPITAL | Age: 65
Discharge: HOME | End: 2020-04-27

## 2020-04-27 DIAGNOSIS — C79.31 BRAIN METASTASES: ICD-10-CM

## 2020-04-27 DIAGNOSIS — C78.7 METASTATIC MELANOMA TO LIVER (HCC): ICD-10-CM

## 2020-04-27 LAB
ALBUMIN SERPL-MCNC: 4.2 G/DL (ref 3.5–5.2)
ALBUMIN/GLOB SERPL: 1.4 G/DL
ALP SERPL-CCNC: 74 U/L (ref 39–117)
ALT SERPL W P-5'-P-CCNC: 22 U/L (ref 1–41)
ANION GAP SERPL CALCULATED.3IONS-SCNC: 12.4 MMOL/L (ref 5–15)
AST SERPL-CCNC: 22 U/L (ref 1–40)
BILIRUB SERPL-MCNC: 0.3 MG/DL (ref 0.2–1.2)
BUN BLD-MCNC: 12 MG/DL (ref 8–23)
BUN/CREAT SERPL: 9.9 (ref 7–25)
CALCIUM SPEC-SCNC: 9.2 MG/DL (ref 8.6–10.5)
CHLORIDE SERPL-SCNC: 100 MMOL/L (ref 98–107)
CO2 SERPL-SCNC: 26.6 MMOL/L (ref 22–29)
CREAT BLD-MCNC: 1.21 MG/DL (ref 0.76–1.27)
CREAT BLDA-MCNC: 1.2 MG/DL (ref 0.6–1.3)
GFR SERPL CREATININE-BSD FRML MDRD: 61 ML/MIN/1.73
GLOBULIN UR ELPH-MCNC: 3.1 GM/DL
GLUCOSE BLD-MCNC: 97 MG/DL (ref 65–99)
POTASSIUM BLD-SCNC: 3.8 MMOL/L (ref 3.5–5.2)
PROT SERPL-MCNC: 7.3 G/DL (ref 6–8.5)
SODIUM BLD-SCNC: 139 MMOL/L (ref 136–145)

## 2020-04-27 PROCEDURE — 71260 CT THORAX DX C+: CPT

## 2020-04-27 PROCEDURE — 0 GADOBENATE DIMEGLUMINE 529 MG/ML SOLUTION: Performed by: INTERNAL MEDICINE

## 2020-04-27 PROCEDURE — 0 DIATRIZOATE MEGLUMINE & SODIUM PER 1 ML: Performed by: INTERNAL MEDICINE

## 2020-04-27 PROCEDURE — 25010000002 IOPAMIDOL 61 % SOLUTION: Performed by: INTERNAL MEDICINE

## 2020-04-27 PROCEDURE — 80053 COMPREHEN METABOLIC PANEL: CPT | Performed by: INTERNAL MEDICINE

## 2020-04-27 PROCEDURE — 70553 MRI BRAIN STEM W/O & W/DYE: CPT

## 2020-04-27 PROCEDURE — 74177 CT ABD & PELVIS W/CONTRAST: CPT

## 2020-04-27 PROCEDURE — A9577 INJ MULTIHANCE: HCPCS | Performed by: INTERNAL MEDICINE

## 2020-04-27 PROCEDURE — 82565 ASSAY OF CREATININE: CPT

## 2020-04-27 PROCEDURE — 36415 COLL VENOUS BLD VENIPUNCTURE: CPT

## 2020-04-27 RX ADMIN — IOPAMIDOL 85 ML: 612 INJECTION, SOLUTION INTRAVENOUS at 10:26

## 2020-04-27 RX ADMIN — DIATRIZOATE MEGLUMINE AND DIATRIZOATE SODIUM 30 ML: 600; 100 SOLUTION ORAL; RECTAL at 09:30

## 2020-04-27 RX ADMIN — GADOBENATE DIMEGLUMINE 18 ML: 529 INJECTION, SOLUTION INTRAVENOUS at 11:02

## 2020-04-30 ENCOUNTER — TELEPHONE (OUTPATIENT)
Dept: ONCOLOGY | Facility: CLINIC | Age: 65
End: 2020-04-30

## 2020-04-30 NOTE — TELEPHONE ENCOUNTER
"Patient called to let Harriett know that he has [employer] setup and letting him do \"echeckin and everything\". Said to callback if needs anything else.    Callback: [phone]    "

## 2020-05-04 ENCOUNTER — APPOINTMENT (OUTPATIENT)
Dept: LAB | Facility: HOSPITAL | Age: 65
End: 2020-05-04

## 2020-05-04 ENCOUNTER — TELEMEDICINE (OUTPATIENT)
Dept: ONCOLOGY | Facility: CLINIC | Age: 65
End: 2020-05-04

## 2020-05-04 DIAGNOSIS — C79.31 BRAIN METASTASES: ICD-10-CM

## 2020-05-04 DIAGNOSIS — C78.7 METASTATIC MELANOMA TO LIVER (HCC): Primary | ICD-10-CM

## 2020-05-04 PROCEDURE — 99215 OFFICE O/P EST HI 40 MIN: CPT | Performed by: INTERNAL MEDICINE

## 2020-05-04 NOTE — PROGRESS NOTES
[facility] GROUP OUTPATIENT FOLLOW UP CLINIC VISIT    REASON FOR FOLLOW-UP:    1.  Metastatic BRAF/NRAS mutation negative melanoma with metastatic disease present in the lung, liver bone, and brain at diagnosis.  2.  History of rheumatoid arthritis on therapy with Rituxan  3.  Right upper quadrant abdominal pain secondary to metastatic melanoma  4.  He was seen by Dr. Islas at [facility] with recommendations for ipi/nivo following stereotactic radiosurgery for his brain  5.  He was seen by Dr. Rosado with radiation oncology with plans for stereotactic radiosurgery to his 2 brain lesions with treatment planning initiated on 3/8/2019.  Stereotactic treatment completed from 3/13/2019 through 3/22/2019.  1 fraction at 2400 cGy administered to the right parietal lesion in 5 fractions to 3000 cGy administered to the left frontal lesion.  6. Opdivo Yervoy initiated on 3/20/2019.  7.  He was seen on 3/28/2019 with 2 rashes one consistent with a drug rash and the other consistent with herpes zoster.  8.  Acute hepatitis secondary to immunotherapy after cycle #2.  Treated with steroids.  9.  He completed 3 cycles of combination therapy.  A PET scan on 6/24/2019 shows essentially complete response to therapy.  10.  Proceed with maintenance Opdivo every 4 weeks as of 6/27/2019.  11. Opdivo discontinued secondary to poor tolerance 7/25/2019.    HISTORY OF PRESENT ILLNESS:  Gelacio Cunningham is a 65 y.o. male with the above-mentioned history here today for scheduled follow-up to review imaging.    He continues to have tinnitus.  He has occasional flair ups of arthralgias.  Energy level is excellent.  Occasional frontal headaches.  No other neurologic symptoms.        ONCOLOGIC HISTORY:  The patient has a past medical history significant for hypertension and rheumatoid arthritis.  For his rheumatoid arthritis he has been treated with a number of agents in the past including methotrexate, Enbrel,  Humira, and more recently rituximab which was started around 1-1/2 years ago with his most recent treatment administered around one month ago.  He has some chronic diffuse arthralgias which are reasonably well-controlled on rituximab.  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  This was diagnosed initially by his dermatologist Dr. Aggarwal.  He was referred to Dr. Vega and underwent surgery at [facility] in late 2017 with wide resection of the lesion and sentinel lymph node biopsy.  The patient reports that the margin was positive from the resection however lymph nodes were negative.  He had a second surgery with negative margins.  He did not receive any adjuvant therapy.     Recently, the patient developed symptoms around 2 weeks ago with mild nausea and loss of appetite.  He then developed around 1 week ago right-sided abdominal pain.  He presented to his primary care physician and underwent a right upper quadrant ultrasound on 1/24/19 showing multiple liver masses the largest of which measured 7.7 cm in the right hepatic lobe.  He was admitted and underwent CT of the abdomen and pelvis on 1/24/19 which confirmed multiple hepatic masses worrisome for metastatic disease with the largest lesion in the right lobe measuring 8.3 cm.  There was a 1 cm sclerotic nodule in the left sacral ala which was possibly related to a bone island.  There was a subtle area of narrowing around the mid transverse colon which was felt to possibly be related to wall thickening.  General surgery has been consulted and there are plans to pursue colonoscopy tomorrow.  He has undergone prior colonoscopy 12/23/16 with a single right-sided polyp removed.  He has now undergone CT chest 1/25/19 which showed 3 small pulmonary nodules in the left upper lobe measuring up to 9 mm which are indeterminate and a small 1 cm nodule in the left lower lobe which may be focal atelectasis.  There were some trace  bilateral pleural effusions.  The patient did undergo CT-guided liver biopsy 1/25/19 with pathology non-diagnostic.    He was discharged from the hospital.  He subsequently had a PET scan on 2/1/2019 which allowed us to target a different liver lesion that was not necrotic.  He did have a liver biopsy on 2/8/2019 with results consistent with metastatic melanoma.  The biopsy also showed evidence for acute cholangitis, increased portal fibrosis with mixed inflammation with lymphocytes and eosinophils as well as sinusoidal dilatation consistent with cardiac etiology.    A brain MRI on 2/25/2019 showed 2 brain lesions consistent with metastatic disease.  The largest is in the left frontal lobe measuring 3.8 cm with some evidence for hemorrhage and some vasogenic edema.  There is a smaller 5-6 mm lesion in the right parietal lobe.  He was seen by Dr. Rosado with plans for stereotactic radiosurgery with planning on 3/8/2019.    He completed radiation.    He completed 3 cycles of Opdivo and Yervoy complicated by hepatitis and extensive drug rash.  These resolved easily with high-dose prednisone.    MRI of the brain on 5/22/2019 shows a good response to radiation.    PET scan on 6/24/2019 shows essentially complete response to therapy.    As of 6/27/2019 proceed with maintenance Opdivo.  His last dose was on that day due to toxicity.        ALLERGIES:  Allergies   Allergen Reactions   • Sulindac Diarrhea       MEDICATIONS:  The medication list has been reviewed with the patient by the medical assistant, and the list has been updated in the electronic medical record, which I reviewed.  Medication dosages and frequencies were confirmed to be accurate.    I have reviewed the patient's medical history in detail and updated the computerized patient record.    Review of Systems   Constitutional: Negative for appetite change, chills, diaphoresis, fatigue, fever and unexpected weight change.   HENT:   Positive for tinnitus.  Negative for trouble swallowing.    Respiratory: Negative for cough and shortness of breath.    Cardiovascular: Negative for chest pain.   Gastrointestinal: Negative for abdominal pain (resolved), blood in stool, constipation, diarrhea and nausea.   Musculoskeletal: Positive for arthralgias (come and go).   Skin: Negative for itching.   Neurological: Negative for dizziness and extremity weakness.   Hematological: Does not bruise/bleed easily.   Psychiatric/Behavioral: Positive for sleep disturbance (improved).   Review of systems unchanged from previous office visit except as updated      There were no vitals filed for this visit.    PHYSICAL EXAMINATION:  Physical Exam   Constitutional: He appears well-developed and well-nourished.   HENT:   Head: Normocephalic and atraumatic.   Pulmonary/Chest: Effort normal.  No respiratory distress.  Musculoskeletal:         General: No edema.   Neurological: He is alert.   Skin: Skin is warm and dry.   Psychiatric: He has a normal mood and affect.         DIAGNOSTIC DATA:      Results from last 7 days   Lab Units 04/27/20  0951 04/27/20  0948   SODIUM mmol/L  --  139   POTASSIUM mmol/L  --  3.8   CHLORIDE mmol/L  --  100   CO2 mmol/L  --  26.6   BUN mg/dL  --  12   CREATININE mg/dL 1.20 1.21   CALCIUM mg/dL  --  9.2   ALBUMIN g/dL  --  4.20   BILIRUBIN mg/dL  --  0.3   ALK PHOS U/L  --  74   ALT (SGPT) U/L  --  22   AST (SGOT) U/L  --  22   GLUCOSE mg/dL  --  97           IMAGING:     CT C/A/P 4/27:  Images personally reviewed.  No evidence for progression.      MRI 4/27/2020:  The left frontal mass is the same size.  There is new signal abnormality and enhancement along the medial and superior aspects consistent with recurrence.        ASSESSMENT:  This is a 65 y.o. male with:  1. Metastatic melanoma:  · Patient with recent onset of symptoms including nausea, anorexia, right-sided abdominal pain over the 2 weeks preceeding admission in January 2019  · Abnormal right upper  quadrant ultrasound showing multiple liver masses 1/24/19.  · CT abdomen and pelvis 1/24/19 confirmed multiple hepatic masses worrisome for metastatic disease with the largest lesion in the right lobe measuring 8.3 cm.  There was a 1 cm sclerotic nodule in the left sacral ala which was possibly related to a bone island.  There was a subtle area of narrowing around the mid transverse colon which was felt to possibly be related to wall thickening.  · CT chest 1/25/19 showed 3 small pulmonary nodules in the left upper lobe measuring up to 9 mm which are indeterminate and a small 1 cm nodule in the left lower lobe which may be focal atelectasis.  There were some trace bilateral pleural effusions.   · CT-guided liver biopsy 1/25/19 with pathology only showing blood.   · Colonoscopy by Dr. Rashid on 1/27 negative.  CEA 1/25/19 was normal at 1.4.   · Given the patient's history of melanoma in late 2017, there was concern regarding possible metastatic melanoma as well.  · Colonoscopy 1/27/2019 negative for malignancy.  · Discussed with Dr. Richardson with radiology.  Outpatient PET with repeat liver biopsy based on these results  · PET performed 2/1/2019  · Repeat liver biopsy on 2/8/2019 confirms metastatic melanoma with evidence also for sinusoidal dilatation consistent with cardiac etiology, increased portal fibrosis with mixed inflammation with lymphocytes and eosinophils, and acute cholangitis.  · BRAF/NRAS mutation negative  · Brain MRI on 2/25/2019 with 2 metastatic lesions.  One measures 3.8 cm in the left frontal lobe and the other measures 5-6 mm in the right parietal lobe.   · Stereotactic radiosurgery to be completed this Friday, March 22, 2019.    · Immunotherapy initiated 3/20/2019 with Brandee Jaquez  · Rash following cycle #1.  2 rashes present one on his trunk and one on the left arm which appeared different and more vesicular consistent with zoster.  He was treated with a Medrol Dosepak and Valtrex with  prompt resolution of his symptoms.  · Cycle 2 initiated on 4/10/2019.  Plan 4 cycles followed by imaging followed by maintenance Opdivo assuming a good response.  · Fever and night sweats following cycle #2 of therapy.  Cultures negative.  These have dissipated prior to cycle #3.  · Cycle #3 of therapy held due to acute hepatitis.  · Treated with steroids.  Prednisone finished 5/28/2019.  Liver labs had been improving.  · 3 cycles of therapy ultimately completed.  Complicated by significant rash.  Improved with steroids.  · PET scan 6/24/2019 with essentially complete response to therapy.  · As of 6/27/2019, proceed with maintenance Opdivo every 4 weeks.  · July 25, 2019 for scheduled Opdivo.  His performance status continues to decline with each treatment.  · He is seen August 6 2019  with reports of persistent fatigue, abdominal pain, decreased appetite and nausea.  · Symptoms significantly improved with addition of prednisone 10 mg  · Final Opdivo administered on 6/27/2019.  · CT imaging on 10/15/2019 without evidence for progression  · Whole-body PET scan 1/10/2020 as of 1/13/2020 negative.  MRI brain 1/9/2020 without evidence for progression.  · CT C/A/P 4/27/2020 no recurrence, decrease in liver metastases, stable bony findings  · MRI brain 4/27/2020 with some new enhancement along the periphery of the left frontal brain lesion.  He is asymptomatic.  He does not want WBRT.      2. Rheumatoid arthritis:  · The patient has long-standing disease treated in the past with methotrexate, Enbrel, Humira, and more recently rituximab which was started around 1-1/2 years ago with his most recent treatment administered around late December 2018.    · He has chronic diffuse arthralgias which are reasonably well-controlled on rituximab.  · As above, as metastatic melanoma is identified, this issue may complicate use of immunotherapy.  · Symptoms come and go.    3. History of cutaneous melanoma of the right ear:  · Diagnosed  initially by his dermatologist Dr. Aggarwal, appears to have been in the pre-auricular region.  Pathology reviewed from initial biopsy showing Breslow 0.82 mm, non-ulcerated lesion with low mitotic rate.  · He was referred to Dr. Vega and underwent surgery at [facility] in December 2017 with wide resection of the lesion and sentinel lymph node biopsy.  We do not yet have that pathology report however per notes available, there was a residual area of melanoma and sentinel nodes were negative.  He therefore underwent a second procedure for wide excision with presumably negative margins.     · He did not receive any adjuvant therapy.    4. Rash secondary to immunotherapy.  · 1 week following the initiation of Yervoy and Opdivo, the patient has developed a pruritic diffuse rash on his trunk, chest and back.  Unresponsive to OTC topical steroids.  · Treated with a medrol dose joel with prompt resolution  · Treated with high-dose steroids then after cycle #3 of therapy with improvement.  · Resolved.    5. Suspected varicella-zoster rash of the left upper inner arm.  · Along with the development of immune mediated dermatitis, the patient also developed suspected zoster of his left upper inner arm.  This appeared very different in appearance from his immune mediated drug rash with linear vesicles.   · He was treated with Valtrex 1 g 3 times daily times 1 week with prompt resolution.  Unclear if this was really different than the other rash or not with prompt resolution and no pain involved.    6.  Pyrexia and diaphoresis: Likely secondary to immunotherapy.  Cultures were negative.  Not currently an issue.    7.  Brain metastases: Status post stereotactic radiosurgery.  Repeat MRI of the brain on 5/22/2019 with improvement.  Stable MRI 11/28/2019.  New changes 4/27/2020.      8.  Acute hepatitis related to Opdivo and Yervoy.  Viral hepatitis studies negative.  Treated with steroids with resolution.    10.   Acute onset of abdominal discomfort that progressed with Opdivo treatments.  He was evaluated with CT scan last visit which revealed no acute abnormality.    11. Insomnia.  Improved with Restoril.    12.  Tinnitus: Unclear etiology.  MRI auditory canal showed no abnormality.  Consider referral to audiology but he states he is getting used to it.    PLAN:  1.  I discussed the brain MRI with Dr. Rosado with radiation oncology.  No further SRS.  He would offer WBRT.  The patient declines this.  Given that his disease in the liver is improving, I'll speak to Dr. Mcdonough who saw him before about his back to see if resection would be an option.  He poorly tolerated Opdivo in the past.  Keytruda would be a consideration, or a clinical trial.  Will d/w Dr. Islas at [place] who saw him in the past.      Follow up to be determined based on these discussions.      40 minutes.  21 minutes spent with the patient and his wife discussing options for treatment of his brain lesion.  I showed them the MRI images.       ADDENDUM:  Communicated with Dr. Mcdonough and Dr. Islas at [place].  Dr. Mcdonough is willing to see him to consider resection.  I advised Mr. Cunningham of this and he is willing to go see him to discuss this.  Referral made, and I gave Mr. Cunningham the phone number to Dr. Mcdonough' office as well.      Albin Hummel MD

## 2020-05-05 ENCOUNTER — PREP FOR SURGERY (OUTPATIENT)
Dept: OTHER | Facility: HOSPITAL | Age: 65
End: 2020-05-05

## 2020-05-05 ENCOUNTER — OFFICE VISIT (OUTPATIENT)
Dept: NEUROSURGERY | Facility: CLINIC | Age: 65
End: 2020-05-05

## 2020-05-05 DIAGNOSIS — C79.31 BRAIN METASTASES: Primary | ICD-10-CM

## 2020-05-05 PROCEDURE — 99213 OFFICE O/P EST LOW 20 MIN: CPT | Performed by: NEUROLOGICAL SURGERY

## 2020-05-05 RX ORDER — CEFAZOLIN SODIUM 2 G/100ML
2 INJECTION, SOLUTION INTRAVENOUS ONCE
Status: CANCELLED | OUTPATIENT
Start: 2020-05-11 | End: 2020-05-05

## 2020-05-05 NOTE — H&P (VIEW-ONLY)
Subjective   History of Present Illness: Gelacio Cunningham is a 65 y.o. male is here today for follow-up.    You have chosen to receive care through a telephone visit. Do you consent to use a telephone visit for your medical care today? Yes    We had to do a telephone visit because we could not arrange a video visit.  The patient was at home and I was at an office in the hospital.  We talked for 5 minutes.    History of Present Illness    This patient has a history of malignant melanoma.  He has been doing okay but recently started having some headaches.  I had seen him previously for low back pain.  I was contacted by his oncologist because a follow-up MRI of his brain showed an abnormality.  The patient really does not have any other abnormalities at all.  He is normally right-handed.  He has no change in vision, no weakness, no numbness or tingling anywhere.  The remainder of his work-up actually looks pretty good and his malignant melanoma appears to be under pretty good control.    The following portions of the patient's history were reviewed and updated as appropriate: allergies, current medications, past family history, past medical history, past social history, past surgical history and problem list.    Review of Systems   Eyes: Negative for visual disturbance.   Respiratory: Negative for chest tightness and shortness of breath.    Cardiovascular: Negative for chest pain.   Gastrointestinal: Negative for abdominal distention.       Objective       Physical Exam   Constitutional: He is oriented to person, place, and time.   Neurological: He is oriented to person, place, and time.     Neurologic Exam     Mental Status   Oriented to person, place, and time.           Assessment/Plan   Independent Review of Radiographic Studies:      I personally reviewed the images from the following studies.    I reviewed his MRI which was done on 27 April.  This does show a enhancing mass in the left frontal pole.  He is  previously had radiation to that using stereotactic technique.  Whole brain radiation was offered but the patient declined.    Medical Decision Making:      I had a long discussion with the patient and his wife about the situation.  I explained that this abnormality could be removed with a relatively low risk.  I suggested a left frontal craniotomy through either a coronal incision or a left frontotemporal incision.  He does not have much hair and so it does not make all that much difference which one would use.  I told him that there was a 2 or 3% chance of infection, bleeding, CSF leak, as well as anesthetic risk.  There is always a risk of various neurologic deficits which cannot definitively be described but I do not think those risk are very great here and I would say less than 1 or 2%.  Possibly the greatest risk is just the fact that he could lose his smell and taste.  Discussed the postoperative hospital and home course.  He does wish to proceed.    He will need to be scheduled for a: Left frontal craniotomy for tumor using stereotactic guidance  Gelacio was seen today for follow-up.    Diagnoses and all orders for this visit:    Brain metastases (CMS/HCC)      Return for 2-3 week post op.

## 2020-05-06 DIAGNOSIS — C79.31 BRAIN METASTASES: Primary | ICD-10-CM

## 2020-05-06 RX ORDER — NEBIVOLOL HYDROCHLORIDE 5 MG/1
TABLET ORAL
Qty: 90 TABLET | Refills: 3 | OUTPATIENT
Start: 2020-05-06

## 2020-05-08 ENCOUNTER — HOSPITAL ENCOUNTER (OUTPATIENT)
Dept: CT IMAGING | Facility: HOSPITAL | Age: 65
Discharge: HOME | End: 2020-05-08
Admitting: NEUROLOGICAL SURGERY

## 2020-05-08 ENCOUNTER — APPOINTMENT (OUTPATIENT)
Dept: PREADMISSION TESTING | Facility: HOSPITAL | Age: 65
End: 2020-05-08

## 2020-05-08 VITALS
SYSTOLIC BLOOD PRESSURE: 137 MMHG | DIASTOLIC BLOOD PRESSURE: 80 MMHG | HEIGHT: 68 IN | HEART RATE: 64 BPM | BODY MASS INDEX: 30.31 KG/M2 | RESPIRATION RATE: 18 BRPM | WEIGHT: 200 LBS | OXYGEN SATURATION: 96 % | TEMPERATURE: 97.8 F

## 2020-05-08 DIAGNOSIS — C79.31 BRAIN METASTASES: ICD-10-CM

## 2020-05-08 LAB
ABO GROUP BLD: NORMAL
BASOPHILS # BLD AUTO: 0.02 10*3/MM3 (ref 0–0.2)
BASOPHILS NFR BLD AUTO: 0.3 % (ref 0–1.5)
BLD GP AB SCN SERPL QL: POSITIVE
CREAT BLDA-MCNC: 1.1 MG/DL (ref 0.6–1.3)
DEPRECATED RDW RBC AUTO: 39.9 FL (ref 37–54)
EOSINOPHIL # BLD AUTO: 0.24 10*3/MM3 (ref 0–0.4)
EOSINOPHIL NFR BLD AUTO: 3.6 % (ref 0.3–6.2)
ERYTHROCYTE [DISTWIDTH] IN BLOOD BY AUTOMATED COUNT: 12.9 % (ref 12.3–15.4)
HCT VFR BLD AUTO: 40 % (ref 37.5–51)
HGB BLD-MCNC: 13.8 G/DL (ref 13–17.7)
IMM GRANULOCYTES # BLD AUTO: 0.14 10*3/MM3 (ref 0–0.05)
IMM GRANULOCYTES NFR BLD AUTO: 2.1 % (ref 0–0.5)
LYMPHOCYTES # BLD AUTO: 1.52 10*3/MM3 (ref 0.7–3.1)
LYMPHOCYTES NFR BLD AUTO: 22.7 % (ref 19.6–45.3)
MCH RBC QN AUTO: 29.7 PG (ref 26.6–33)
MCHC RBC AUTO-ENTMCNC: 34.5 G/DL (ref 31.5–35.7)
MCV RBC AUTO: 86.2 FL (ref 79–97)
MONOCYTES # BLD AUTO: 0.57 10*3/MM3 (ref 0.1–0.9)
MONOCYTES NFR BLD AUTO: 8.5 % (ref 5–12)
NEUTROPHILS # BLD AUTO: 4.21 10*3/MM3 (ref 1.7–7)
NEUTROPHILS NFR BLD AUTO: 62.8 % (ref 42.7–76)
NRBC BLD AUTO-RTO: 0 /100 WBC (ref 0–0.2)
PLATELET # BLD AUTO: 240 10*3/MM3 (ref 140–450)
PMV BLD AUTO: 10 FL (ref 6–12)
PRESERVATIVE ANTIBODY: NORMAL
RBC # BLD AUTO: 4.64 10*6/MM3 (ref 4.14–5.8)
RH BLD: POSITIVE
T&S EXPIRATION DATE: NORMAL
WBC NRBC COR # BLD: 6.7 10*3/MM3 (ref 3.4–10.8)

## 2020-05-08 PROCEDURE — 86922 COMPATIBILITY TEST ANTIGLOB: CPT

## 2020-05-08 PROCEDURE — 86900 BLOOD TYPING SEROLOGIC ABO: CPT | Performed by: NEUROLOGICAL SURGERY

## 2020-05-08 PROCEDURE — 86920 COMPATIBILITY TEST SPIN: CPT

## 2020-05-08 PROCEDURE — 86901 BLOOD TYPING SEROLOGIC RH(D): CPT | Performed by: NEUROLOGICAL SURGERY

## 2020-05-08 PROCEDURE — 36415 COLL VENOUS BLD VENIPUNCTURE: CPT

## 2020-05-08 PROCEDURE — 86870 RBC ANTIBODY IDENTIFICATION: CPT | Performed by: NEUROLOGICAL SURGERY

## 2020-05-08 PROCEDURE — 86850 RBC ANTIBODY SCREEN: CPT | Performed by: NEUROLOGICAL SURGERY

## 2020-05-08 PROCEDURE — 82565 ASSAY OF CREATININE: CPT

## 2020-05-08 PROCEDURE — 86850 RBC ANTIBODY SCREEN: CPT

## 2020-05-08 PROCEDURE — 93005 ELECTROCARDIOGRAM TRACING: CPT

## 2020-05-08 PROCEDURE — U0004 COV-19 TEST NON-CDC HGH THRU: HCPCS | Performed by: NURSE PRACTITIONER

## 2020-05-08 PROCEDURE — 85025 COMPLETE CBC W/AUTO DIFF WBC: CPT | Performed by: NEUROLOGICAL SURGERY

## 2020-05-08 PROCEDURE — 25010000002 IOPAMIDOL 61 % SOLUTION: Performed by: NEUROLOGICAL SURGERY

## 2020-05-08 PROCEDURE — 70470 CT HEAD/BRAIN W/O & W/DYE: CPT

## 2020-05-08 PROCEDURE — 93010 ELECTROCARDIOGRAM REPORT: CPT | Performed by: INTERNAL MEDICINE

## 2020-05-08 RX ORDER — TEMAZEPAM 15 MG/1
15 CAPSULE ORAL NIGHTLY PRN
COMMUNITY
End: 2020-06-02

## 2020-05-08 RX ORDER — CHLORHEXIDINE GLUCONATE 500 MG/1
CLOTH TOPICAL
COMMUNITY
End: 2020-05-13 | Stop reason: HOSPADM

## 2020-05-08 RX ADMIN — IOPAMIDOL 85 ML: 612 INJECTION, SOLUTION INTRAVENOUS at 10:32

## 2020-05-08 NOTE — DISCHARGE INSTRUCTIONS
ARRIVE DAY OF SURGERY AT 7:30 AM TO MAIN SURGERY      Take the following medications the morning of surgery:    [facility]    General Instructions:  • Do not eat solid food after midnight the night before surgery.  • You may drink clear liquids day of surgery but must stop at least one hour before your hospital arrival time.  • It is beneficial for you to have a clear drink that contains carbohydrates the day of surgery.  We suggest a 12 to 20 ounce bottle of Gatorade or Powerade for non-diabetic patients or a 12 to 20 ounce bottle of G2 or Powerade Zero for diabetic patients. (Pediatric patients, are not advised to drink a 12 to 20 ounce carbohydrate drink)    Clear liquids are liquids you can see through.  Nothing red in color.     Plain water                               Sports drinks  Sodas                                   Gelatin (Jell-O)  Fruit juices without pulp such as white grape juice and apple juice  Popsicles that contain no fruit or yogurt  Tea or coffee (no cream or milk added)  Gatorade / Powerade  G2 / Powerade Zero    • Infants may have breast milk up to four hours before surgery.  • Infants drinking formula may drink formula up to six hours before surgery.   • Patients who avoid smoking, chewing tobacco and alcohol for 4 weeks prior to surgery have a reduced risk of post-operative complications.  Quit smoking as many days before surgery as you can.  • Do not smoke, use chewing tobacco or drink alcohol the day of surgery.   • If applicable bring your C-PAP/ BI-PAP machine.  • Bring any papers given to you in the doctor’s office.  • Wear clean comfortable clothes.  • Do not wear contact lenses, false eyelashes or make-up.  Bring a case for your glasses.   • Bring crutches or walker if applicable.  • Remove all piercings.  Leave jewelry and any other valuables at home.  • Hair extensions with metal clips must be removed prior to surgery.  • The Pre-Admission Testing nurse will instruct you to bring  medications if unable to obtain an accurate list in Pre-Admission Testing.        If you were given a blood bank ID arm band remember to bring it with you the day of surgery.    Preventing a Surgical Site Infection:  • For 2 to 3 days before surgery, avoid shaving with a razor because the razor can irritate skin and make it easier to develop an infection.    • Any areas of open skin can increase the risk of a post-operative wound infection by allowing bacteria to enter and travel throughout the body.  Notify your surgeon if you have any skin wounds / rashes even if it is not near the expected surgical site.  The area will need assessed to determine if surgery should be delayed until it is healed.  • The night prior to surgery shower using a fresh bar of anti-bacterial soap (such as Dial) and clean washcloth.  Sleep in a clean bed with clean clothing.  Do not allow pets to sleep with you.  • Shower on the morning of surgery using a fresh bar of anti-bacterial soap (such as Dial) and clean washcloth.  Dry with a clean towel and dress in clean clothing.  • Ask your surgeon if you will be receiving antibiotics prior to surgery.  • Make sure you, your family, and all healthcare providers clean their hands with soap and water or an alcohol based hand  before caring for you or your wound.    Day of surgery:  Your arrival time is approximately two hours before your scheduled surgery time.  Upon arrival, a Pre-op nurse and Anesthesiologist will review your health history, obtain vital signs, and answer questions you may have.  The only belongings needed at this time will be a list of your home medications and if applicable your C-PAP/BI-PAP machine.  If you are staying overnight your family can leave the rest of your belongings in the car and bring them to your room later.  A Pre-op nurse will start an IV and you may receive medication in preparation for surgery, including something to help you relax.  Your family  will be able to see you in the Pre-op area.  Two visitors at a time will be allowed in the Pre-op room.  While you are in surgery your family should notify the waiting room  if they leave the waiting room area and provide a contact phone number.    Please be aware that surgery does come with discomfort.  We want to make every effort to control your discomfort so please discuss any uncontrolled symptoms with your nurse.   Your doctor will most likely have prescribed pain medications.      If you are going home after surgery you will receive individualized written care instructions before being discharged.  A responsible adult must drive you to and from the hospital on the day of your surgery and stay with you for 24 hours.    If you are staying overnight following surgery, you will be transported to your hospital room following the recovery period.  [facility] has all private rooms.    If you have any questions please call Pre-Admission Testing at [phone].  Deductibles and co-payments are collected on the day of service. Please be prepared to pay the required co-pay, deductible or deposit on the day of service as defined by your plan.    Self-Quarantine Prior to Surgery    • Stay at home. Do not leave your home after you have been given the Covid test for your upcoming surgery.   • Wash your hands often with soap and water for at least 20 seconds especially after you have been in a public place, or after blowing your nose, coughing, or sneezing.  • If soap and water are not readily available, use a hand  that contains at least 60% alcohol. Cover all surfaces of your hands and rub them together until they feel dry.  • Avoid touching your eyes, nose, and mouth with unwashed hands.  • Take everyday precautions to keep space between yourself and others (stay 6 feet away, which is about two arm lengths).  Remember that some people without symptoms may be able to spread  virus.  • You should stay in a specific room away from others if possible.  Stay at least 6 feet away from others in the home if you cannot have a dedicated room to yourself. Do not have visitors.   • Wear a mask around others in your home if you are unable to stay in a separate room or 6 feet apart. If you are unable to wear a mask, have your family member wear a mask if they must be within 6 feet of you.   • Do not snuggle with your pet. While the CDC says there is no evidence that pets can spread COVID-19 or be infected from humans, it is probably best to avoid “petting, snuggling, being kissed or licked and sharing food (during self-quarantine)”, according to the CDC.   • Do not share anything - Have your own utensils, drinking glass, dishes, towels and bedding.   • Do not share a bathroom with others, if possible.   • Clean and disinfect frequently touched services daily. This includes tables, doorknobs, light switches, countertops, handles, desks, phones, keyboards, toilets, faucets, and sinks.  • Do not travel prior to surgery.    Call your surgeon immediately if you experience any of the following symptoms:  • Sore Throat      • Shortness of Breath or difficulty breathing  • Cough  • Chills  • Body soreness or muscle pain  • Headache  • Fever  • New loss of taste or smell  • Do not arrive for your surgery ill.  Your procedure will need to be rescheduled to another time.  You will need to call your physician before the day of surgery to avoid any unnecessary exposure to hospital staff as well as other patients.        CHLORHEXIDINE CLOTH INSTRUCTIONS  The morning of surgery follow these instructions using the Chlorhexidine cloths you've been given.  These steps reduce bacteria on the body.  Do not use the cloths near your eyes, ears mouth, genitalia or on open wounds.  Throw the cloths away after use but do not try to flush them down a toilet.      • Open and remove one cloth at a time from the package.     • Leave the cloth unfolded and begin the bathing.  • Massage the skin with the cloths using gentle pressure to remove bacteria.  Do not scrub harshly.   • Follow the steps below with one 2% CHG cloth per area (6 total cloths).  • One cloth for neck, shoulders and chest.  • One cloth for both arms, hands, fingers and underarms (do underarms last).  • One cloth for the abdomen followed by groin.  • One cloth for right leg and foot including between the toes.  • One cloth for left leg and foot including between the toes.  • The last cloth is to be used for the back of the neck, back and buttocks.    Allow the CHG to air dry 3 minutes on the skin which will give it time to work and decrease the chance of irritation.  The skin may feel sticky until it is dry.  Do not rinse with water or any other liquid or you will lose the beneficial effects of the CHG.  If mild skin irritation occurs, do rinse the skin to remove the CHG.  Report this to the nurse at time of admission.  Do not apply lotions, creams, ointments, deodorants or perfumes after using the clothes. Dress in clean clothes before coming to the hospital.

## 2020-05-09 LAB
REF LAB TEST METHOD: NORMAL
SARS-COV-2 RNA RESP QL NAA+PROBE: NOT DETECTED

## 2020-05-11 ENCOUNTER — ANESTHESIA EVENT (OUTPATIENT)
Dept: PERIOP | Facility: HOSPITAL | Age: 65
End: 2020-05-11

## 2020-05-11 ENCOUNTER — ANESTHESIA (OUTPATIENT)
Dept: PERIOP | Facility: HOSPITAL | Age: 65
End: 2020-05-11

## 2020-05-11 ENCOUNTER — HOSPITAL ENCOUNTER (INPATIENT)
Facility: HOSPITAL | Age: 65
LOS: 2 days | Discharge: HOME | End: 2020-05-13
Attending: NEUROLOGICAL SURGERY | Admitting: NEUROLOGICAL SURGERY

## 2020-05-11 DIAGNOSIS — C79.31 BRAIN METASTASES: ICD-10-CM

## 2020-05-11 LAB
ANION GAP SERPL CALCULATED.3IONS-SCNC: 12.2 MMOL/L (ref 5–15)
BASOPHILS # BLD AUTO: 0.02 10*3/MM3 (ref 0–0.2)
BASOPHILS NFR BLD AUTO: 0.2 % (ref 0–1.5)
BUN BLD-MCNC: 11 MG/DL (ref 8–23)
BUN/CREAT SERPL: 12.8 (ref 7–25)
CALCIUM SPEC-SCNC: 7.6 MG/DL (ref 8.6–10.5)
CHLORIDE SERPL-SCNC: 104 MMOL/L (ref 98–107)
CO2 SERPL-SCNC: 18.8 MMOL/L (ref 22–29)
CREAT BLD-MCNC: 0.86 MG/DL (ref 0.76–1.27)
DEPRECATED RDW RBC AUTO: 41 FL (ref 37–54)
EOSINOPHIL # BLD AUTO: 0 10*3/MM3 (ref 0–0.4)
EOSINOPHIL NFR BLD AUTO: 0 % (ref 0.3–6.2)
ERYTHROCYTE [DISTWIDTH] IN BLOOD BY AUTOMATED COUNT: 13 % (ref 12.3–15.4)
GFR SERPL CREATININE-BSD FRML MDRD: 90 ML/MIN/1.73
GLUCOSE BLD-MCNC: 123 MG/DL (ref 65–99)
HCT VFR BLD AUTO: 38 % (ref 37.5–51)
HGB BLD-MCNC: 13.2 G/DL (ref 13–17.7)
IMM GRANULOCYTES # BLD AUTO: 0.06 10*3/MM3 (ref 0–0.05)
IMM GRANULOCYTES NFR BLD AUTO: 0.7 % (ref 0–0.5)
LYMPHOCYTES # BLD AUTO: 0.42 10*3/MM3 (ref 0.7–3.1)
LYMPHOCYTES NFR BLD AUTO: 5.2 % (ref 19.6–45.3)
MCH RBC QN AUTO: 30.4 PG (ref 26.6–33)
MCHC RBC AUTO-ENTMCNC: 34.7 G/DL (ref 31.5–35.7)
MCV RBC AUTO: 87.6 FL (ref 79–97)
MONOCYTES # BLD AUTO: 0.07 10*3/MM3 (ref 0.1–0.9)
MONOCYTES NFR BLD AUTO: 0.9 % (ref 5–12)
NEUTROPHILS # BLD AUTO: 7.5 10*3/MM3 (ref 1.7–7)
NEUTROPHILS NFR BLD AUTO: 93 % (ref 42.7–76)
NRBC BLD AUTO-RTO: 0 /100 WBC (ref 0–0.2)
PLATELET # BLD AUTO: 198 10*3/MM3 (ref 140–450)
PMV BLD AUTO: 10.2 FL (ref 6–12)
POTASSIUM BLD-SCNC: 3.6 MMOL/L (ref 3.5–5.2)
RBC # BLD AUTO: 4.34 10*6/MM3 (ref 4.14–5.8)
SODIUM BLD-SCNC: 135 MMOL/L (ref 136–145)
WBC NRBC COR # BLD: 8.07 10*3/MM3 (ref 3.4–10.8)

## 2020-05-11 PROCEDURE — 80048 BASIC METABOLIC PNL TOTAL CA: CPT | Performed by: NEUROLOGICAL SURGERY

## 2020-05-11 PROCEDURE — 88307 TISSUE EXAM BY PATHOLOGIST: CPT | Performed by: NEUROLOGICAL SURGERY

## 2020-05-11 PROCEDURE — 00B70ZZ EXCISION OF CEREBRAL HEMISPHERE, OPEN APPROACH: ICD-10-PCS | Performed by: NEUROLOGICAL SURGERY

## 2020-05-11 PROCEDURE — 25810000003 SODIUM CHLORIDE 0.9 % WITH KCL 20 MEQ 20-0.9 MEQ/L-% SOLUTION: Performed by: NEUROLOGICAL SURGERY

## 2020-05-11 PROCEDURE — 88331 PATH CONSLTJ SURG 1 BLK 1SPC: CPT | Performed by: NEUROLOGICAL SURGERY

## 2020-05-11 PROCEDURE — 25010000003 CEFAZOLIN IN DEXTROSE 2-4 GM/100ML-% SOLUTION: Performed by: NEUROLOGICAL SURGERY

## 2020-05-11 PROCEDURE — C1713 ANCHOR/SCREW BN/BN,TIS/BN: HCPCS | Performed by: NEUROLOGICAL SURGERY

## 2020-05-11 PROCEDURE — 25010000002 MIDAZOLAM PER 1 MG: Performed by: ANESTHESIOLOGY

## 2020-05-11 PROCEDURE — 25010000002 FENTANYL CITRATE (PF) 100 MCG/2ML SOLUTION: Performed by: ANESTHESIOLOGY

## 2020-05-11 PROCEDURE — 25010000002 FENTANYL CITRATE (PF) 100 MCG/2ML SOLUTION: Performed by: NURSE ANESTHETIST, CERTIFIED REGISTERED

## 2020-05-11 PROCEDURE — 25010000002 PROPOFOL 10 MG/ML EMULSION: Performed by: NURSE ANESTHETIST, CERTIFIED REGISTERED

## 2020-05-11 PROCEDURE — 25010000002 VANCOMYCIN 1 G RECONSTITUTED SOLUTION 1 EACH VIAL: Performed by: NEUROLOGICAL SURGERY

## 2020-05-11 PROCEDURE — 25010000002 DEXAMETHASONE PER 1 MG: Performed by: NURSE ANESTHETIST, CERTIFIED REGISTERED

## 2020-05-11 PROCEDURE — 61781 SCAN PROC CRANIAL INTRA: CPT | Performed by: NEUROLOGICAL SURGERY

## 2020-05-11 PROCEDURE — 61510 CRNEC TREPH EXC BRN TUM STTL: CPT | Performed by: NEUROLOGICAL SURGERY

## 2020-05-11 PROCEDURE — 85025 COMPLETE CBC W/AUTO DIFF WBC: CPT | Performed by: NEUROLOGICAL SURGERY

## 2020-05-11 PROCEDURE — 25010000003 LEVETIRACETAM IN NACL 0.75% 1000 MG/100ML SOLUTION: Performed by: NURSE ANESTHETIST, CERTIFIED REGISTERED

## 2020-05-11 PROCEDURE — 25010000002 ONDANSETRON PER 1 MG: Performed by: NURSE ANESTHETIST, CERTIFIED REGISTERED

## 2020-05-11 PROCEDURE — 88341 IMHCHEM/IMCYTCHM EA ADD ANTB: CPT | Performed by: NEUROLOGICAL SURGERY

## 2020-05-11 PROCEDURE — 25010000002 PHENYLEPHRINE PER 1 ML: Performed by: NURSE ANESTHETIST, CERTIFIED REGISTERED

## 2020-05-11 PROCEDURE — 88334 PATH CONSLTJ SURG CYTO XM EA: CPT | Performed by: NEUROLOGICAL SURGERY

## 2020-05-11 PROCEDURE — 25010000002 NEOSTIGMINE PER 0.5 MG: Performed by: NURSE ANESTHETIST, CERTIFIED REGISTERED

## 2020-05-11 PROCEDURE — 88342 IMHCHEM/IMCYTCHM 1ST ANTB: CPT | Performed by: NEUROLOGICAL SURGERY

## 2020-05-11 DEVICE — FLOSEAL HEMOSTATIC MATRIX, 5ML
Type: IMPLANTABLE DEVICE | Site: CRANIAL | Status: FUNCTIONAL
Brand: FLOSEAL HEMOSTATIC MATRIX

## 2020-05-11 DEVICE — SCRW CRS/DRV DRL FREE MICRO TI 1.5X4MM: Type: IMPLANTABLE DEVICE | Site: CRANIAL | Status: FUNCTIONAL

## 2020-05-11 DEVICE — ABSORBABLE HEMOSTAT (OXIDIZED REGENERATED CELLULOSE, U.S.P.)
Type: IMPLANTABLE DEVICE | Site: CRANIAL | Status: FUNCTIONAL
Brand: SURGICEL FIBRILLAR

## 2020-05-11 DEVICE — PLT CVR LEVELONE BURHL LP CONTRL .3X17X1.5MM: Type: IMPLANTABLE DEVICE | Site: CRANIAL | Status: FUNCTIONAL

## 2020-05-11 DEVICE — PLT CRANI LEVELONE LP STR TI 4HL XLG .6MM: Type: IMPLANTABLE DEVICE | Site: CRANIAL | Status: FUNCTIONAL

## 2020-05-11 DEVICE — SSC BONE WAX
Type: IMPLANTABLE DEVICE | Site: CRANIAL | Status: FUNCTIONAL
Brand: SSC BONE WAX

## 2020-05-11 RX ORDER — PROMETHAZINE HYDROCHLORIDE 25 MG/ML
12.5 INJECTION, SOLUTION INTRAMUSCULAR; INTRAVENOUS ONCE AS NEEDED
Status: DISCONTINUED | OUTPATIENT
Start: 2020-05-11 | End: 2020-05-11 | Stop reason: HOSPADM

## 2020-05-11 RX ORDER — ACETAMINOPHEN 325 MG/1
650 TABLET ORAL EVERY 6 HOURS PRN
Status: DISCONTINUED | OUTPATIENT
Start: 2020-05-11 | End: 2020-05-13 | Stop reason: HOSPADM

## 2020-05-11 RX ORDER — EPHEDRINE SULFATE 50 MG/ML
INJECTION, SOLUTION INTRAVENOUS AS NEEDED
Status: DISCONTINUED | OUTPATIENT
Start: 2020-05-11 | End: 2020-05-11 | Stop reason: SURG

## 2020-05-11 RX ORDER — FENTANYL CITRATE 50 UG/ML
50 INJECTION, SOLUTION INTRAMUSCULAR; INTRAVENOUS
Status: DISCONTINUED | OUTPATIENT
Start: 2020-05-11 | End: 2020-05-11 | Stop reason: HOSPADM

## 2020-05-11 RX ORDER — ONDANSETRON 4 MG/1
4 TABLET, FILM COATED ORAL EVERY 6 HOURS PRN
Status: DISCONTINUED | OUTPATIENT
Start: 2020-05-11 | End: 2020-05-13 | Stop reason: HOSPADM

## 2020-05-11 RX ORDER — PROMETHAZINE HYDROCHLORIDE 25 MG/ML
6.25 INJECTION, SOLUTION INTRAMUSCULAR; INTRAVENOUS
Status: DISCONTINUED | OUTPATIENT
Start: 2020-05-11 | End: 2020-05-11 | Stop reason: HOSPADM

## 2020-05-11 RX ORDER — MIDAZOLAM HYDROCHLORIDE 1 MG/ML
1 INJECTION INTRAMUSCULAR; INTRAVENOUS
Status: DISCONTINUED | OUTPATIENT
Start: 2020-05-11 | End: 2020-05-11 | Stop reason: HOSPADM

## 2020-05-11 RX ORDER — DEXAMETHASONE SODIUM PHOSPHATE 10 MG/ML
INJECTION INTRAMUSCULAR; INTRAVENOUS AS NEEDED
Status: DISCONTINUED | OUTPATIENT
Start: 2020-05-11 | End: 2020-05-11 | Stop reason: SURG

## 2020-05-11 RX ORDER — SODIUM CHLORIDE AND POTASSIUM CHLORIDE 150; 900 MG/100ML; MG/100ML
100 INJECTION, SOLUTION INTRAVENOUS CONTINUOUS
Status: DISCONTINUED | OUTPATIENT
Start: 2020-05-11 | End: 2020-05-12

## 2020-05-11 RX ORDER — LIDOCAINE HYDROCHLORIDE 10 MG/ML
0.5 INJECTION, SOLUTION EPIDURAL; INFILTRATION; INTRACAUDAL; PERINEURAL ONCE AS NEEDED
Status: DISCONTINUED | OUTPATIENT
Start: 2020-05-11 | End: 2020-05-11 | Stop reason: HOSPADM

## 2020-05-11 RX ORDER — PROMETHAZINE HYDROCHLORIDE 25 MG/1
25 TABLET ORAL ONCE AS NEEDED
Status: DISCONTINUED | OUTPATIENT
Start: 2020-05-11 | End: 2020-05-11 | Stop reason: HOSPADM

## 2020-05-11 RX ORDER — FAMOTIDINE 10 MG/ML
20 INJECTION, SOLUTION INTRAVENOUS ONCE
Status: COMPLETED | OUTPATIENT
Start: 2020-05-11 | End: 2020-05-11

## 2020-05-11 RX ORDER — GLYCOPYRROLATE 0.2 MG/ML
INJECTION INTRAMUSCULAR; INTRAVENOUS AS NEEDED
Status: DISCONTINUED | OUTPATIENT
Start: 2020-05-11 | End: 2020-05-11 | Stop reason: SURG

## 2020-05-11 RX ORDER — ACETAMINOPHEN 325 MG/1
650 TABLET ORAL ONCE AS NEEDED
Status: DISCONTINUED | OUTPATIENT
Start: 2020-05-11 | End: 2020-05-11 | Stop reason: HOSPADM

## 2020-05-11 RX ORDER — LIDOCAINE HYDROCHLORIDE 20 MG/ML
INJECTION, SOLUTION INFILTRATION; PERINEURAL AS NEEDED
Status: DISCONTINUED | OUTPATIENT
Start: 2020-05-11 | End: 2020-05-11 | Stop reason: SURG

## 2020-05-11 RX ORDER — NALOXONE HCL 0.4 MG/ML
0.4 VIAL (ML) INJECTION
Status: DISCONTINUED | OUTPATIENT
Start: 2020-05-11 | End: 2020-05-13 | Stop reason: HOSPADM

## 2020-05-11 RX ORDER — TEMAZEPAM 15 MG/1
15 CAPSULE ORAL NIGHTLY PRN
Status: DISCONTINUED | OUTPATIENT
Start: 2020-05-11 | End: 2020-05-13 | Stop reason: HOSPADM

## 2020-05-11 RX ORDER — FLUMAZENIL 0.1 MG/ML
0.2 INJECTION INTRAVENOUS AS NEEDED
Status: DISCONTINUED | OUTPATIENT
Start: 2020-05-11 | End: 2020-05-11 | Stop reason: HOSPADM

## 2020-05-11 RX ORDER — SODIUM CHLORIDE 0.9 % (FLUSH) 0.9 %
3 SYRINGE (ML) INJECTION EVERY 12 HOURS SCHEDULED
Status: DISCONTINUED | OUTPATIENT
Start: 2020-05-11 | End: 2020-05-11 | Stop reason: HOSPADM

## 2020-05-11 RX ORDER — SODIUM CHLORIDE, SODIUM LACTATE, POTASSIUM CHLORIDE, CALCIUM CHLORIDE 600; 310; 30; 20 MG/100ML; MG/100ML; MG/100ML; MG/100ML
9 INJECTION, SOLUTION INTRAVENOUS CONTINUOUS
Status: DISCONTINUED | OUTPATIENT
Start: 2020-05-11 | End: 2020-05-11

## 2020-05-11 RX ORDER — HYDRALAZINE HYDROCHLORIDE 20 MG/ML
5 INJECTION INTRAMUSCULAR; INTRAVENOUS
Status: DISCONTINUED | OUTPATIENT
Start: 2020-05-11 | End: 2020-05-11 | Stop reason: HOSPADM

## 2020-05-11 RX ORDER — NEBIVOLOL 5 MG/1
5 TABLET ORAL DAILY
Status: DISCONTINUED | OUTPATIENT
Start: 2020-05-12 | End: 2020-05-13 | Stop reason: HOSPADM

## 2020-05-11 RX ORDER — ROCURONIUM BROMIDE 10 MG/ML
INJECTION, SOLUTION INTRAVENOUS AS NEEDED
Status: DISCONTINUED | OUTPATIENT
Start: 2020-05-11 | End: 2020-05-11 | Stop reason: SURG

## 2020-05-11 RX ORDER — HYDROCODONE BITARTRATE AND ACETAMINOPHEN 7.5; 325 MG/1; MG/1
1 TABLET ORAL ONCE AS NEEDED
Status: DISCONTINUED | OUTPATIENT
Start: 2020-05-11 | End: 2020-05-11 | Stop reason: HOSPADM

## 2020-05-11 RX ORDER — GABAPENTIN 300 MG/1
300 CAPSULE ORAL NIGHTLY
Status: DISCONTINUED | OUTPATIENT
Start: 2020-05-11 | End: 2020-05-13 | Stop reason: HOSPADM

## 2020-05-11 RX ORDER — PROPOFOL 10 MG/ML
VIAL (ML) INTRAVENOUS AS NEEDED
Status: DISCONTINUED | OUTPATIENT
Start: 2020-05-11 | End: 2020-05-11 | Stop reason: SURG

## 2020-05-11 RX ORDER — FENTANYL CITRATE 50 UG/ML
INJECTION, SOLUTION INTRAMUSCULAR; INTRAVENOUS AS NEEDED
Status: DISCONTINUED | OUTPATIENT
Start: 2020-05-11 | End: 2020-05-11 | Stop reason: SURG

## 2020-05-11 RX ORDER — LEVETIRACETAM 10 MG/ML
1000 INJECTION INTRAVASCULAR ONCE
Status: COMPLETED | OUTPATIENT
Start: 2020-05-11 | End: 2020-05-11

## 2020-05-11 RX ORDER — CEFAZOLIN SODIUM 2 G/100ML
2 INJECTION, SOLUTION INTRAVENOUS ONCE
Status: COMPLETED | OUTPATIENT
Start: 2020-05-11 | End: 2020-05-11

## 2020-05-11 RX ORDER — HYDROMORPHONE HYDROCHLORIDE 1 MG/ML
0.5 INJECTION, SOLUTION INTRAMUSCULAR; INTRAVENOUS; SUBCUTANEOUS
Status: DISCONTINUED | OUTPATIENT
Start: 2020-05-11 | End: 2020-05-11 | Stop reason: HOSPADM

## 2020-05-11 RX ORDER — CEFAZOLIN SODIUM 2 G/100ML
2 INJECTION, SOLUTION INTRAVENOUS EVERY 8 HOURS
Status: COMPLETED | OUTPATIENT
Start: 2020-05-11 | End: 2020-05-13

## 2020-05-11 RX ORDER — CYCLOBENZAPRINE HCL 10 MG
10 TABLET ORAL NIGHTLY PRN
Status: DISCONTINUED | OUTPATIENT
Start: 2020-05-11 | End: 2020-05-13 | Stop reason: HOSPADM

## 2020-05-11 RX ORDER — HYDROMORPHONE HYDROCHLORIDE 1 MG/ML
0.5 INJECTION, SOLUTION INTRAMUSCULAR; INTRAVENOUS; SUBCUTANEOUS
Status: DISCONTINUED | OUTPATIENT
Start: 2020-05-11 | End: 2020-05-13 | Stop reason: HOSPADM

## 2020-05-11 RX ORDER — SODIUM CHLORIDE 0.9 % (FLUSH) 0.9 %
3-10 SYRINGE (ML) INJECTION AS NEEDED
Status: DISCONTINUED | OUTPATIENT
Start: 2020-05-11 | End: 2020-05-11 | Stop reason: HOSPADM

## 2020-05-11 RX ORDER — MIDAZOLAM HYDROCHLORIDE 1 MG/ML
2 INJECTION INTRAMUSCULAR; INTRAVENOUS
Status: DISCONTINUED | OUTPATIENT
Start: 2020-05-11 | End: 2020-05-11 | Stop reason: HOSPADM

## 2020-05-11 RX ORDER — HYDROCODONE BITARTRATE AND ACETAMINOPHEN 5; 325 MG/1; MG/1
1 TABLET ORAL EVERY 4 HOURS PRN
Status: DISCONTINUED | OUTPATIENT
Start: 2020-05-11 | End: 2020-05-13 | Stop reason: HOSPADM

## 2020-05-11 RX ORDER — PROMETHAZINE HYDROCHLORIDE 25 MG/1
25 SUPPOSITORY RECTAL ONCE AS NEEDED
Status: DISCONTINUED | OUTPATIENT
Start: 2020-05-11 | End: 2020-05-11 | Stop reason: HOSPADM

## 2020-05-11 RX ORDER — ONDANSETRON 2 MG/ML
INJECTION INTRAMUSCULAR; INTRAVENOUS AS NEEDED
Status: DISCONTINUED | OUTPATIENT
Start: 2020-05-11 | End: 2020-05-11 | Stop reason: SURG

## 2020-05-11 RX ORDER — ONDANSETRON 2 MG/ML
4 INJECTION INTRAMUSCULAR; INTRAVENOUS ONCE AS NEEDED
Status: DISCONTINUED | OUTPATIENT
Start: 2020-05-11 | End: 2020-05-11 | Stop reason: HOSPADM

## 2020-05-11 RX ORDER — ONDANSETRON 2 MG/ML
4 INJECTION INTRAMUSCULAR; INTRAVENOUS EVERY 6 HOURS PRN
Status: DISCONTINUED | OUTPATIENT
Start: 2020-05-11 | End: 2020-05-13 | Stop reason: HOSPADM

## 2020-05-11 RX ORDER — ONDANSETRON HYDROCHLORIDE 8 MG/1
8 TABLET, FILM COATED ORAL EVERY 8 HOURS PRN
Status: DISCONTINUED | OUTPATIENT
Start: 2020-05-11 | End: 2020-05-12

## 2020-05-11 RX ORDER — NALOXONE HCL 0.4 MG/ML
0.2 VIAL (ML) INJECTION AS NEEDED
Status: DISCONTINUED | OUTPATIENT
Start: 2020-05-11 | End: 2020-05-11 | Stop reason: HOSPADM

## 2020-05-11 RX ORDER — ACETAMINOPHEN 325 MG/1
650 TABLET ORAL EVERY 6 HOURS PRN
COMMUNITY

## 2020-05-11 RX ORDER — SODIUM CHLORIDE, SODIUM ACETATE ANHYDROUS, SODIUM GLUCONATE, POTASSIUM CHLORIDE, AND MAGNESIUM CHLORIDE 526; 222; 502; 37; 30 MG/100ML; MG/100ML; MG/100ML; MG/100ML; MG/100ML
IRRIGANT IRRIGATION AS NEEDED
Status: DISCONTINUED | OUTPATIENT
Start: 2020-05-11 | End: 2020-05-11 | Stop reason: HOSPADM

## 2020-05-11 RX ADMIN — ONDANSETRON HYDROCHLORIDE 4 MG: 2 SOLUTION INTRAMUSCULAR; INTRAVENOUS at 11:42

## 2020-05-11 RX ADMIN — SODIUM CHLORIDE 15 MG/HR: 9 INJECTION, SOLUTION INTRAVENOUS at 20:51

## 2020-05-11 RX ADMIN — FENTANYL CITRATE 50 MCG: 50 INJECTION, SOLUTION INTRAMUSCULAR; INTRAVENOUS at 08:56

## 2020-05-11 RX ADMIN — CEFAZOLIN SODIUM 2 G: 2 INJECTION, SOLUTION INTRAVENOUS at 18:34

## 2020-05-11 RX ADMIN — SODIUM CHLORIDE 15 MG/HR: 9 INJECTION, SOLUTION INTRAVENOUS at 18:27

## 2020-05-11 RX ADMIN — SODIUM CHLORIDE, POTASSIUM CHLORIDE, SODIUM LACTATE AND CALCIUM CHLORIDE 9 ML/HR: 600; 310; 30; 20 INJECTION, SOLUTION INTRAVENOUS at 08:12

## 2020-05-11 RX ADMIN — PHENYLEPHRINE HYDROCHLORIDE 100 MCG: 10 INJECTION INTRAVENOUS at 11:19

## 2020-05-11 RX ADMIN — FAMOTIDINE 20 MG: 10 INJECTION, SOLUTION INTRAVENOUS at 08:15

## 2020-05-11 RX ADMIN — EPHEDRINE SULFATE 5 MG: 50 INJECTION INTRAVENOUS at 10:40

## 2020-05-11 RX ADMIN — PROPOFOL 50 MG: 10 INJECTION, EMULSION INTRAVENOUS at 11:20

## 2020-05-11 RX ADMIN — ROCURONIUM BROMIDE 40 MG: 10 INJECTION, SOLUTION INTRAVENOUS at 09:47

## 2020-05-11 RX ADMIN — POTASSIUM CHLORIDE AND SODIUM CHLORIDE 100 ML/HR: 900; 150 INJECTION, SOLUTION INTRAVENOUS at 18:25

## 2020-05-11 RX ADMIN — PHENYLEPHRINE HYDROCHLORIDE 100 MCG: 10 INJECTION INTRAVENOUS at 11:18

## 2020-05-11 RX ADMIN — SODIUM CHLORIDE 15 MG/HR: 9 INJECTION, SOLUTION INTRAVENOUS at 16:17

## 2020-05-11 RX ADMIN — PROPOFOL 150 MG: 10 INJECTION, EMULSION INTRAVENOUS at 09:47

## 2020-05-11 RX ADMIN — SODIUM CHLORIDE 25 MG: 9 INJECTION, SOLUTION INTRAVENOUS at 13:28

## 2020-05-11 RX ADMIN — EPHEDRINE SULFATE 10 MG: 50 INJECTION INTRAVENOUS at 09:57

## 2020-05-11 RX ADMIN — DEXAMETHASONE SODIUM PHOSPHATE 6 MG: 10 INJECTION INTRAMUSCULAR; INTRAVENOUS at 10:11

## 2020-05-11 RX ADMIN — LIDOCAINE HYDROCHLORIDE 100 MG: 20 INJECTION, SOLUTION INFILTRATION; PERINEURAL at 09:47

## 2020-05-11 RX ADMIN — SODIUM CHLORIDE, POTASSIUM CHLORIDE, SODIUM LACTATE AND CALCIUM CHLORIDE: 600; 310; 30; 20 INJECTION, SOLUTION INTRAVENOUS at 11:47

## 2020-05-11 RX ADMIN — NEOSTIGMINE METHYLSULFATE 3 MG: 1 INJECTION INTRAMUSCULAR; INTRAVENOUS; SUBCUTANEOUS at 12:23

## 2020-05-11 RX ADMIN — ACETAMINOPHEN 650 MG: 325 TABLET, FILM COATED ORAL at 15:13

## 2020-05-11 RX ADMIN — PROPOFOL 50 MG: 10 INJECTION, EMULSION INTRAVENOUS at 10:01

## 2020-05-11 RX ADMIN — GLYCOPYRROLATE 0.6 MG: 0.2 INJECTION INTRAMUSCULAR; INTRAVENOUS at 12:23

## 2020-05-11 RX ADMIN — GABAPENTIN 300 MG: 300 CAPSULE ORAL at 20:38

## 2020-05-11 RX ADMIN — FENTANYL CITRATE 50 MCG: 50 INJECTION INTRAMUSCULAR; INTRAVENOUS at 09:47

## 2020-05-11 RX ADMIN — FENTANYL CITRATE 50 MCG: 50 INJECTION INTRAMUSCULAR; INTRAVENOUS at 10:29

## 2020-05-11 RX ADMIN — LEVETIRACETAM 1000 MG: 10 INJECTION INTRAVENOUS at 10:15

## 2020-05-11 RX ADMIN — MIDAZOLAM 2 MG: 1 INJECTION INTRAMUSCULAR; INTRAVENOUS at 08:57

## 2020-05-11 RX ADMIN — CEFAZOLIN SODIUM 2 G: 2 INJECTION, SOLUTION INTRAVENOUS at 10:00

## 2020-05-11 RX ADMIN — SODIUM CHLORIDE 7.5 MG/HR: 9 INJECTION, SOLUTION INTRAVENOUS at 23:46

## 2020-05-11 RX ADMIN — ROCURONIUM BROMIDE 10 MG: 10 INJECTION, SOLUTION INTRAVENOUS at 10:46

## 2020-05-11 RX ADMIN — EPHEDRINE SULFATE 10 MG: 50 INJECTION INTRAVENOUS at 11:18

## 2020-05-11 NOTE — OP NOTE
Preoperative diagnosis: Metastatic melanoma to the left frontal pole    Postoperative diagnosis: Same    Procedure performed: Left frontal craniotomy with gross total removal of a left frontal metastatic melanoma using microsurgical technique microsurgical instrumentation and stereotactic guidance    Surgeon: Francisco Mcdonough M.D.    Asst.: Gianna David CFA who was instrumental in helping with hemostasis, visualization of neural structures and retraction of neural structures    Estimated blood loss, crystalloid, colloid, blood: Please see anesthesia record    Material to lab:   The entire tumor plus adjacent resected brain was sent for both frozen and permanent section    Drains: 1 subgaleal drain    Complications: None    Indications for the procedure: This is a man with a known history of metastatic melanoma who had a area in the brain that had already been radiated but was continuing to grow.  Because of that he elected to proceed with surgery.    Operative summary:  The patient was brought into the operating room and placed under general endotracheal anesthesia using intravenous and inhalational agents.  The patient was then positioned on the operating table in the supine position.  All pressure points were padded including peripheral points of entrapment.  His head was secured in the Rdz headrest and tilted slightly forward.  He was then reference to the Stealth navigation system.  The navigation system was used to plan out the craniotomy.  A bicoronal incision was then outlined across the top of the head and he was prepped with ChloraPrep.  He was then draped with Ioban, towels, half sheets and a cranial drape.  Incision was injected with 20 cc of 1/8% Marcaine 1 200,000 epinephrine solution.  Following this the incision was created and then the scalp was mobilized off of the frontal bone down to the level of the orbital rim.  I did not pull it all the way past the orbital rim because I did not intend to  go that low with the craniotomy.  This is also done to minimize trauma to any of the supraorbital nerve bundles.  A craniotomy was then planned out just above the frontal sinus so as not to intrude on the sinus itself.  A bur hole was created and was connected around the perimeter using the B1 bit on the [employer] drill.  Bone flap was then elevated and set aside.  The dura was opened in a curvilinear fashion and then the frontal pole cortex was coagulated just distal to bridging veins which were left completely intact.  Was able to coagulate arachnoid and cortex along the medial aspect of the frontal pole and then using navigation we went far lateral enough to get past the tumor.  Ultimately using a combination of the bipolar cautery, microsurgical instruments and the ultrasonic aspirator were able to gradually work her way down to the arachnoid on the base of the frontal pole.  This was also coagulated and divided sharply.  At the conclusion of the procedure the tumor was completely resected and the systolic blood pressure was brought up to 160 with no evidence of bleeding.  The raw edges of brain were outlined with Surgicel fibrillar and then the dura was tacked back together using the 4-0 Nurolon.  This was then outlined with thrombin and Gelfoam and the bone flap was put back into place using the KLS system.  Following this a subgaleal 10 Latvian drain was placed and then the scalp was closed in layers dressed and the patient was taken to the recovery room in stable condition.  There were no apparent complications and the sponge, instrument and needle counts were correct at the end of the procedure.

## 2020-05-11 NOTE — PLAN OF CARE
S/p crani today for tumor removal. No neuro deficits. Complaints of headache x1, medicated with prn Tylenol and pain improved. NGOC drain with no output. Venango in place and pulse palpable. Incision dermabonded and well approximated.

## 2020-05-11 NOTE — ANESTHESIA PROCEDURE NOTES
Airway  Urgency: elective    Date/Time: 5/11/2020 9:53 AM  Airway not difficult    General Information and Staff    Patient location during procedure: OR  Anesthesiologist: Tiffanie Saini MD  CRNA: Gianna Parry CRNA    Indications and Patient Condition  Indications for airway management: airway protection    Preoxygenated: yes (pt pre-O2 with 100% O2)  Mask difficulty assessment: 3 - difficult mask (inadequate, unstable or two providers) +/- NMBA    Final Airway Details  Final airway type: endotracheal airway      Successful airway: ETT  Cuffed: yes   Successful intubation technique: direct laryngoscopy  Endotracheal tube insertion site: oral  Blade: Blanca  Blade size: 4  ETT size (mm): 7.5  Cormack-Lehane Classification: grade I - full view of glottis  Placement verified by: chest auscultation and capnometry   Cuff volume (mL): 7  Measured from: lips  ETT/EBT  to lips (cm): 22  Number of attempts at approach: 1  Assessment: lips, teeth, and gum same as pre-op and atraumatic intubation    Additional Comments  ATOETx1. No change in dentition.

## 2020-05-11 NOTE — CONSULTS
Group: [facility]         CONSULT NOTE    Patient Identification:    Gelacio Cunningham  65 y.o.  male  1955  [id number]            Patient Care Team:  Favio Guaman MD as PCP - Albin Pacheco MD as Consulting Physician (Hematology and Oncology)  Favio Guaman MD as Referring Physician (Internal Medicine)  Favio Guaman MD as Referring Physician (Internal Medicine)  Favio Islas MD (Internal Medicine)    Requesting physician:     Reason for Consultation: Postoperative management    CC: Headache    History of Present Illness: Patient is a pleasant 65-year-old white male previous smoker with past medical history significant for COPD, metastatic melanoma with liver, bone and brain mets previously treated with radiation and chemotherapy now with new intracranial lesion which is been resected by Dr. Mcdonough today.  Patient had the procedure uneventfully and is transferred to the ICU postoperatively for management.  We have been asked to assist in management patient.  Blood pressure is decently controlled with PRN medications.  Pain is controlled.  He does not have any new weakness or numbness.  More history could not be obtained as patient is confused due to postop sedation    I have reviewed the H&P as well as the notes from the other consultants taking care of the patient this admission as well as previous hospital notes (if any available) from our group physicians and summarized above      Objective     Review of Systems:  Unable to obtain due to postop sedation    Past Medical History:  Past Medical History:   Diagnosis Date   • Arthritis     Rheumatoid arthritis multiple sites   • Cancer (CMS/HCC) 2017    melanoma, right ear, lung, brain, bone   • DDD (degenerative disc disease), lumbar    • H/O Liver masses    • Hypertension    • Liver disease     liver lesions   • Metastatic melanoma to liver (CMS/HCC)    • Neck mass, left 2018   •  Rheumatoid arthritis (CMS/HCC)     has had 14 years, was on Methotrexate, Embrel, Humira and now Rituxin       Past Surgical History:  Past Surgical History:   Procedure Laterality Date   • COLONOSCOPY N/A 12/23/2016    Procedure: COLONOSCOPY TO CECUM WITH POLYPECTOMY (COLD BIOPSY);  Surgeon: Antolin Munoz Jr., MD;  Location: [facility] ENDOSCOPY;  Service:    • COLONOSCOPY N/A 1/27/2019    Procedure: COLONOSCOPY TO CECUM;  Surgeon: Eduardo Rashid MD;  Location: [facility] ENDOSCOPY;  Service: General   • LIVER BIOPSY     • SKIN CANCER EXCISION          Home Meds:  Medications Prior to Admission   Medication Sig Dispense Refill Last Dose   • acetaminophen (TYLENOL) 325 MG tablet Take 650 mg by mouth Every 6 (Six) Hours As Needed for Mild Pain .   Past Week at Unknown time   • Chlorhexidine Gluconate Cloth 2 % pads Apply  topically. AS DIRECTED PREOP   5/11/2020 at 0530   • cyclobenzaprine (FLEXERIL) 5 MG tablet Take 10 mg by mouth At Night As Needed for Muscle Spasms.   5/4/2020   • folic acid (FOLVITE) 1 MG tablet Take 1 mg by mouth Daily.   5/11/2020 at 0530   • gabapentin (NEURONTIN) 300 MG capsule Take 1 capsule by mouth Daily. (Patient taking differently: Take 300 mg by mouth Every Night.) 90 capsule 1 5/10/2020 at 2000   • MAGNESIUM PO Take 250 mg by mouth Daily.   5/11/2020 at 0530   • Multiple Vitamins-Minerals (MULTIVITAMIN WITH MINERALS) tablet tablet Take 1 tablet by mouth Daily.   5/11/2020 at 0530   • nebivolol (BYSTOLIC) 5 MG tablet Take 1 tablet by mouth Daily. 90 tablet 1 5/11/2020 at 0530   • ondansetron (ZOFRAN) 8 MG tablet Take 1 tablet by mouth Every 8 (Eight) Hours As Needed for Nausea or Vomiting. 30 tablet 2 5/4/2020   • predniSONE (DELTASONE) 10 MG tablet Take 10-20 mg by mouth As Needed (20mg for one dose next two days 10mg daily).   4/27/2020   • temazepam (RESTORIL) 15 MG capsule Take 15 mg by mouth At Night As Needed for Sleep.   4/11/2020   • vitamin B-6 (PYRIDOXINE) 100 MG tablet Take 100  "mg by mouth Daily.   2020 at 0530   • hydroxychloroquine (PLAQUENIL) 200 MG tablet Take 400 mg by mouth Daily.   2020   • triamcinolone (KENALOG) 0.1 % ointment Apply  topically to the appropriate area as directed See Admin Instructions. Apply topically to the affected area twice daily as directed 30 g 1 More than a month at Unknown time       Allergies:  Allergies   Allergen Reactions   • Sulindac Diarrhea       Social History:   Social History     Socioeconomic History   • Marital status:      Spouse name: Angelique   • Number of children: Not on file   • Years of education: Not on file   • Highest education level: Not on file   Occupational History     Employer: [employer]   Tobacco Use   • Smoking status: Former Smoker     Packs/day: 2.00     Years: 25.00     Pack years: 50.00     Types: Cigarettes     Last attempt to quit:      Years since quittin.3   • Smokeless tobacco: Never Used   Substance and Sexual Activity   • Alcohol use: No   • Drug use: No   • Sexual activity: Defer       Family History:  Family History   Problem Relation Age of Onset   • COPD Father    • Cancer Father    • Asthma Brother    • Diabetes Paternal Grandfather    • Malig Hyperthermia Neg Hx        Physical Exam:  /67   Pulse 95   Temp 98 °F (36.7 °C) (Oral)   Resp (P) 16   Ht 172.7 cm (68\")   Wt 89.4 kg (197 lb 1.5 oz)   SpO2 94%   BMI 29.97 kg/m²  Body mass index is 29.97 kg/m². 94% 89.4 kg (197 lb 1.5 oz)    Physical Exam     Constitutional: Middle aged pt in bed, No acute respiratory distress, no accessory muscle use  Head: -Postop cranial changes wound with bandage -C/D/I  Eyes: No pallor, Anicteric conjunctiva, EOMI.  ENMT:  Mallampati 4, no oral thrush. Moist MM.   NECK: Trachea midline, No thyromegaly, no palpable cervical LNpathy  Heart: RRR, no murmur. Trace pedal edema   Lungs: PATTI +, No wheezes/ crackles heard    Abdomen: Soft. No tenderness, guarding or rigidity. No palpable " masses  Extremities: Extremities warm and well perfused. No cyanosis/ clubbing  Neuro: Conscious, answers appropriately, no gross focal neuro deficits  Psych: Mood and affect appropriate    LABS:  Lab Results   Component Value Date    CALCIUM 9.2 04/27/2020     Results from last 7 days   Lab Units 05/08/20  1029 05/08/20  0725   CREATININE mg/dL 1.10  --    WBC 10*3/mm3  --  6.70   HEMOGLOBIN g/dL  --  13.8   PLATELETS 10*3/mm3  --  240     No results found for: CKTOTAL, CKMB, CKMBINDEX, TROPONINI, TROPONINT                          Imaging: I personally visualized the images of chest scans/ x-rays performed within last 3 days and summarized below.    Assessment   Left frontal metastatic melanoma s/p Craney with resection  Postoperative pain managed  Malignant melanoma liver, bone and brain mets s/p Immuno Rx   RA on Rituxan  Emphysema/COPD not in exacerbation  Incidental 9 mm left upper lobe lung nodule -continue outpatient follow-up CAT scan  CKD    RECOMMENDATIONS:  Patient doing well postoperatively and does not have any significant new neurological deficits  Pain control PRN medication  We will get blood work to evaluate for blood loss and check for renal function.  We will discontinue IV fluids.  Advanced diet when okay per primary service  We will continue to follow along with you and assist in the postop care.     Thank you for letting me participate in the care of this pleasant patient.  I have discussed my findings and recommendations with patient, nursing staff and consulting provider.     Jose Abad MD  5/11/2020  16:13

## 2020-05-11 NOTE — ANESTHESIA PROCEDURE NOTES
Arterial Line    Pre-sedation assessment completed: 5/11/2020 8:48 AM    Patient reassessed immediately prior to procedure    Patient location during procedure: holding area  Start time: 5/11/2020 8:48 AM  Stop Time:5/11/2020 8:58 AM       Line placed for hemodynamic monitoring, ABGs/Labs/ISTAT and MD/Surgeon request.  Performed By   Anesthesiologist: Jet Michelle MD  Preanesthetic Checklist  Completed: patient identified, surgical consent, pre-op evaluation, timeout performed, IV checked, risks and benefits discussed and monitors and equipment checked  Arterial Line Prep   Sterile Tech: cap, gloves, gown, mask and sterile barriers  Prep: ChloraPrep  Patient monitoring: blood pressure monitoring, continuous pulse oximetry and EKG  Arterial Line Procedure   Laterality:right  Location:  radial artery  Catheter size: 20 G   Guidance: ultrasound guided  PROCEDURE NOTE/ULTRASOUND INTERPRETATION.  Using ultrasound guidance the potential vascular sites for insertion of the catheter were visualized to determine the patency of the vessel to be used for vascular access.  After selecting the appropriate site for insertion, the needle was visualized under ultrasound being inserted into the radial artery, followed by ultrasound confirmation of wire and catheter placement. There were no abnormalities seen on ultrasound; an image was taken; and the patient tolerated the procedure with no complications.   Number of attempts: 1  Successful placement: yes  Post Assessment   Dressing Type: occlusive dressing applied, secured with tape and wrist guard applied.   Complications no  Circ/Move/Sens Assessment: normal.   Patient Tolerance: patient tolerated the procedure well with no apparent complications

## 2020-05-11 NOTE — ANESTHESIA PREPROCEDURE EVALUATION
Anesthesia Evaluation     Patient summary reviewed and Nursing notes reviewed                Airway   Mallampati: II  Dental    (+) upper dentures and lower dentures    Pulmonary    (+) a smoker Former,   Cardiovascular     ECG reviewed  Rhythm: regular  Rate: normal    (+) hypertension, hyperlipidemia,       Neuro/Psych- negative ROS  GI/Hepatic/Renal/Endo    (+) obesity,   liver disease,     Musculoskeletal     Abdominal    Substance History - negative use     OB/GYN negative ob/gyn ROS         Other   arthritis,    history of cancer active                    Anesthesia Plan    ASA 3     general   (Full upper and lower dentures)  intravenous induction     Anesthetic plan, all risks, benefits, and alternatives have been provided, discussed and informed consent has been obtained with: patient.

## 2020-05-11 NOTE — ANESTHESIA POSTPROCEDURE EVALUATION
"Patient: Gelacio Cunningham    Procedure Summary     Date:  05/11/20 Room / Location:  [facility] OR  / [facility] MAIN OR    Anesthesia Start:  0940 Anesthesia Stop:  1244    Procedure:  Left frontal craniotomy for tumor using stereotactic guidance (Left Head) Diagnosis:       Brain metastases (CMS/HCC)      (Brain metastases (CMS/HCC) [C79.31])    Surgeon:  Francisco Mcdonough MD Provider:  Tiffanie Saini MD    Anesthesia Type:  general ASA Status:  3          Anesthesia Type: general    Vitals  Vitals Value Taken Time   /71 5/11/2020  2:00 PM   Temp 36.7 °C (98 °F) 5/11/2020  2:00 PM   Pulse 85 5/11/2020  2:00 PM   Resp 14 5/11/2020  2:00 PM   SpO2 94 % 5/11/2020  2:00 PM   Vitals shown include unvalidated device data.        Post Anesthesia Care and Evaluation    Patient location during evaluation: PACU  Patient participation: complete - patient participated  Level of consciousness: awake and alert  Pain management: adequate  Airway patency: patent  Anesthetic complications: No anesthetic complications    Cardiovascular status: acceptable  Respiratory status: acceptable  Hydration status: acceptable    Comments: /71 (BP Location: Left arm, Patient Position: Lying)   Pulse 87   Temp 36.7 °C (98 °F) (Oral)   Resp 14   Ht 172.7 cm (68\")   Wt 89.4 kg (197 lb 1.5 oz)   SpO2 96%   BMI 29.97 kg/m²         " Patient calling requesting a refill of the medication below to the pharmacy below:    zolpidem (AMBIEN) 5 MG tablet    [facility] [id number] [place] [address] [Patient Preferred]   [phone]

## 2020-05-11 NOTE — BRIEF OP NOTE
CRANIOTOMY FOR TUMOR STEREOTACTIC  Progress Note    Gelacio SMITH Octavio  5/11/2020    Pre-op Diagnosis:   Brain metastases (CMS/HCC) [C79.31]       Post-Op Diagnosis Codes:     * Brain metastases (CMS/HCC) [C79.31]    Procedure/CPT® Codes:      Procedure(s):  Left frontal craniotomy for tumor using stereotactic guidance    Surgeon(s):  Francisco Mcdonough MD    Anesthesia: General    Staff:   Circulator: Aurea Brody RN; Trang Mace RN; Eva Peres RN  Scrub Person: Mayra Gama Ryan; Jovanna Cesar  Assistant: Gianna David CSA    Estimated Blood Loss: 100 mL    Urine Voided: 500 mL    Specimens:                Specimens     ID Source Type Tests Collected By Collected At Frozen?      A Brain Tissue · TISSUE PATHOLOGY EXAM   Francisco Mcdonough MD 5/11/20 1114 Yes     Description: LEFT FRONTAL LOBE MASS    Comment: PLEASE CALL [phone] WITH RESULTS    B Brain Tissue · TISSUE PATHOLOGY EXAM   Francisco Mcdonough MD 5/11/20 1136 No     Description: LEFT FRONTAL LOBE MASS    This specimen was not marked as sent.                Drains:   Urethral Catheter Silicone 16 Fr. (Active)       Findings: Metastatic melanoma    Complications: None      Francisco Mcdonough MD     Date: 5/11/2020  Time: 11:49

## 2020-05-12 ENCOUNTER — APPOINTMENT (OUTPATIENT)
Dept: MRI IMAGING | Facility: HOSPITAL | Age: 65
End: 2020-05-12

## 2020-05-12 ENCOUNTER — APPOINTMENT (OUTPATIENT)
Dept: CT IMAGING | Facility: HOSPITAL | Age: 65
End: 2020-05-12

## 2020-05-12 LAB
ANION GAP SERPL CALCULATED.3IONS-SCNC: 11 MMOL/L (ref 5–15)
APTT PPP: 30.5 SECONDS (ref 22.7–35.4)
BASOPHILS # BLD AUTO: 0.02 10*3/MM3 (ref 0–0.2)
BASOPHILS NFR BLD AUTO: 0.2 % (ref 0–1.5)
BUN BLD-MCNC: 17 MG/DL (ref 8–23)
BUN/CREAT SERPL: 15.9 (ref 7–25)
CALCIUM SPEC-SCNC: 8.3 MG/DL (ref 8.6–10.5)
CHLORIDE SERPL-SCNC: 103 MMOL/L (ref 98–107)
CO2 SERPL-SCNC: 21 MMOL/L (ref 22–29)
CREAT BLD-MCNC: 1.07 MG/DL (ref 0.76–1.27)
DEPRECATED RDW RBC AUTO: 41.6 FL (ref 37–54)
EOSINOPHIL # BLD AUTO: 0 10*3/MM3 (ref 0–0.4)
EOSINOPHIL NFR BLD AUTO: 0 % (ref 0.3–6.2)
ERYTHROCYTE [DISTWIDTH] IN BLOOD BY AUTOMATED COUNT: 13.2 % (ref 12.3–15.4)
GFR SERPL CREATININE-BSD FRML MDRD: 70 ML/MIN/1.73
GLUCOSE BLD-MCNC: 139 MG/DL (ref 65–99)
HCT VFR BLD AUTO: 36.4 % (ref 37.5–51)
HGB BLD-MCNC: 12.7 G/DL (ref 13–17.7)
IMM GRANULOCYTES # BLD AUTO: 0.09 10*3/MM3 (ref 0–0.05)
IMM GRANULOCYTES NFR BLD AUTO: 0.7 % (ref 0–0.5)
INR PPP: 1.1 (ref 0.9–1.1)
LYMPHOCYTES # BLD AUTO: 0.62 10*3/MM3 (ref 0.7–3.1)
LYMPHOCYTES NFR BLD AUTO: 5 % (ref 19.6–45.3)
MCH RBC QN AUTO: 30.5 PG (ref 26.6–33)
MCHC RBC AUTO-ENTMCNC: 34.9 G/DL (ref 31.5–35.7)
MCV RBC AUTO: 87.3 FL (ref 79–97)
MONOCYTES # BLD AUTO: 0.5 10*3/MM3 (ref 0.1–0.9)
MONOCYTES NFR BLD AUTO: 4 % (ref 5–12)
NEUTROPHILS # BLD AUTO: 11.22 10*3/MM3 (ref 1.7–7)
NEUTROPHILS NFR BLD AUTO: 90.1 % (ref 42.7–76)
NRBC BLD AUTO-RTO: 0 /100 WBC (ref 0–0.2)
PLATELET # BLD AUTO: 209 10*3/MM3 (ref 140–450)
PMV BLD AUTO: 10.3 FL (ref 6–12)
POTASSIUM BLD-SCNC: 4.2 MMOL/L (ref 3.5–5.2)
PROTHROMBIN TIME: 13.9 SECONDS (ref 11.7–14.2)
RBC # BLD AUTO: 4.17 10*6/MM3 (ref 4.14–5.8)
SODIUM BLD-SCNC: 135 MMOL/L (ref 136–145)
WBC NRBC COR # BLD: 12.45 10*3/MM3 (ref 3.4–10.8)

## 2020-05-12 PROCEDURE — A9577 INJ MULTIHANCE: HCPCS | Performed by: NEUROLOGICAL SURGERY

## 2020-05-12 PROCEDURE — 0 GADOBENATE DIMEGLUMINE 529 MG/ML SOLUTION: Performed by: NEUROLOGICAL SURGERY

## 2020-05-12 PROCEDURE — 85730 THROMBOPLASTIN TIME PARTIAL: CPT | Performed by: NEUROLOGICAL SURGERY

## 2020-05-12 PROCEDURE — 80048 BASIC METABOLIC PNL TOTAL CA: CPT | Performed by: NEUROLOGICAL SURGERY

## 2020-05-12 PROCEDURE — 70553 MRI BRAIN STEM W/O & W/DYE: CPT

## 2020-05-12 PROCEDURE — 25010000003 CEFAZOLIN IN DEXTROSE 2-4 GM/100ML-% SOLUTION: Performed by: NEUROLOGICAL SURGERY

## 2020-05-12 PROCEDURE — 86901 BLOOD TYPING SEROLOGIC RH(D): CPT

## 2020-05-12 PROCEDURE — 85610 PROTHROMBIN TIME: CPT | Performed by: NEUROLOGICAL SURGERY

## 2020-05-12 PROCEDURE — 99024 POSTOP FOLLOW-UP VISIT: CPT | Performed by: NURSE PRACTITIONER

## 2020-05-12 PROCEDURE — 86900 BLOOD TYPING SEROLOGIC ABO: CPT

## 2020-05-12 PROCEDURE — 85025 COMPLETE CBC W/AUTO DIFF WBC: CPT | Performed by: NEUROLOGICAL SURGERY

## 2020-05-12 PROCEDURE — 94799 UNLISTED PULMONARY SVC/PX: CPT

## 2020-05-12 PROCEDURE — 70450 CT HEAD/BRAIN W/O DYE: CPT

## 2020-05-12 PROCEDURE — 25810000003 SODIUM CHLORIDE 0.9 % WITH KCL 20 MEQ 20-0.9 MEQ/L-% SOLUTION: Performed by: NEUROLOGICAL SURGERY

## 2020-05-12 PROCEDURE — 97161 PT EVAL LOW COMPLEX 20 MIN: CPT

## 2020-05-12 RX ORDER — DOCUSATE SODIUM 100 MG/1
100 CAPSULE, LIQUID FILLED ORAL DAILY
Status: DISCONTINUED | OUTPATIENT
Start: 2020-05-12 | End: 2020-05-13 | Stop reason: HOSPADM

## 2020-05-12 RX ORDER — AMOXICILLIN 250 MG
2 CAPSULE ORAL NIGHTLY
Status: DISCONTINUED | OUTPATIENT
Start: 2020-05-12 | End: 2020-05-13 | Stop reason: HOSPADM

## 2020-05-12 RX ORDER — LEVETIRACETAM 500 MG/1
500 TABLET ORAL 2 TIMES DAILY
Status: DISCONTINUED | OUTPATIENT
Start: 2020-05-12 | End: 2020-05-13 | Stop reason: HOSPADM

## 2020-05-12 RX ADMIN — GADOBENATE DIMEGLUMINE 18 ML: 529 INJECTION, SOLUTION INTRAVENOUS at 13:38

## 2020-05-12 RX ADMIN — DOCUSATE SODIUM 100 MG: 100 CAPSULE, LIQUID FILLED ORAL at 10:21

## 2020-05-12 RX ADMIN — SODIUM CHLORIDE 12.5 MG/HR: 9 INJECTION, SOLUTION INTRAVENOUS at 08:02

## 2020-05-12 RX ADMIN — CEFAZOLIN SODIUM 2 G: 2 INJECTION, SOLUTION INTRAVENOUS at 10:21

## 2020-05-12 RX ADMIN — CEFAZOLIN SODIUM 2 G: 2 INJECTION, SOLUTION INTRAVENOUS at 02:25

## 2020-05-12 RX ADMIN — GABAPENTIN 300 MG: 300 CAPSULE ORAL at 20:21

## 2020-05-12 RX ADMIN — LEVETIRACETAM 500 MG: 500 TABLET, FILM COATED ORAL at 20:21

## 2020-05-12 RX ADMIN — ACETAMINOPHEN 650 MG: 325 TABLET, FILM COATED ORAL at 18:36

## 2020-05-12 RX ADMIN — DOCUSATE SODIUM 50MG AND SENNOSIDES 8.6MG 2 TABLET: 8.6; 5 TABLET, FILM COATED ORAL at 20:21

## 2020-05-12 RX ADMIN — NEBIVOLOL HYDROCHLORIDE 5 MG: 5 TABLET ORAL at 08:01

## 2020-05-12 RX ADMIN — POTASSIUM CHLORIDE AND SODIUM CHLORIDE 100 ML/HR: 900; 150 INJECTION, SOLUTION INTRAVENOUS at 03:29

## 2020-05-12 RX ADMIN — LEVETIRACETAM 500 MG: 500 TABLET, FILM COATED ORAL at 10:21

## 2020-05-12 RX ADMIN — ACETAMINOPHEN 650 MG: 325 TABLET, FILM COATED ORAL at 05:03

## 2020-05-12 RX ADMIN — SODIUM CHLORIDE 15 MG/HR: 9 INJECTION, SOLUTION INTRAVENOUS at 05:49

## 2020-05-12 RX ADMIN — SODIUM CHLORIDE 7.5 MG/HR: 9 INJECTION, SOLUTION INTRAVENOUS at 03:29

## 2020-05-12 RX ADMIN — CEFAZOLIN SODIUM 2 G: 2 INJECTION, SOLUTION INTRAVENOUS at 17:56

## 2020-05-12 NOTE — THERAPY EVALUATION
Patient Name: Gelacio Cunningham  : 1955    MRN: [id number]                              Today's Date: 2020       Admit Date: 2020    Visit Dx:     ICD-10-CM ICD-9-CM   1. Brain metastases (CMS/HCC) C79.31 198.3     Patient Active Problem List   Diagnosis   • Essential hypertension   • Age-related cognitive decline   • Encounter for screening colonoscopy   • Mixed hyperlipidemia   • Other rheumatoid arthritis with rheumatoid factor of multiple sites (CMS/HCC)   • Cancer uncertain whether primary or metastatic (CMS/HCC)   • History of melanoma   • Liver masses   • Metastatic melanoma to liver (CMS/HCC)   • Brain metastases (CMS/HCC)   • Spinal stenosis of lumbar region without neurogenic claudication   • Spondylolisthesis of lumbar region     Past Medical History:   Diagnosis Date   • Arthritis     Rheumatoid arthritis multiple sites   • Cancer (CMS/HCC) 2017    melanoma, right ear, lung, brain, bone   • DDD (degenerative disc disease), lumbar    • H/O Liver masses    • Hypertension    • Liver disease     liver lesions   • Metastatic melanoma to liver (CMS/HCC)    • Neck mass, left    • Rheumatoid arthritis (CMS/HCC)     has had 14 years, was on Methotrexate, Embrel, Humira and now Rituxin     Past Surgical History:   Procedure Laterality Date   • COLONOSCOPY N/A 2016    Procedure: COLONOSCOPY TO CECUM WITH POLYPECTOMY (COLD BIOPSY);  Surgeon: Antolin Munoz Jr., MD;  Location: [facility] ENDOSCOPY;  Service:    • COLONOSCOPY N/A 2019    Procedure: COLONOSCOPY TO CECUM;  Surgeon: Eduardo Rashid MD;  Location: [facility] ENDOSCOPY;  Service: General   • CRANIOTOMY FOR TUMOR Left 2020    Procedure: Left frontal craniotomy for tumor using stereotactic guidance;  Surgeon: Francisco Mcdonough MD;  Location: [facility] MAIN OR;  Service: Neurosurgery;  Laterality: Left;   • LIVER BIOPSY     • SKIN CANCER EXCISION       General Information     Row Name 20 1546          PT Evaluation  Time/Intention    Document Type  evaluation  -PC     Mode of Treatment  physical therapy  -PC     Row Name 05/12/20 1546          General Information    Prior Level of Function  independent:  -PC     Existing Precautions/Restrictions  fall  -PC     Row Name 05/12/20 1546          Cognitive Assessment/Intervention- PT/OT    Orientation Status (Cognition)  oriented x 4  -PC       User Key  (r) = Recorded By, (t) = Taken By, (c) = Cosigned By    Initials Name Provider Type    PC Kristel Kwok, PT Physical Therapist        Mobility     Row Name 05/12/20 1548          Bed Mobility Assessment/Treatment    Bed Mobility Assessment/Treatment  bed mobility (all) activities  -PC     [place] Level (Bed Mobility)  independent  -PC     Row Name 05/12/20 1548          Sit-Stand Transfer    Sit-Stand [place] (Transfers)  independent  -PC     Row Name 05/12/20 1548          Gait/Stairs Assessment/Training    [place] Level (Gait)  supervision  -PC     Distance in Feet (Gait)  300 ft  -PC     Pattern (Gait)  step-through  -PC     Comment (Gait/Stairs)  normal gait pattern with no loss of balance  -PC       User Key  (r) = Recorded By, (t) = Taken By, (c) = Cosigned By    Initials Name Provider Type    PC Kristel Kwok, PT Physical Therapist        Obj/Interventions     Row Name 05/12/20 1549          General ROM    GENERAL ROM COMMENTS  WFL  -PC     Row Name 05/12/20 1549          MMT (Manual Muscle Testing)    General MMT Comments  WNL  -PC     Row Name 05/12/20 1549          Static Sitting Balance    Level of [place] (Unsupported Sitting, Static Balance)  independent  -PC     Row Name 05/12/20 1549          Dynamic Sitting Balance    Level of [place], Reaches Outside Midline (Sitting, Dynamic Balance)  independent  -PC     Row Name 05/12/20 1549          Static Standing Balance    Level of [place] (Supported Standing, Static Balance)  independent  -PC     Row Name 05/12/20 1549          Dynamic  Standing Balance    Level of [place], Reaches Outside Midline (Standing, Dynamic Balance)  supervision  -PC       User Key  (r) = Recorded By, (t) = Taken By, (c) = Cosigned By    Initials Name Provider Type    PC Kristel Kwok, PT Physical Therapist        Goals/Plan    No documentation.       Clinical Impression     Row Name 05/12/20 1545          Plan of Care Review    Plan of Care Reviewed With  patient  -PC     Outcome Summary  pt presents s/p crani for resection of brain tumor, he is doing extremely well, he was independent with bed mobility and transfers, able to walk 300 ft with supervision with no loss of balance and normal gait pattern, no further PT needs at this time, pt safe to walk with nursing staff until discharge  -PC     Row Name 05/12/20 3708          Physical Therapy Clinical Impression    Criteria for Skilled Interventions Met (PT Clinical Impression)  no problems identified which require skilled intervention  -PC     Row Name 05/12/20 2911          Positioning and Restraints    Pre-Treatment Position  in bed  -PC     Post Treatment Position  bathroom  -PC     Bathroom  sitting;call light within reach;encouraged to call for assist  -PC       User Key  (r) = Recorded By, (t) = Taken By, (c) = Cosigned By    Initials Name Provider Type    PC Kristel Kwok, PT Physical Therapist        Outcome Measures     Row Name 05/12/20 4723          How much help from another person do you currently need...    Turning from your back to your side while in flat bed without using bedrails?  4  -PC     Moving from lying on back to sitting on the side of a flat bed without bedrails?  4  -PC     Moving to and from a bed to a chair (including a wheelchair)?  4  -PC     Standing up from a chair using your arms (e.g., wheelchair, bedside chair)?  4  -PC     Climbing 3-5 steps with a railing?  3  -PC     To walk in hospital room?  3  -PC     AM-PAC 6 Clicks Score (PT)  22  -PC     Row Name 05/12/20 9818           Functional Assessment    Outcome Measure Options  AM-PAC 6 Clicks Basic Mobility (PT)  -PC       User Key  (r) = Recorded By, (t) = Taken By, (c) = Cosigned By    Initials Name Provider Type    PC Kristel Kwok PT Physical Therapist        Physical Therapy Education                 Title: PT OT SLP Therapies (Done)     Topic: Physical Therapy (Done)     Point: Mobility training (Done)     Description:   Instruct learner(s) on safety and technique for assisting patient out of bed, chair or wheelchair.  Instruct in the proper use of assistive devices, such as walker, crutches, cane or brace.              Patient Friendly Description:   It's important to get you on your feet again, but we need to do so in a way that is safe for you. Falling has serious consequences, and your personal safety is the most important thing of all.        When it's time to get out of bed, one of us or a family member will sit next to you on the bed to give you support.     If your doctor or nurse tells you to use a walker, crutches, a cane, or a brace, be sure you use it every time you get out of bed, even if you think you don't need it.    Learning Progress Summary           Patient Acceptance, E,D, DU by  at 5/12/2020 8553                               User Key     Initials Effective Dates Name Provider Type Discipline     04/03/18 -  Kristel Kwok PT Physical Therapist PT              PT Recommendation and Plan     Outcome Summary/Treatment Plan (PT)  Anticipated Discharge Disposition (PT): home with assist  Plan of Care Reviewed With: patient  Outcome Summary: pt presents s/p crani for resection of brain tumor, he is doing extremely well, he was independent with bed mobility and transfers, able to walk 300 ft with supervision with no loss of balance and normal gait pattern, no further PT needs at this time, pt safe to walk with nursing staff until discharge     Time Calculation:   PT Charges     Row Name 05/12/20 9211              Time Calculation    Start Time  1430  -PC      Stop Time  1445  -PC      Time Calculation (min)  15 min  -PC      PT Received On  05/12/20  -PC         Time Calculation- PT    Total Timed Code Minutes- PT  --  -PC        User Key  (r) = Recorded By, (t) = Taken By, (c) = Cosigned By    Initials Name Provider Type    PC Kristel Kwok, PT Physical Therapist        Therapy Charges for Today     Code Description Service Date Service Provider Modifiers Qty    70675349398 HC PT EVAL LOW COMPLEXITY 2 5/12/2020 Kristel Kwok, PT GP 1          PT G-Codes  Outcome Measure Options: AM-PAC 6 Clicks Basic Mobility (PT)  AM-PAC 6 Clicks Score (PT): 22    Kristel Kwok PT  5/12/2020

## 2020-05-12 NOTE — PROGRESS NOTES
Jose Abad MD                          [phone]      Patient ID:    Name:  Gelacio Cunningham    MRN:  [id number]    1955   65 y.o.  male            Patient Care Team:  Favio Guaman MD as PCP - Albin Pacheco MD as Consulting Physician (Hematology and Oncology)  Favio Guaman MD as Referring Physician (Internal Medicine)  Favio Guaman MD as Referring Physician (Internal Medicine)  Favio Islas MD (Internal Medicine)    CC/ Reason for visit: Postop craniotomy, malignant melanoma follow-up    Subjective: Pt seen and examined this AM. No acute overnight events noted. Doing better.  States that he is doing better and pain is well controlled.  Drain is still in place.  Blood pressures controlled.    ROS: Denies any subjective fevers, syncope or presyncopal events, new neurological deficits, nausea or vomiting currently    Objective     Vital Signs past 24hrs    BP range: BP: ()/(56-75) 119/64  Pulse range: Heart Rate:  [] 76  Resp rate range: Resp:  [16] 16  Temp range: Temp (24hrs), Av °F (36.7 °C), Min:97.4 °F (36.3 °C), Max:98.2 °F (36.8 °C)      Ventilator/Non-Invasive Ventilation Settings (From admission, onward)    None          Device (Oxygen Therapy): nasal cannula       89.4 kg (197 lb 1.5 oz); Body mass index is 29.97 kg/m².      Intake/Output Summary (Last 24 hours) at 2020 1504  Last data filed at 2020 1130  Gross per 24 hour   Intake 1786 ml   Output 1480 ml   Net 306 ml       PHYSICAL EXAM   Constitutional: Middle aged pt in bed, No acute respiratory distress, no accessory muscle use  Head: -Postop cranial changes wound with drain +  Eyes: No pallor, Anicteric conjunctiva, EOMI.  ENMT:  Mallampati 4, no oral thrush. Moist MM.   NECK: Trachea midline, No thyromegaly, no palpable cervical LNpathy  Heart: RRR, no murmur. Trace pedal edema   Lungs: PATTI +, No wheezes/ crackles  heard    Abdomen: Soft. No tenderness, guarding or rigidity. No palpable masses  Extremities: Extremities warm and well perfused. No cyanosis/ clubbing  Neuro: Conscious, answers appropriately, no gross focal neuro deficits  Psych: Mood and affect appropriate    Scheduled meds:    ceFAZolin 2 g Intravenous Q8H   docusate sodium 100 mg Oral Daily   gabapentin 300 mg Oral Nightly   levETIRAcetam 500 mg Oral BID   nebivolol 5 mg Oral Daily   sennosides-docusate 2 tablet Oral Nightly       IV meds:                        niCARdipine 5-15 mg/hr Last Rate: Stopped (05/12/20 1017)       Data Review:      Results from last 7 days   Lab Units 05/12/20  0431 05/11/20  1548 05/08/20  1029 05/08/20  0725   SODIUM mmol/L 135* 135*  --   --    POTASSIUM mmol/L 4.2 3.6  --   --    CHLORIDE mmol/L 103 104  --   --    CO2 mmol/L 21.0* 18.8*  --   --    BUN mg/dL 17 11  --   --    CREATININE mg/dL 1.07 0.86 1.10  --    CALCIUM mg/dL 8.3* 7.6*  --   --    GLUCOSE mg/dL 139* 123*  --   --    WBC 10*3/mm3 12.45* 8.07  --  6.70   HEMOGLOBIN g/dL 12.7* 13.2  --  13.8   PLATELETS 10*3/mm3 209 198  --  240   INR  1.10  --   --   --        Lab Results   Component Value Date    CALCIUM 8.3 (L) 05/12/2020                    Results Review:    I have reviewed the relevant laboratory results and independently reviewed the chest imaging from this hospitalization including the available echocardiogram reports personally and summarized it if/ when appropriate below    Assessment    Left frontal metastatic melanoma s/p Crani with resection  Postoperative pain managed  Reactive leukocytosis  Malignant melanoma liver, bone and brain mets s/p Immuno Rx   RA on Rituxan  Emphysema/COPD not in exacerbation  Incidental 9 mm left upper lobe lung nodule -continue outpatient follow-up CAT scan  CKD    PLAN:  Patient is doing well from a postoperative standpoint.  Arterial line is discontinued.  We will DC supplemental oxygen as his sats are doing fairly  well  Elevated white count likely reactive and we will continue to observe.  No current concerns for infection  PRN pain control medication  Blood pressure control per recommendations  Advance diet per primary service    I have discussed my findings and recommendations with patient.     Jose Abad MD  5/12/2020

## 2020-05-12 NOTE — PROGRESS NOTES
[facility] NEUROSURGERY PROGRESS NOTE    PATIENT IDENTIFICATION:   Name:  Gelacio Cunningham      MRN:  [id number]     65 y.o.  male               CC: POD 1 left frontal crani for tumor      Subjective     Interval History:  Minimal HA. Tolerating CL diet. On cardene for BP<120. Had CTH    ROS:  Constitutional: No fever, chills  HEENT: minimal headache, no vision changes  GI: No nausea, vomiting, no swallow difficulties  Neuro: No numbness, tingling, or weakness, no speech difficulties  Objective     Vital signs in last 24 hours:  Temp:  [97.8 °F (36.6 °C)-98.2 °F (36.8 °C)] 97.9 °F (36.6 °C)  Heart Rate:  [] 82  Resp:  [12-18] 16  BP: ()/(56-87) 100/57  Arterial Line BP: ()/(34-74) 111/54      Intake/Output this shift:  No intake/output data recorded.      Intake/Output last 3 shifts:  I/O last 3 completed shifts:  In: 2936 [P.O.:650; I.V.:2286]  Out: 1300 [Urine:1100; Drains:100; Blood:100]  NGOC- 100 cc overnight; 60 cc since 0430    LABS:  Results from last 7 days   Lab Units 05/12/20 0431 05/11/20  1548 05/08/20  0725   WBC 10*3/mm3 12.45* 8.07 6.70   HEMOGLOBIN g/dL 12.7* 13.2 13.8   HEMATOCRIT % 36.4* 38.0 40.0   PLATELETS 10*3/mm3 209 198 240     Results from last 7 days   Lab Units 05/12/20 0431 05/11/20  1548 05/08/20  1029   SODIUM mmol/L 135* 135*  --    POTASSIUM mmol/L 4.2 3.6  --    CHLORIDE mmol/L 103 104  --    CO2 mmol/L 21.0* 18.8*  --    BUN mg/dL 17 11  --    CREATININE mg/dL 1.07 0.86 1.10   CALCIUM mg/dL 8.3* 7.6*  --    GLUCOSE mg/dL 139* 123*  --      Results from last 7 days   Lab Units 05/12/20 0431   INR  1.10     Lab Results   Lab Value Date/Time    PTT 30.5 05/12/2020 0431    PTT 31.0 11/28/2019 1219          IMAGING STUDIES:  CTH-postoperative changes left frontal with small amount of hemorrhage within operative bed.  Some edema but no significant mass-effect or midline shift    I personally viewed and interpreted the patient's CTH.  Also reviewed by discussed with   Ceasar.    Meds reviewed/changed: Yes    Current Facility-Administered Medications:   •  acetaminophen (TYLENOL) tablet 650 mg, 650 mg, Oral, Q6H PRN, Francisco Mcdonough MD, 650 mg at 05/12/20 0503  •  ceFAZolin in dextrose (ANCEF) IVPB solution 2 g, 2 g, Intravenous, Q8H, Francisco Mcdonough MD, 2 g at 05/12/20 0225  •  cyclobenzaprine (FLEXERIL) tablet 10 mg, 10 mg, Oral, Nightly PRN, Francisco Mcdonough MD  •  gabapentin (NEURONTIN) capsule 300 mg, 300 mg, Oral, Nightly, Francisco Mcdonough MD, 300 mg at 05/11/20 2038  •  HYDROcodone-acetaminophen (NORCO) 5-325 MG per tablet 1 tablet, 1 tablet, Oral, Q4H PRN, Francisco Mcdonough MD  •  HYDROmorphone (DILAUDID) injection 0.5 mg, 0.5 mg, Intravenous, Q2H PRN **AND** naloxone (NARCAN) injection 0.4 mg, 0.4 mg, Intravenous, Q5 Min PRN, Francisco Mcdonough MD  •  nebivolol (BYSTOLIC) tablet 5 mg, 5 mg, Oral, Daily, Francisco Mcdonough MD, 5 mg at 05/12/20 0801  •  niCARdipine (CARDENE) 25 mg in 250 mL NS (0.1 mg/mL) infusion kit, 5-15 mg/hr, Intravenous, Titrated, Francisco Mcdonough MD, Last Rate: 125 mL/hr at 05/12/20 0802, 12.5 mg/hr at 05/12/20 0802  •  ondansetron (ZOFRAN) tablet 4 mg, 4 mg, Oral, Q6H PRN **OR** ondansetron (ZOFRAN) injection 4 mg, 4 mg, Intravenous, Q6H PRN, Francisco Mcdonough MD  •  ondansetron (ZOFRAN) tablet 8 mg, 8 mg, Oral, Q8H PRN, Francisco Mcdonough MD  •  sodium chloride 0.9 % with KCl 20 mEq/L infusion, 100 mL/hr, Intravenous, Continuous, Francisco Mcdonough MD, Last Rate: 100 mL/hr at 05/12/20 0329, 100 mL/hr at 05/12/20 0329  •  temazepam (RESTORIL) capsule 15 mg, 15 mg, Oral, Nightly PRN, Francisco Mcdonough MD  •  thrombin (THROMBIN-JMI) spray kit, , , PRN, Francisco Mcdonough MD, 5,000 Units at 05/11/20 1048      Physical Exam:    General:   Awake, alert, oriented x3. Speech clear with no     aphasia  HEENT:    Frontal crani incision well approximated with no     R/E/D. NGOC to bulb sx with sanginous output    CN III IV VI: Extraocular movements are full ,  "PERRL   CN VII: Facial movements are symmetric. No weakness.      Motor: No drift  Coordination:  Finger to nose test shows no dysmetria.    Extremities:   Wearing SCD    Assessment/Plan     ASSESSMENT:      Brain metastases (CMS/HCC)      PLAN: Patient doing well postop day 1.  Minimal headache.  No neurologic deficits.  CT head reveals postoperative changes as expected.  Will order MRI today.  DC [place].  Relax blood pressure parameters to less than 140 with hopes to wean off Cardene.  Advance diet.  Keep NGOC for now as he had 60 cc in the last 4 hours, but will watch his output.  If it starts to clear to more fluid, will likely DC later today.  Anticipation is if he does well with physical therapy and meets all other parameters, may discharge home tomorrow.  I spoke with his wife via phone.    I discussed the patients findings and my recommendations with patient, family, nursing staff and Dr. Mcdonough       LOS: 1 day       Una Noble, APRN  5/12/2020  08:14    \"Dictated utilizing Dragon dictation\".      "

## 2020-05-12 NOTE — PROGRESS NOTES
Discharge Planning Assessment  [facility]     Patient Name: Gelacio Cunningham  MRN: [id number]  Today's Date: 5/12/2020    Admit Date: 5/11/2020    Discharge Needs Assessment     Row Name 05/12/20 1240       Living Environment    Lives With  spouse    Name(s) of Who Lives With Patient  wife, Angelique Cunningham, [phone]    Current Living Arrangements  home/apartment/condo    Primary Care Provided by  self    Provides Primary Care For  no one    Family Caregiver if Needed  spouse    Quality of Family Relationships  helpful;involved;supportive    Able to Return to Prior Arrangements  yes       Resource/Environmental Concerns    Resource/Environmental Concerns  none       Transition Planning    Patient/Family Anticipates Transition to  home with family    Patient/Family Anticipated Services at Transition  none    Transportation Anticipated  family or friend will provide       Discharge Needs Assessment    Concerns to be Addressed  discharge planning    Equipment Currently Used at Home  cane, straight;none    Discharge Coordination/Progress  Home        Discharge Plan     Row Name 05/12/20 4811       Plan    Plan  Home with wife, follow for needs    Patient/Family in Agreement with Plan  yes    Plan Comments  CCP met with pt to discuss d/c planning. Facesheet verified. CCP role explained. Pt resides with his wife in a single level home, and denies DME use, past home health, and past sub-acute rehab. Pt confirms pharmacy is [facility] but opts for Meds to Beds enrollment (confirmed in EPIC). Pt anticipates no needs at d/c and states he has canes at home if needed. PT eval pending. CCP to follow to assist should d/c needs arise. Odalys Cox LCSW        Destination      Coordination has not been started for this encounter.      Durable Medical Equipment      Coordination has not been started for this encounter.      Dialysis/Infusion      Coordination has not been started for this encounter.      Home Medical  Care      Coordination has not been started for this encounter.      Therapy      Coordination has not been started for this encounter.      Community Resources      Coordination has not been started for this encounter.          Demographic Summary     Row Name 05/12/20 1239       General Information    Admission Type  inpatient    Arrived From  home    Referral Source  admission list    Reason for Consult  discharge planning    Preferred Language  English        Functional Status     Row Name 05/12/20 1239       Functional Status    Usual Activity Tolerance  good    Current Activity Tolerance  good       Functional Status, IADL    Medications  independent    Meal Preparation  independent    Housekeeping  independent    Laundry  independent    Shopping  independent       Mental Status Summary    Recent Changes in Mental Status/Cognitive Functioning  no changes        Psychosocial    No documentation.       Abuse/Neglect    No documentation.       Legal    No documentation.       Substance Abuse    No documentation.       Patient Forms    No documentation.           Harriett Cox LCSW

## 2020-05-12 NOTE — PLAN OF CARE
Pt recovering well from craniotomy yesterday. Cardene drip titrated down then stopped. VSS. Pt ambulated around unit with little assistance. Pt rested well all day.

## 2020-05-12 NOTE — PLAN OF CARE
Patient continues to be A&O x4. Incision clean, dry & intact. No new complaints, nicardipine titrated per protocol fto maintain SBP < 120.

## 2020-05-12 NOTE — PLAN OF CARE
Problem: Patient Care Overview  Goal: Plan of Care Review  Outcome: Ongoing (interventions implemented as appropriate)  Flowsheets (Taken 5/12/2020 2226)  Outcome Summary: pt presents s/p crani for resection of brain tumor, he is doing extremely well with mobility, he was independent with bed mobility and transfers, able to walk 300 ft with supervision with no loss of balance and normal gait pattern, no further PT needs at this time, pt safe to walk with nursing staff until discharge    ..Patient was wearing a face mask during this therapy encounter. Therapist used appropriate personal protective equipment including mask and gloves.  Mask used was standard procedure mask. Appropriate PPE was worn during the entire therapy session. Hand hygiene was completed before and after therapy session. Patient is not in enhanced droplet precautions.

## 2020-05-13 ENCOUNTER — READMISSION MANAGEMENT (OUTPATIENT)
Dept: CALL CENTER | Facility: HOSPITAL | Age: 65
End: 2020-05-13

## 2020-05-13 VITALS
SYSTOLIC BLOOD PRESSURE: 144 MMHG | DIASTOLIC BLOOD PRESSURE: 80 MMHG | BODY MASS INDEX: 29.87 KG/M2 | TEMPERATURE: 98 F | OXYGEN SATURATION: 94 % | RESPIRATION RATE: 16 BRPM | HEART RATE: 64 BPM | HEIGHT: 68 IN | WEIGHT: 197.09 LBS

## 2020-05-13 LAB
ANION GAP SERPL CALCULATED.3IONS-SCNC: 9.3 MMOL/L (ref 5–15)
BASOPHILS # BLD AUTO: 0.01 10*3/MM3 (ref 0–0.2)
BASOPHILS NFR BLD AUTO: 0.1 % (ref 0–1.5)
BUN BLD-MCNC: 16 MG/DL (ref 8–23)
BUN/CREAT SERPL: 16.3 (ref 7–25)
CALCIUM SPEC-SCNC: 8.7 MG/DL (ref 8.6–10.5)
CHLORIDE SERPL-SCNC: 105 MMOL/L (ref 98–107)
CO2 SERPL-SCNC: 25.7 MMOL/L (ref 22–29)
CREAT BLD-MCNC: 0.98 MG/DL (ref 0.76–1.27)
CYTO UR: NORMAL
DEPRECATED RDW RBC AUTO: 40.7 FL (ref 37–54)
EOSINOPHIL # BLD AUTO: 0 10*3/MM3 (ref 0–0.4)
EOSINOPHIL NFR BLD AUTO: 0 % (ref 0.3–6.2)
ERYTHROCYTE [DISTWIDTH] IN BLOOD BY AUTOMATED COUNT: 12.8 % (ref 12.3–15.4)
GFR SERPL CREATININE-BSD FRML MDRD: 77 ML/MIN/1.73
GLUCOSE BLD-MCNC: 110 MG/DL (ref 65–99)
HCT VFR BLD AUTO: 36 % (ref 37.5–51)
HGB BLD-MCNC: 12 G/DL (ref 13–17.7)
IMM GRANULOCYTES # BLD AUTO: 0.06 10*3/MM3 (ref 0–0.05)
IMM GRANULOCYTES NFR BLD AUTO: 0.5 % (ref 0–0.5)
LAB AP CASE REPORT: NORMAL
LAB AP CLINICAL INFORMATION: NORMAL
LAB AP DIAGNOSIS COMMENT: NORMAL
LAB AP SPECIAL STAINS: NORMAL
LYMPHOCYTES # BLD AUTO: 1.19 10*3/MM3 (ref 0.7–3.1)
LYMPHOCYTES NFR BLD AUTO: 10.6 % (ref 19.6–45.3)
Lab: NORMAL
MCH RBC QN AUTO: 29.1 PG (ref 26.6–33)
MCHC RBC AUTO-ENTMCNC: 33.3 G/DL (ref 31.5–35.7)
MCV RBC AUTO: 87.2 FL (ref 79–97)
MONOCYTES # BLD AUTO: 0.69 10*3/MM3 (ref 0.1–0.9)
MONOCYTES NFR BLD AUTO: 6.2 % (ref 5–12)
NEUTROPHILS # BLD AUTO: 9.23 10*3/MM3 (ref 1.7–7)
NEUTROPHILS NFR BLD AUTO: 82.6 % (ref 42.7–76)
NRBC BLD AUTO-RTO: 0 /100 WBC (ref 0–0.2)
PATH REPORT.FINAL DX SPEC: NORMAL
PATH REPORT.GROSS SPEC: NORMAL
PLATELET # BLD AUTO: 189 10*3/MM3 (ref 140–450)
PMV BLD AUTO: 10 FL (ref 6–12)
POTASSIUM BLD-SCNC: 4.1 MMOL/L (ref 3.5–5.2)
RBC # BLD AUTO: 4.13 10*6/MM3 (ref 4.14–5.8)
SODIUM BLD-SCNC: 140 MMOL/L (ref 136–145)
WBC NRBC COR # BLD: 11.18 10*3/MM3 (ref 3.4–10.8)

## 2020-05-13 PROCEDURE — 94799 UNLISTED PULMONARY SVC/PX: CPT

## 2020-05-13 PROCEDURE — 85025 COMPLETE CBC W/AUTO DIFF WBC: CPT | Performed by: NURSE PRACTITIONER

## 2020-05-13 PROCEDURE — 25010000003 CEFAZOLIN IN DEXTROSE 2-4 GM/100ML-% SOLUTION: Performed by: NURSE PRACTITIONER

## 2020-05-13 PROCEDURE — 80048 BASIC METABOLIC PNL TOTAL CA: CPT | Performed by: NURSE PRACTITIONER

## 2020-05-13 RX ORDER — LEVETIRACETAM 500 MG/1
500 TABLET ORAL 2 TIMES DAILY
Qty: 60 TABLET | Refills: 3 | Status: SHIPPED | OUTPATIENT
Start: 2020-05-13 | End: 2020-06-02

## 2020-05-13 RX ADMIN — LEVETIRACETAM 500 MG: 500 TABLET, FILM COATED ORAL at 08:40

## 2020-05-13 RX ADMIN — CEFAZOLIN SODIUM 2 G: 2 INJECTION, SOLUTION INTRAVENOUS at 01:48

## 2020-05-13 RX ADMIN — CEFAZOLIN SODIUM 2 G: 2 INJECTION, SOLUTION INTRAVENOUS at 11:17

## 2020-05-13 RX ADMIN — NEBIVOLOL HYDROCHLORIDE 5 MG: 5 TABLET ORAL at 08:40

## 2020-05-13 RX ADMIN — DOCUSATE SODIUM 100 MG: 100 CAPSULE, LIQUID FILLED ORAL at 08:40

## 2020-05-13 NOTE — PROGRESS NOTES
Jose Abad MD                          [phone]      Patient ID:    Name:  Gelacio Cunningham    MRN:  [id number]    1955   65 y.o.  male            Patient Care Team:  Favio Guaman MD as PCP - Albin Pacheco MD as Consulting Physician (Hematology and Oncology)  Favio Guaman MD as Referring Physician (Internal Medicine)  Favio Guaman MD as Referring Physician (Internal Medicine)  Favio Islas MD (Internal Medicine)    CC/ Reason for visit: Postop craniotomy, malignant melanoma follow-up    Subjective: Pt seen and examined this AM. No acute overnight events noted. Doing better.  States that he is doing better and pain is well controlled.  Drain is still in place.  Blood pressures controlled.    ROS: Denies any subjective fevers, syncope or presyncopal events, new neurological deficits, nausea or vomiting currently    Objective     Vital Signs past 24hrs    BP range: BP: (116-144)/(61-80) 144/80  Pulse range: Heart Rate:  [54-77] 64  Resp rate range: Resp:  [16] 16  Temp range: Temp (24hrs), Av.8 °F (36.6 °C), Min:97.6 °F (36.4 °C), Max:98.1 °F (36.7 °C)      Ventilator/Non-Invasive Ventilation Settings (From admission, onward)    None          Device (Oxygen Therapy): room air       89.4 kg (197 lb 1.5 oz); Body mass index is 29.97 kg/m².      Intake/Output Summary (Last 24 hours) at 2020 1619  Last data filed at 2020 0900  Gross per 24 hour   Intake 720 ml   Output 2105 ml   Net -1385 ml       PHYSICAL EXAM   Constitutional: Middle aged pt in bed, No acute respiratory distress, no accessory muscle use  Head: -Postop cranial changes wound with drain +  Eyes: No pallor, Anicteric conjunctiva, EOMI.  ENMT:  Mallampati 4, no oral thrush. Moist MM.   NECK: Trachea midline, No thyromegaly, no palpable cervical LNpathy  Heart: RRR, no murmur. Trace pedal edema   Lungs: PATTI +, No wheezes/ crackles  heard    Abdomen: Soft. No tenderness, guarding or rigidity. No palpable masses  Extremities: Extremities warm and well perfused. No cyanosis/ clubbing  Neuro: Conscious, answers appropriately, no gross focal neuro deficits  Psych: Mood and affect appropriate    Scheduled meds:      docusate sodium 100 mg Oral Daily   gabapentin 300 mg Oral Nightly   levETIRAcetam 500 mg Oral BID   nebivolol 5 mg Oral Daily   sennosides-docusate 2 tablet Oral Nightly       IV meds:                           Data Review:      Results from last 7 days   Lab Units 05/13/20  0605 05/12/20  0431 05/11/20  1548   SODIUM mmol/L 140 135* 135*   POTASSIUM mmol/L 4.1 4.2 3.6   CHLORIDE mmol/L 105 103 104   CO2 mmol/L 25.7 21.0* 18.8*   BUN mg/dL 16 17 11   CREATININE mg/dL 0.98 1.07 0.86   CALCIUM mg/dL 8.7 8.3* 7.6*   GLUCOSE mg/dL 110* 139* 123*   WBC 10*3/mm3 11.18* 12.45* 8.07   HEMOGLOBIN g/dL 12.0* 12.7* 13.2   PLATELETS 10*3/mm3 189 209 198   INR   --  1.10  --        Lab Results   Component Value Date    CALCIUM 8.7 05/13/2020                    Results Review:    I have reviewed the relevant laboratory results and independently reviewed the chest imaging from this hospitalization including the available echocardiogram reports personally and summarized it if/ when appropriate below    Assessment    Left frontal metastatic melanoma s/p Crani with resection  Postoperative pain managed  Reactive leukocytosis  Malignant melanoma liver, bone and brain mets s/p Immuno Rx   RA on Rituxan  Emphysema/COPD not in exacerbation  Incidental 9 mm left upper lobe lung nodule -continue outpatient follow-up CAT scan  CKD    PLAN:  Patient is doing well from a postoperative standpoint.   Elevated white count likely reactive and we will continue to observe.  No current concerns for infection  PRN pain control medication  Blood pressure control per recommendations    Okay to discharge the patient home from my end    I have discussed my findings and  recommendations with patient.     Jose Abad MD  5/13/2020

## 2020-05-13 NOTE — OUTREACH NOTE
Prep Survey      Responses   [facility] patient discharged from?  [facility]   Is LACE score < 7 ?  No   Eligibility  [facility]   Date of Admission  05/11/20   Date of Discharge  05/13/20   Discharge Disposition  Home or Self Care   Discharge diagnosis  Brain metastases,    Left frontal craniotomy for tumor using stereotactic guidance   COVID-19 Test Status  Negative   Does the patient have one of the following disease processes/diagnoses(primary or secondary)?  General Surgery   Does the patient have Home health ordered?  No   Is there a DME ordered?  No   Prep survey completed?  Yes          Jacquelyn Aguilar RN

## 2020-05-13 NOTE — DISCHARGE SUMMARY
Gelacio Cunningham  1955    Patient Care Team:  Favio Guaman MD as PCP - General  Albin Hummel MD as Consulting Physician (Hematology and Oncology)  Favio Guaman MD as Referring Physician (Internal Medicine)  Favio Guaman MD as Referring Physician (Internal Medicine)  Favio Islas MD (Internal Medicine)    Date of Admit: 5/11/2020    Date of Discharge:  5/13/2020    Discharge Diagnosis:  Brain metastases (CMS/HCC)      Procedures Performed  Procedure(s):  Left frontal craniotomy for tumor using stereotactic guidance       Complications: NONE    Consultants:   Consults     Date and Time Order Name Status Description    5/11/2020 1232 Inpatient Consult to Intensivist Completed           Condition on Discharge: stable    Discharge disposition: home    Pertinent Test Results: Surgical pathology dated May 11, 2020 is still pending.    Brief HPI: This is a very pleasant 65-year-old male with a history of malignant melanoma. His oncologist is Dr. Albin Hummel. He began to develop worsening headaches.  His oncologist ordered an MRI of the brain which revealed a left frontal pole enhancing mass.  Due to these findings and the above history, the patient was brought to the operating electively for the above-stated surgical procedure.  Please see admission H&P for further details.      Hospital Course: Mr. Cunningham is doing quite well.  He was moved out of the intensive care unit yesterday.  The postoperative subgaleal drain is putting out minimal amounts and will be discontinued today.  The cranial incision is well approximated.  There is no redness, swelling, or drainage.  The patient is ambulating about his room without difficulty.  His Macias catheter was removed yesterday and he is voiding without difficulty.  He is eating and tolerating a diet without difficulty.  His pain is well controlled with plain Tylenol.  He has not required any Tylenol today.  His postoperative MRI of  the brain performed with and without contrast showed no definite residual tumor.  Surgical pathology is still pending but given his history we are quite certain that will be positive for metastatic melanoma.  The results will be reviewed with the patient by his oncologist and can be discussed further at his follow-up appointment.  He did have some postoperative leukocytosis.  White blood cell count was 12.45 yesterday and today it is reduced to 11.18. The patient is remained afebrile.  His vital signs have been stable.  The patient feels ready for discharge home today.  Both he and Dr. Mcdonough are in agreement with that plan.       Discharge Physical Exam:    Temp:  [97.6 °F (36.4 °C)-98.1 °F (36.7 °C)] 98 °F (36.7 °C)  Heart Rate:  [54-77] 64  Resp:  [16] 16  BP: (114-144)/(61-80) 144/80    Current labs:  Lab Results (last 24 hours)     Procedure Component Value Units Date/Time    CBC & Differential [[id number]] Collected:  05/13/20 0605    Specimen:  Blood Updated:  05/13/20 0626    Narrative:       The following orders were created for panel order CBC & Differential.  Procedure                               Abnormality         Status                     ---------                               -----------         ------                     CBC Auto Differential[[id number]]        Abnormal            Final result                 Please view results for these tests on the individual orders.    Basic Metabolic Panel [[id number]]  (Abnormal) Collected:  05/13/20 0605    Specimen:  Blood Updated:  05/13/20 0652     Glucose 110 mg/dL      BUN 16 mg/dL      Creatinine 0.98 mg/dL      Sodium 140 mmol/L      Potassium 4.1 mmol/L      Chloride 105 mmol/L      CO2 25.7 mmol/L      Calcium 8.7 mg/dL      eGFR Non African Amer 77 mL/min/1.73      BUN/Creatinine Ratio 16.3     Anion Gap 9.3 mmol/L     Narrative:       GFR Normal >60  Chronic Kidney Disease <60  Kidney Failure <15      CBC Auto Differential [[id number]]  (Abnormal)  Collected:  05/13/20 0605    Specimen:  Blood Updated:  05/13/20 0626     WBC 11.18 10*3/mm3      RBC 4.13 10*6/mm3      Hemoglobin 12.0 g/dL      Hematocrit 36.0 %      MCV 87.2 fL      MCH 29.1 pg      MCHC 33.3 g/dL      RDW 12.8 %      RDW-SD 40.7 fl      MPV 10.0 fL      Platelets 189 10*3/mm3      Neutrophil % 82.6 %      Lymphocyte % 10.6 %      Monocyte % 6.2 %      Eosinophil % 0.0 %      Basophil % 0.1 %      Immature Grans % 0.5 %      Neutrophils, Absolute 9.23 10*3/mm3      Lymphocytes, Absolute 1.19 10*3/mm3      Monocytes, Absolute 0.69 10*3/mm3      Eosinophils, Absolute 0.00 10*3/mm3      Basophils, Absolute 0.01 10*3/mm3      Immature Grans, Absolute 0.06 10*3/mm3      nRBC 0.0 /100 WBC             General Appearance No acute distress   HEENT NC/AT;    Neurological Awake, Alert, and oriented x 3   Cranial nerves CN II-XII intact   Motor Strength normal, tone normal   Sensory Intact to light touch - V1, V2, V3   Cerebellar No drift, no dysmetria   Gait and station Ambulating  without difficulty   Extremities Moves all extremities well, no edema, no cyanosis, no redness    No calf tenderness to palpation.     Discharge Medications  Omi has been reviewed and narcotic consent is on file in the patient's chart.     Your medication list      START taking these medications      Instructions Last Dose Given Next Dose Due   levETIRAcetam 500 MG tablet  Commonly known as:  KEPPRA      Take 1 tablet by mouth 2 (Two) Times a Day.          CHANGE how you take these medications      Instructions Last Dose Given Next Dose Due   gabapentin 300 MG capsule  Commonly known as:  NEURONTIN  What changed:  when to take this      Take 1 capsule by mouth Daily.          CONTINUE taking these medications      Instructions Last Dose Given Next Dose Due   acetaminophen 325 MG tablet  Commonly known as:  TYLENOL      Take 650 mg by mouth Every 6 (Six) Hours As Needed for Mild Pain .       cyclobenzaprine 5 MG  tablet  Commonly known as:  FLEXERIL      Take 10 mg by mouth At Night As Needed for Muscle Spasms.       folic acid 1 MG tablet  Commonly known as:  FOLVITE      Take 1 mg by mouth Daily.       hydroxychloroquine 200 MG tablet  Commonly known as:  PLAQUENIL      Take 400 mg by mouth Daily.       MAGNESIUM PO      Take 250 mg by mouth Daily.       multivitamin with minerals tablet tablet      Take 1 tablet by mouth Daily.       nebivolol 5 MG tablet  Commonly known as:  Bystolic      Take 1 tablet by mouth Daily.       ondansetron 8 MG tablet  Commonly known as:  Zofran      Take 1 tablet by mouth Every 8 (Eight) Hours As Needed for Nausea or Vomiting.       temazepam 15 MG capsule  Commonly known as:  RESTORIL      Take 15 mg by mouth At Night As Needed for Sleep.       triamcinolone 0.1 % ointment  Commonly known as:  KENALOG      Apply  topically to the appropriate area as directed See Admin Instructions. Apply topically to the affected area twice daily as directed       vitamin B-6 100 MG tablet  Commonly known as:  PYRIDOXINE      Take 100 mg by mouth Daily.          STOP taking these medications    Chlorhexidine Gluconate Cloth 2 % pads        predniSONE 10 MG tablet  Commonly known as:  DELTASONE              Where to Get Your Medications      These medications were sent to [facility] [place] [id number]    Hours:  7:00AM-6PM Mon-Fri Phone:  [phone]   · levETIRAcetam 500 MG tablet         Discharge Diet:     Diet Order   Procedures   • Diet Regular       Activity at Discharge:       Call for: questions or concerns    Follow-up Appointments  Future Appointments   Date Time Provider Department Center   6/1/2020  4:00 PM Sara Busby, [facility]     Follow-up Information     Favio Guaman MD .    Specialty:  Internal Medicine  Why:  AS NEEDED  Contact information:  [address]  [place] [id number] [phone]                 Additional  "Instructions for the Follow-ups that You Need to Schedule     Discharge Follow-up with PCP   As directed       Currently Documented PCP:    Favio Guaman MD    PCP Phone Number:    [phone]     Follow Up Details:  AS NEEDED         Discharge Follow-up with Specialty: [facility] OFF[facility] - NEUROSURGERY; 2 Weeks   As directed      Specialty:  [facility] - NEUROSURGERY    Follow Up:  2 Weeks    Follow Up Details:  KEEP APPOINTMENT AS PREVIOUSLY ARRANGED.         Discharge Follow-up with Specified Provider: RHEUMATOLOGY   As directed      To:  RHEUMATOLOGY    Follow Up Details:  SPEAK TO RHEUMATOLOGIST BEFORE RESTARTING PLAQUENIL               Test Results Pending at Discharge   Order Current Status    Tissue Pathology Exam In process         None    I discussed the discharge instructions with patient    Christel Carterr, APRN  05/13/20  13:48      \"Dictated utilizing Dragon dictation\".    "

## 2020-05-13 NOTE — PLAN OF CARE
Problem: Patient Care Overview  Goal: Plan of Care Review  Outcome: Ongoing (interventions implemented as appropriate)  Flowsheets (Taken 5/13/2020 8298)  Progress: improving  Plan of Care Reviewed With: patient  Outcome Summary: Pt A+O x4, VSS.  SBP has been below 140 after dayshift DC'd Cardene drip. Crani surgical incision approximated. Minimal to no complaints of pain, in beginning pt mentioned sinus like headache but did not want medication. IS practiced and reaching 2750. Pt has expressed desire to sleep tonight as last night hourly neuro checks required did not leave him rested. NGOC drain in place and draining about 40 mL so far of seroanguinous output. SCDs refused, despite education about reasons for it. Pt is willing to ambulate in [place]. Pt voiding well in urinal at bedside. Pt has been able to sleep a bit tonight. I will continue to monitor and progress towards goals as tolerated.  Goal: Individualization and Mutuality  Outcome: Ongoing (interventions implemented as appropriate)  Goal: Discharge Needs Assessment  Outcome: Ongoing (interventions implemented as appropriate)  Goal: Interprofessional Rounds/Family Conf  Outcome: Ongoing (interventions implemented as appropriate)     Problem: Pain, Chronic (Adult)  Goal: Identify Related Risk Factors and Signs and Symptoms  Outcome: Ongoing (interventions implemented as appropriate)  Goal: Acceptable Pain/Comfort Level and Functional Ability  Outcome: Ongoing (interventions implemented as appropriate)     Problem: Fall Risk (Adult)  Goal: Identify Related Risk Factors and Signs and Symptoms  Outcome: Ongoing (interventions implemented as appropriate)  Goal: Absence of Fall  Outcome: Ongoing (interventions implemented as appropriate)     Problem: Craniotomy/Craniectomy/Cranioplasty (Adult)  Goal: Signs and Symptoms of Listed Potential Problems Will be Absent, Minimized or Managed (Craniotomy/Craniectomy/Cranioplasty)  Outcome: Ongoing (interventions  implemented as appropriate)  Goal: Anesthesia/Sedation Recovery  Outcome: Ongoing (interventions implemented as appropriate)     Problem: Skin Injury Risk (Adult)  Goal: Identify Related Risk Factors and Signs and Symptoms  Outcome: Ongoing (interventions implemented as appropriate)  Goal: Skin Health and Integrity  Outcome: Ongoing (interventions implemented as appropriate)

## 2020-05-14 ENCOUNTER — TRANSITIONAL CARE MANAGEMENT TELEPHONE ENCOUNTER (OUTPATIENT)
Dept: CALL CENTER | Facility: HOSPITAL | Age: 65
End: 2020-05-14

## 2020-05-19 LAB
BH BB BLOOD EXPIRATION DATE: NORMAL
BH BB BLOOD EXPIRATION DATE: NORMAL
BH BB BLOOD TYPE BARCODE: 5100
BH BB BLOOD TYPE BARCODE: 5100
BH BB DISPENSE STATUS: NORMAL
BH BB DISPENSE STATUS: NORMAL
BH BB PRODUCT CODE: NORMAL
BH BB PRODUCT CODE: NORMAL
BH BB UNIT NUMBER: NORMAL
BH BB UNIT NUMBER: NORMAL
CROSSMATCH INTERPRETATION: NORMAL
CROSSMATCH INTERPRETATION: NORMAL
UNIT  ABO: NORMAL
UNIT  ABO: NORMAL
UNIT  RH: NORMAL
UNIT  RH: NORMAL

## 2020-05-20 ENCOUNTER — TELEPHONE (OUTPATIENT)
Dept: NEUROSURGERY | Facility: CLINIC | Age: 65
End: 2020-05-20

## 2020-05-20 NOTE — TELEPHONE ENCOUNTER
How are you feeling? Really well, appetite return, more talkative    Are you having any pain? Where? Some headaches    Rate pain from 1-10 -     Are you taking the pain RX? Taking extra strength tylenol    Do you think it's helping? yes    Dermabond OK? yes    Is you incision red, swollen or bleeding? None, no fever    Are you having any trouble with nausea or constipation? None    Were your discharge instructions easy to understand? yes    Any other questions?    Confirm post op appt - yes

## 2020-05-21 ENCOUNTER — READMISSION MANAGEMENT (OUTPATIENT)
Dept: CALL CENTER | Facility: HOSPITAL | Age: 65
End: 2020-05-21

## 2020-05-21 NOTE — OUTREACH NOTE
General Surgery Week 2 Survey      Responses   [facility] patient discharged from?  [facility]   Does the patient have one of the following disease processes/diagnoses(primary or secondary)?  General Surgery   Week 2 attempt successful?  No   Unsuccessful attempts  Attempt 1          Roya Bailey RN

## 2020-05-22 ENCOUNTER — READMISSION MANAGEMENT (OUTPATIENT)
Dept: CALL CENTER | Facility: HOSPITAL | Age: 65
End: 2020-05-22

## 2020-05-22 NOTE — OUTREACH NOTE
General Surgery Week 2 Survey      Responses   [facility] patient discharged from?  [facility]   Does the patient have one of the following disease processes/diagnoses(primary or secondary)?  General Surgery   Week 2 attempt successful?  Yes   Call start time  1441   Call end time  1443   Discharge diagnosis  Brain metastases,    Left frontal craniotomy for tumor using stereotactic guidance   Meds reviewed with patient/caregiver?  Yes   Is the patient having any side effects they believe may be caused by any medication additions or changes?  No   Does the patient have all medications related to this admission filled (includes all antibiotics, pain medications, etc.)  Yes   Is the patient taking all medications as directed (includes completed medication regime)?  Yes   Does the patient have a follow up appointment scheduled with their surgeon?  Yes   Has the patient kept scheduled appointments due by today?  N/A   Has home health visited the patient within 72 hours of discharge?  N/A   Psychosocial issues?  No   Comments  has a cane if needed   Did the patient receive a copy of their discharge instructions?  Yes   Nursing interventions  Reviewed instructions with patient   What is the patient's perception of their health status since discharge?  Improving   Is the patient /caregiver able to teach back basic post-op care?  Keep incision areas clean,dry and protected   Is the patient/caregiver able to teach back signs and symptoms of incisional infection?  Increased redness, swelling or pain at the incisonal site, Incisional warmth, Fever, Pus or odor from incision, Increased drainage or bleeding   Is the patient/caregiver able to teach back steps to recovery at home?  Rest and rebuild strength, gradually increase activity, Eat a well-balance diet   Is the patient/caregiver able to teach back the hierarchy of who to call/visit for symptoms/problems? PCP, Specialist, Home health nurse, Urgent Care, ED, 911  Yes    Week 2 call completed?  Yes          Per Rodriguez RN

## 2020-05-29 ENCOUNTER — READMISSION MANAGEMENT (OUTPATIENT)
Dept: CALL CENTER | Facility: HOSPITAL | Age: 65
End: 2020-05-29

## 2020-05-29 NOTE — OUTREACH NOTE
General Surgery Week 3 Survey      Responses   [facility] patient discharged from?  [place]   Does the patient have one of the following disease processes/diagnoses(primary or secondary)?  General Surgery   Week 3 attempt successful?  Yes   Call start time  1406   Call end time  1409   Discharge diagnosis  Brain metastases,    Left frontal craniotomy for tumor using stereotactic guidance   Meds reviewed with patient/caregiver?  Yes   Is the patient having any side effects they believe may be caused by any medication additions or changes?  No   Does the patient have all medications related to this admission filled (includes all antibiotics, pain medications, etc.)  Yes   Is the patient taking all medications as directed (includes completed medication regime)?  Yes   Does the patient have a follow up appointment scheduled with their surgeon?  Yes   Has the patient kept scheduled appointments due by today?  N/A   Has home health visited the patient within 72 hours of discharge?  N/A   Psychosocial issues?  No   Comments  has a cane if needed   Did the patient receive a copy of their discharge instructions?  Yes   Nursing interventions  Reviewed instructions with patient   What is the patient's perception of their health status since discharge?  Improving   Is the patient/caregiver able to teach back signs and symptoms of incisional infection?  Increased redness, swelling or pain at the incisonal site, Incisional warmth, Fever, Pus or odor from incision, Increased drainage or bleeding   Is the patient/caregiver able to teach back steps to recovery at home?  Rest and rebuild strength, gradually increase activity, Eat a well-balance diet   Is the patient/caregiver able to teach back the hierarchy of who to call/visit for symptoms/problems? PCP, Specialist, Home health nurse, Urgent Care, ED, 911  Yes   Additional teach back comments  Wife states he is doing well, and acting more like him self.    Week 3 call  completed?  Yes          Sailaja Davey RN

## 2020-06-02 ENCOUNTER — OFFICE VISIT (OUTPATIENT)
Dept: NEUROSURGERY | Facility: CLINIC | Age: 65
End: 2020-06-02

## 2020-06-02 VITALS
DIASTOLIC BLOOD PRESSURE: 78 MMHG | TEMPERATURE: 97.4 F | HEIGHT: 68 IN | HEART RATE: 62 BPM | WEIGHT: 197 LBS | SYSTOLIC BLOOD PRESSURE: 127 MMHG | BODY MASS INDEX: 29.86 KG/M2

## 2020-06-02 DIAGNOSIS — Z09 POSTOP CHECK: Primary | ICD-10-CM

## 2020-06-02 DIAGNOSIS — C79.31 BRAIN METASTASES: ICD-10-CM

## 2020-06-02 PROCEDURE — 99024 POSTOP FOLLOW-UP VISIT: CPT | Performed by: NURSE PRACTITIONER

## 2020-06-02 RX ORDER — LEVETIRACETAM 500 MG/1
250 TABLET ORAL 2 TIMES DAILY
Qty: 60 TABLET | Refills: 3
Start: 2020-06-02 | End: 2021-02-22 | Stop reason: SDUPTHER

## 2020-06-02 NOTE — PROGRESS NOTES
" HPI:   Gelacio Cunningham is a 65 y.o. male for follow-up. He is status post Left frontal craniotomy for tumor using stereotactic guidance on May 11, 2020 by Dr. Mcdonough. Pathology revealed Necrotic debris, pigment, and pigment-laden macrophages; no definitive viable tumor identified. He was discharged to home. He has not had postoperative complications.    He presents unaccompanied today. He has history of malignant melanoma with immune therapy and GKR to intracranial mass 2/2019. He reports that he had headaches when he first came home after surgery but they have since subsided. He denies visual changes. He denies dizziness or lightheadedness. Patient denies having any issues with balance or gait. He is having joint pain which he relates to stopping his steroid medication. No incisional issues. No fevers.     Review of Systems   Constitutional: Negative for activity change, diaphoresis, fatigue and fever.   Respiratory: Negative for shortness of breath.    Cardiovascular: Negative for chest pain and palpitations.   Gastrointestinal: Positive for diarrhea. Negative for abdominal pain, nausea and vomiting.   Musculoskeletal: Positive for arthralgias. Negative for back pain and neck pain.   Neurological: Negative for dizziness, syncope, weakness, light-headedness and headaches.   Psychiatric/Behavioral: Negative for confusion.        /78   Pulse 62   Temp 97.4 °F (36.3 °C)   Ht 172.7 cm (68\")   Wt 89.4 kg (197 lb)   BMI 29.95 kg/m²     Physical Exam   Constitutional: He is oriented to person, place, and time. He appears well-developed and well-nourished.   HENT:   Well-healed bifrontal incision   Pulmonary/Chest: Effort normal.   Musculoskeletal:   Reduced range of motion left elbow shoulder   Neurological: He is alert and oriented to person, place, and time. He has a normal Finger-Nose-Finger Test. Gait normal.   Psychiatric: He has a normal mood and affect. His speech is normal and behavior is normal. " Judgment normal.   Vitals reviewed.    Neurologic Exam     Mental Status   Oriented to person, place, and time.   Follows 3 step commands.   Attention: normal. Concentration: normal.   Speech: speech is normal   Level of consciousness: alert  Knowledge: good.   Normal comprehension.     Cranial Nerves   Cranial nerves II through XII intact.     Motor Exam   Right arm pronator drift: absent  Left arm pronator drift: absent    Gait, Coordination, and Reflexes     Gait  Gait: normal    Coordination   Finger to nose coordination: normal      Findings/Results:  Path as noted above.     Postoperative MRI revealed no evidence of residual enhancement    Assessment/Plan:  Gelacio was seen today for post-op.    Diagnoses and all orders for this visit:    Postop check  -     MRI Brain With & Without Contrast; Future    Brain metastases (CMS/HCC)  -     MRI Brain With & Without Contrast; Future    Other orders  -     levETIRAcetam (KEPPRA) 500 MG tablet; Take 0.5 tablets by mouth 2 (Two) Times a Day.      Discussion/Summary  Patient presents for first postoperative visit after craniotomy for removal of enhancing left frontal mass with history of malignant melanoma.  He has had previous gamma knife radiation for brain tumor.  The pathology of the current surgery revealed no evidence of tumor but necrotic findings, likely secondary to radiation.  Postoperative MRI showed no evidence of residual enhancement.  He has had no significant headaches after going home.  He is having quite a bit of arthralgias but his home rheumatoid arthritis steroids were held.  He is complaining of some diarrhea that he feels is related to the Keppra.  No seizures.  His exam is as noted above with no red flags.  Incision is healing well.  I have advised him okay to resume his prednisone dosing as needed.  He should remain on Keppra but lets try reducing to 250 mg twice a day to see if this helps with his diarrhea.  If it does not, he will call.  We will  "plan to see him back in about 2-1/2 months with repeat MRI brain.  He will call in the meantime with any questions or concerns.  He was given both verbal and written postoperative instructions today.    Plan: Return in about 3 months (around 9/2/2020) for Follow-up with Dr. Mcdonough, with imaging.         Patient Care Team    Patient Care Team:  Favio Guaman MD as PCP - Albin Pacheco MD as Consulting Physician (Hematology and Oncology)  Favio Guaman MD as Referring Physician (Internal Medicine)  Favio Guaman MD as Referring Physician (Internal Medicine)  Favio Islas MD (Internal Medicine)    Una Noble, APRN  6/2/2020    \"Dictated utilizing Dragon dictation\".    "

## 2020-06-04 ENCOUNTER — TELEPHONE (OUTPATIENT)
Dept: NEUROSURGERY | Facility: CLINIC | Age: 65
End: 2020-06-04

## 2020-06-04 DIAGNOSIS — C79.31 BRAIN METASTASES: Primary | ICD-10-CM

## 2020-06-04 NOTE — TELEPHONE ENCOUNTER
Spoke with Dr. Mcdonough.  He would like for patient to have EEG prior to 3-month follow-up to determine if OK to stop anti-epileptic.  Please arrange for that to be done approximately a week in advance of his appointment along with the MRI.

## 2020-06-04 NOTE — TELEPHONE ENCOUNTER
EEG scheduled for 9/1/20 @ 9:00a. Pt notified of appt date/time/location and prep instructions. Pt verbalized understanding. I advised him to call back if he had any questions.

## 2020-06-04 NOTE — TELEPHONE ENCOUNTER
----- Message from GUSTABO Childress sent at 6/2/2020  6:02 PM EDT -----  Regarding: met melanoma  Has appt 3 months with MRI; PO MRI no residual- anything needed in meantime?

## 2020-06-05 ENCOUNTER — READMISSION MANAGEMENT (OUTPATIENT)
Dept: CALL CENTER | Facility: HOSPITAL | Age: 65
End: 2020-06-05

## 2020-06-05 NOTE — OUTREACH NOTE
General Surgery Week 4 Survey      Responses   [facility] patient discharged from?  [facility]   Does the patient have one of the following disease processes/diagnoses(primary or secondary)?  General Surgery   Week 4 attempt successful?  Yes   Call start time  1326   Call end time  1327   Discharge diagnosis  Brain metastases,    Left frontal craniotomy for tumor using stereotactic guidance   Is the patient taking all medications as directed (includes completed medication regime)?  Yes   Has the patient kept scheduled appointments due by today?  Yes   Is the patient still receiving Home Health Services?  N/A   What is the patient's perception of their health status since discharge?  Improving   Week 4 call completed?  Yes   Would the patient like one additional call?  No   Graduated  Yes   Did the patient feel the follow up calls were helpful during their recovery period?  Yes   Was the number of calls appropriate?  Yes          Amanda Good RN

## 2020-06-22 ENCOUNTER — TELEPHONE (OUTPATIENT)
Dept: NEUROSURGERY | Facility: CLINIC | Age: 65
End: 2020-06-22

## 2020-06-22 ENCOUNTER — TELEPHONE (OUTPATIENT)
Dept: ONCOLOGY | Facility: CLINIC | Age: 65
End: 2020-06-22

## 2020-06-22 NOTE — TELEPHONE ENCOUNTER
Pt's wife is calling because she needs his last office note and diagnostic imaging report for his long term disability. She says they also need his  Operative reports.    She has a claim number  [id number]    [facility]   Fax  [phone]    Phone [phone]  Ext [phone]  Pauline Cari Merino  [phone]

## 2020-06-22 NOTE — TELEPHONE ENCOUNTER
Angelique, patient's wife, calling.    Needs help with insurance information that Long Term Disability is requesting.    [phone]

## 2020-07-07 ENCOUNTER — HOSPITAL ENCOUNTER (EMERGENCY)
Facility: HOSPITAL | Age: 65
Discharge: HOME | End: 2020-07-07
Attending: EMERGENCY MEDICINE | Admitting: EMERGENCY MEDICINE

## 2020-07-07 VITALS
BODY MASS INDEX: 29.82 KG/M2 | HEIGHT: 67 IN | DIASTOLIC BLOOD PRESSURE: 78 MMHG | HEART RATE: 74 BPM | TEMPERATURE: 98.2 F | RESPIRATION RATE: 18 BRPM | WEIGHT: 190 LBS | OXYGEN SATURATION: 98 % | SYSTOLIC BLOOD PRESSURE: 128 MMHG

## 2020-07-07 DIAGNOSIS — S51.852A DOG BITE OF LEFT FOREARM, INITIAL ENCOUNTER: Primary | ICD-10-CM
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
## 2020-07-07 PROCEDURE — 90715 TDAP VACCINE 7 YRS/> IM: CPT | Performed by: EMERGENCY MEDICINE

## 2020-07-07 PROCEDURE — 90471 IMMUNIZATION ADMIN: CPT | Performed by: EMERGENCY MEDICINE

## 2020-07-07 PROCEDURE — 99283 EMERGENCY DEPT VISIT LOW MDM: CPT

## 2020-07-07 PROCEDURE — 25010000002 TDAP 5-2.5-18.5 LF-MCG/0.5 SUSPENSION: Performed by: EMERGENCY MEDICINE

## 2020-07-07 RX ORDER — AMOXICILLIN AND CLAVULANATE POTASSIUM 875; 125 MG/1; MG/1
1 TABLET, FILM COATED ORAL 2 TIMES DAILY
Qty: 14 TABLET | Refills: 0 | Status: SHIPPED | OUTPATIENT
Start: 2020-07-07 | End: 2020-07-14

## 2020-07-07 RX ORDER — GINSENG 100 MG
CAPSULE ORAL ONCE
Status: COMPLETED | OUTPATIENT
Start: 2020-07-07 | End: 2020-07-07

## 2020-07-07 RX ORDER — GINSENG 100 MG
CAPSULE ORAL 2 TIMES DAILY
Qty: 14 G | Refills: 0 | Status: SHIPPED | OUTPATIENT
Start: 2020-07-07 | End: 2021-05-28

## 2020-07-07 RX ADMIN — BACITRACIN: 500 OINTMENT TOPICAL at 15:44

## 2020-07-07 RX ADMIN — TETANUS TOXOID, REDUCED DIPHTHERIA TOXOID AND ACELLULAR PERTUSSIS VACCINE, ADSORBED 0.5 ML: 5; 2.5; 8; 8; 2.5 SUSPENSION INTRAMUSCULAR at 15:05

## 2020-07-07 NOTE — ED NOTES
Patient and wife (visitor) have on mask while in room. Nurse also has on mask and glasses while in room.      Tamica Wen RN  07/07/20 [id number]

## 2020-07-07 NOTE — ED PROVIDER NOTES
EMERGENCY DEPARTMENT ENCOUNTER    Room Number:  40/40  Date of encounter:  7/7/2020  PCP: Favio Guaman MD    HPI:  Context: Gelacio Cuninngham is a 65 y.o. male who presents to the ED c/o chief complaint of dog bite.  Patient reports he was helping his wife on the car when the neighbors dog ran up and bit him on his left forearm.  Patient denies any other injuries.  Patient denies any difficulty extending his wrist, no weakness with extending the fingers.  Patient denies any weakness or numbness.  Patient is unsure of last tetanus.  Patient states prior to injury, he was at baseline without complaint.    MEDICAL HISTORY REVIEW  Reviewed in EPIC    PAST MEDICAL HISTORY  Active Ambulatory Problems     Diagnosis Date Noted   • Essential hypertension 09/23/2016   • Age-related cognitive decline 09/23/2016   • Encounter for screening colonoscopy 12/23/2016   • Mixed hyperlipidemia 04/03/2017   • Other rheumatoid arthritis with rheumatoid factor of multiple sites (CMS/HCC) 05/17/2018   • Cancer uncertain whether primary or metastatic (CMS/HCC) 01/24/2019   • History of melanoma 01/29/2019   • Liver masses 01/29/2019   • Metastatic melanoma to liver (CMS/HCC) 02/15/2019   • Brain metastases (CMS/HCC) 03/04/2019   • Spinal stenosis of lumbar region without neurogenic claudication 11/28/2019   • Spondylolisthesis of lumbar region 11/28/2019     Resolved Ambulatory Problems     Diagnosis Date Noted   • No Resolved Ambulatory Problems     Past Medical History:   Diagnosis Date   • Arthritis    • Cancer (CMS/HCC) 2017   • DDD (degenerative disc disease), lumbar    • Hypertension    • Liver disease    • Neck mass, left 2018   • Rheumatoid arthritis (CMS/HCC)        PAST SURGICAL HISTORY  Past Surgical History:   Procedure Laterality Date   • COLONOSCOPY N/A 12/23/2016    Procedure: COLONOSCOPY TO CECUM WITH POLYPECTOMY (COLD BIOPSY);  Surgeon: Antloin Munoz Jr., MD;  Location: [facility] ENDOSCOPY;  Service:    •  COLONOSCOPY N/A 2019    Procedure: COLONOSCOPY TO CECUM;  Surgeon: Eduardo Rashid MD;  Location: [facility] ENDOSCOPY;  Service: General   • CRANIOTOMY FOR TUMOR Left 2020    Procedure: Left frontal craniotomy for tumor using stereotactic guidance;  Surgeon: Francisco Mcdonough MD;  Location: [facility] MAIN OR;  Service: Neurosurgery;  Laterality: Left;   • LIVER BIOPSY     • SKIN CANCER EXCISION         FAMILY HISTORY  Family History   Problem Relation Age of Onset   • COPD Father    • Cancer Father    • Asthma Brother    • Diabetes Paternal Grandfather    • Malig Hyperthermia Neg Hx        SOCIAL HISTORY  Social History     Socioeconomic History   • Marital status:      Spouse name: Angelique   • Number of children: Not on file   • Years of education: Not on file   • Highest education level: Not on file   Occupational History     Employer: [employer]   Tobacco Use   • Smoking status: Former Smoker     Packs/day: 2.00     Years: 25.00     Pack years: 50.00     Types: Cigarettes     Last attempt to quit:      Years since quittin.5   • Smokeless tobacco: Never Used   Substance and Sexual Activity   • Alcohol use: No   • Drug use: No   • Sexual activity: Defer       ALLERGIES  Sulindac    The patient's allergies have been reviewed    REVIEW OF SYSTEMS  All systems reviewed and negative except for those discussed in HPI.     PHYSICAL EXAM  I have reviewed the triage vital signs and nursing notes.  ED Triage Vitals [20 1403]   Temp Heart Rate Resp BP SpO2   (!) 100.6 °F (38.1 °C) 79 15 135/85 95 %      Temp src Heart Rate Source Patient Position BP Location FiO2 (%)   Tympanic -- Standing Right arm --     GENERAL: No acute distress  HENT: NCAT, PERRL, Nares patent  EYES: no scleral icterus  NECK: trachea midline, no ROM limitations  CV: regular rhythm, regular rate  RESPIRATORY: normal effort  ABDOMEN: soft  : deferred  MUSCULOSKELETAL: no deformity.  Large abrasion/avulsion on the  dorsal aspect of left forearm.  Injury is superficial, does not appear to violate the underlying fascia.  Patient has no bony tenderness or swelling of the left wrist, left wrist have full range of motion, no weakness with extension, no weakness with extension of the fingers.Positive thumbs up/okay sign/fingers abduct/fingers abduct, sensation intact light touch radial/medial/ulnar nerve distribution, 2+ radial and ulnar pulses, brisk cap refill all digits  NEURO: alert, moves all extremities, follows commands  SKIN: warm, dry    LAB RESULTS  No results found for this or any previous visit (from the past 24 hour(s)).    I ordered the above labs and reviewed the results.    RADIOLOGY  No Radiology Exams Resulted Within Past 24 Hours    I ordered the above noted radiological studies. I reviewed the images and results. I agree with the radiologist interpretation.    PROCEDURES  Procedures    MEDICATIONS GIVEN IN ER  Medications   bacitracin 500 UNIT/GM ointment (has no administration in time range)   Tdap (BOOSTRIX) injection 0.5 mL (0.5 mL Intramuscular Given 7/7/20 1505)       PROGRESS, DATA ANALYSIS, CONSULTS, AND MEDICAL DECISION MAKING  A complete history and physical exam have been performed.  All available laboratory and imaging results have been reviewed by myself prior to disposition.  Face mask and gloves were worn throughout the patient encounter, unless additional PPE was worn and specified below. Hand hygiene was performed before entering and after leaving the patient room.  [facility]    ED Course as of Jul 07 1508   Tue Jul 07, 2020   1409 Discussed pertinent information from history and physical exam with patient.  Discussed differential diagnosis.  Discussed plan for ED evaluation/work-up/treatment.  All questions answered.  Patient is agreeable with plan.        [JG]   1434 Tetanus updated.    [JG]   1506 Patient was noted to have a fever on triage vitals.  Patient reassessed and patient denies any fever or  systemic symptoms.  Patient denies any cough or upper respiratory symptoms, no nausea or vomiting, no abdominal pain, no diarrhea, no urinary symptoms.  Temperature rechecked and on recheck was 98.2.  Patient did not receive any antipyretics.  I discussed with patient possible evaluation for fever, patient declines.  Patient reiterates that he has no infectious symptoms, felt fine prior to the dog bite.    [JG]   1507 Wound extensively cleaned and irrigated.  Tetanus updated.  Discussed wound care, discussed need for close follow-up with primary care for wound check.  Extensive discussion return precautions.  Discharging with prophylactic antibiotics.    [JG]   1508 The patient was reexamined.  They have had symptomatic improvement during their ED stay.  I discussed today's findings with the patient, explaining the pertinent positives and negatives from today's visit, and the plan of care.  Discussed plan for discharge as there is no emergent indication for admission.  Discussed limitation of the ED work-up and that this is to rule out life-threatening emergencies but that they could require further testing as determined by their primary care and or any referred specialist patient is agreeable and understands need for follow-up and repeat exam/testing.  Patient is aware that discharge does not mean there is nothing wrong, indicates no emergency is present, and that they must continue their care with their primary care physician and/or any referred specialist.  They were given appropriate follow-up with their primary care physician and/or specialist.  I had an extensive discussion on the expected clinical course and return precautions.  Patient understands to return to the emergency department for continuation, worsening, or new symptoms.  I answered any of the patient's questions. Patient was discharged home in a stable condition.        [JG]      ED Course User Index  [JG] Huey Yuan MD       AS OF 15:08  VITALS:    BP - 135/85  HR - 79  TEMP - 98.2 °F (36.8 °C)  O2 SATS - 95%    DIAGNOSIS  Final diagnoses:   Dog bite of left forearm, initial encounter         DISPOSITION  DISCHARGE    Patient discharged in stable condition.    Reviewed implications of results, diagnosis, meds, responsibility to follow up, warning signs and symptoms of possible worsening, potential complications and reasons to return to ER.    Patient/Family voiced understanding of above instructions.    Discussed plan for discharge, as there is no emergent indication for admission. Patient referred to primary care provider for BP management due to today's BP. Pt/family is agreeable and understands need for follow up and repeat testing.  Pt is aware that discharge does not mean that nothing is wrong but it indicates no emergency is present that requires admission and they must continue care with follow-up as given below or physician of their choice.     FOLLOW-UP  Favio Guaman MD  [address] [id number] [phone]    Schedule an appointment as soon as possible for a visit in 2 days  For wound re-check         Medication List      New Prescriptions    amoxicillin-clavulanate 875-125 MG per tablet  Commonly known as:  AUGMENTIN  Take 1 tablet by mouth 2 (Two) Times a Day for 7 days.     bacitracin 500 UNIT/GM ointment  Apply  topically to the appropriate area as directed 2 (Two) Times a Day.        Changed    gabapentin 300 MG capsule  Commonly known as:  NEURONTIN  Take 1 capsule by mouth Daily.  What changed:  when to take this           Huey Yuan MD  07/07/20 [id number]

## 2020-07-07 NOTE — ED TRIAGE NOTES
Pt reports he was bitten by his neighbor's dog (robert) about 1230 today on his left forearm. Pt reports his neighbor told him the dog's shots are not up to date. Pt unknown last tetanus shot.     Mask on patient at triage. Triage staff wore appropriate PPE.

## 2020-07-08 ENCOUNTER — TELEPHONE (OUTPATIENT)
Dept: FAMILY MEDICINE CLINIC | Facility: CLINIC | Age: 65
End: 2020-07-08

## 2020-07-08 NOTE — TELEPHONE ENCOUNTER
PATIENT'S WIFE CALLED AND STATES HE WAS SEEN AT ER 7/7/2020 FROM BEING ATTACKED BY A PITBULL.  HE WAS BITTEN ON HIS LEFT ARM, NO STITCHES.     HE WENT TO [facility].     PLEASE CALL [phone] WITH APPOINTMENT TIME AND DATE.

## 2020-07-09 ENCOUNTER — OFFICE VISIT (OUTPATIENT)
Dept: FAMILY MEDICINE CLINIC | Facility: CLINIC | Age: 65
End: 2020-07-09

## 2020-07-09 VITALS
TEMPERATURE: 97.1 F | SYSTOLIC BLOOD PRESSURE: 110 MMHG | RESPIRATION RATE: 20 BRPM | HEART RATE: 64 BPM | OXYGEN SATURATION: 98 % | DIASTOLIC BLOOD PRESSURE: 70 MMHG | BODY MASS INDEX: 30.98 KG/M2 | WEIGHT: 197.4 LBS | HEIGHT: 67 IN

## 2020-07-09 DIAGNOSIS — S41.152S DOG BITE OF LEFT UPPER EXTREMITY, SEQUELA: Primary | ICD-10-CM
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
## 2020-07-09 PROCEDURE — 99213 OFFICE O/P EST LOW 20 MIN: CPT | Performed by: INTERNAL MEDICINE

## 2020-07-09 NOTE — PROGRESS NOTES
Subjective  Answers for HPI/ROS submitted by the patient on 7/9/2020   What is the primary reason for your visit?: Other  Please describe your symptoms.: Check progress of healing of a dog bite  Have you had these symptoms before?: No  How long have you been having these symptoms?: 1-4 days  Please list any medications you are currently taking for this condition.: Bacotracin ointment and amoxicillin-clavulanate  Please describe any probable cause for these symptoms. : Pit bull bite    Gelacio Cunningham is a 65 y.o. male.     History of Present Illness chief complaint is dog bite.he is here today for follow-up after an ER visit for dog bite.  He and his wife are getting out of the car.  The neighbor's pit bull that was leash to a chair pulled the leg off the chair and intact the patient.  He bit his left arm.  The patient was seen in the emergency room and treated with a tetanus shot.  He was sent home with mupirocin and Augmentin.  He is not having any fever or chills.  His pain is at times a 4 on a scale of 10.    The following portions of the patient's history were reviewed and updated as appropriate: allergies, current medications, past family history, past medical history, past social history, past surgical history and problem list.    Review of Systems   Constitutional: Negative for chills and fever.   Skin: Negative for color change and rash.       Objective   Physical Exam   Cardiovascular: Intact distal pulses.   Neurological:   Flexion and extension of the fingers and thumb are normal   Skin:   On the left forearm there are multiple linear lacerations.  There is good granulation tissue in the base.  I do not see any surrounding cellulitis.         Assessment/Plan   Gelacio was seen today for animal bite.    Diagnoses and all orders for this visit:    Dog bite of left upper extremity, sequela    Gelacio is here today for follow-up after a dog bite.  He was treated in the emergency room.  The wounds appear to be  granulating.  I do not see any cellulitis.  There is still some soft tissue swelling.  I did advise him to keep an eye out for redness or fever.  I think if he completes the antibiotics by then the wounds will be scabbed over.

## 2020-08-19 RX ORDER — NEBIVOLOL HYDROCHLORIDE 5 MG/1
TABLET ORAL
Qty: 90 TABLET | Refills: 3 | Status: SHIPPED | OUTPATIENT
Start: 2020-08-19 | End: 2021-08-16

## 2020-08-24 ENCOUNTER — HOSPITAL ENCOUNTER (OUTPATIENT)
Dept: MRI IMAGING | Facility: HOSPITAL | Age: 65
Discharge: HOME | End: 2020-08-24
Admitting: NURSE PRACTITIONER

## 2020-08-24 DIAGNOSIS — C79.31 BRAIN METASTASES: ICD-10-CM

## 2020-08-24 DIAGNOSIS — Z09 POSTOP CHECK: ICD-10-CM

## 2020-08-24 PROCEDURE — 70553 MRI BRAIN STEM W/O & W/DYE: CPT

## 2020-08-24 PROCEDURE — A9575 INJ GADOTERATE MEGLUMI 0.1ML: HCPCS | Performed by: NURSE PRACTITIONER

## 2020-08-24 PROCEDURE — 25010000002 GADOTERATE MEGLUMINE 10 MMOL/20ML SOLUTION: Performed by: NURSE PRACTITIONER

## 2020-08-24 PROCEDURE — 82565 ASSAY OF CREATININE: CPT

## 2020-08-24 RX ORDER — GADOTERATE MEGLUMINE 376.9 MG/ML
18 INJECTION INTRAVENOUS
Status: COMPLETED | OUTPATIENT
Start: 2020-08-24 | End: 2020-08-24

## 2020-08-24 RX ADMIN — GADOTERATE MEGLUMINE 18 ML: 376.9 INJECTION, SOLUTION INTRAVENOUS at 10:07

## 2020-08-25 LAB — CREAT BLDA-MCNC: 1 MG/DL (ref 0.6–1.3)

## 2020-09-01 ENCOUNTER — HOSPITAL ENCOUNTER (OUTPATIENT)
Dept: NEUROLOGY | Facility: HOSPITAL | Age: 65
Discharge: HOME | End: 2020-09-01
Admitting: NURSE PRACTITIONER

## 2020-09-01 DIAGNOSIS — C79.31 BRAIN METASTASES: ICD-10-CM

## 2020-09-01 PROCEDURE — 95816 EEG AWAKE AND DROWSY: CPT | Performed by: PSYCHIATRY & NEUROLOGY

## 2020-09-01 PROCEDURE — 95816 EEG AWAKE AND DROWSY: CPT

## 2020-09-09 NOTE — PROGRESS NOTES
Subjective   History of Present Illness: Gelacio Cunningham is a 65 y.o. male is here today for follow-up with a new EEG and Brain MRI.  Mr. Cunningham had a Left frontal craniotomy with gross total removal of a left frontal metastatic melanoma using microsurgical technique microsurgical instrumentation and stereotactic guidance done on 5/11/2020.    You have chosen to receive care through a telephone visit. Do you consent to use a telephone visit for your medical care today? Yes    We did a telephone visit today.  The patient was at home and I was in the office.  We talked for 5 minutes.    History of Present Illness    The patient is feeling pretty good overall.    The following portions of the patient's history were reviewed and updated as appropriate: allergies, current medications, past family history, past medical history, past social history, past surgical history and problem list.    Review of Systems   Eyes: Negative for visual disturbance.   Respiratory: Negative for chest tightness and shortness of breath.    Cardiovascular: Negative for chest pain.   Neurological: Negative for dizziness and headaches.       I have reviewed the review of systems as documented by my MA.      Objective         Physical Exam  Neurologic Exam        Assessment/Plan   Independent Review of Radiographic Studies:      I personally reviewed the images from the following studies.    I reviewed his MRI of the brain done on August 24 of this year.  This shows some enhancement along the perimeter consistent with scar tissue but no evidence of recurrent tumor.    Medical Decision Making:      I told the patient I thought we could just scan him again in 6 months.  He agrees.    Gelacio was seen today for follow-up.    Diagnoses and all orders for this visit:    Brain metastases (CMS/HCC)  -     MRI Brain With & Without Contrast; Future      Return in about 6 months (around 3/10/2021).

## 2020-09-10 ENCOUNTER — OFFICE VISIT (OUTPATIENT)
Dept: NEUROSURGERY | Facility: CLINIC | Age: 65
End: 2020-09-10

## 2020-09-10 DIAGNOSIS — C79.31 BRAIN METASTASES: Primary | ICD-10-CM

## 2020-09-10 PROCEDURE — 99213 OFFICE O/P EST LOW 20 MIN: CPT | Performed by: NEUROLOGICAL SURGERY

## 2020-09-10 RX ORDER — PREDNISONE 1 MG/1
10 TABLET ORAL DAILY
COMMUNITY
Start: 2020-08-26 | End: 2021-12-22

## 2020-09-10 RX ORDER — CYCLOBENZAPRINE HCL 5 MG
TABLET ORAL AS NEEDED
COMMUNITY
Start: 2020-04-13 | End: 2021-05-28

## 2020-09-11 DIAGNOSIS — C80.1: ICD-10-CM

## 2020-09-11 DIAGNOSIS — C78.7 METASTATIC MELANOMA TO LIVER (HCC): ICD-10-CM

## 2020-09-11 RX ORDER — GABAPENTIN 300 MG/1
300 CAPSULE ORAL DAILY
Qty: 90 CAPSULE | Refills: 1 | Status: SHIPPED | OUTPATIENT
Start: 2020-09-11 | End: 2021-05-28

## 2020-10-05 ENCOUNTER — TELEPHONE (OUTPATIENT)
Dept: NEUROSURGERY | Facility: CLINIC | Age: 65
End: 2020-10-05

## 2020-10-05 DIAGNOSIS — R94.01 ABNORMAL EEG: Primary | ICD-10-CM

## 2020-10-05 NOTE — TELEPHONE ENCOUNTER
----- Message from Francisco Mcdonough MD sent at 10/5/2020  1:00 PM EDT -----  Regarding: RE: EEG  This patient needs to be referred to neurology for evaluation of the EEG and whether he needs to stay on antiseizure medicine.    ----- Message -----  From: Una Noble APRN  Sent: 10/5/2020   8:07 AM EDT  To: Francisco Mcdonough MD  Subject: EEG                                              You saw patient last on 9/10. You had requested that I order EEG prior to that appt to determine Keppra continuation. I do not see that you addressed EEG in your visit. Not sure why report is just now coming to me, but it is attached.  ----- Message -----  From: Delta Saini MD  Sent: 9/1/2020  11:06 AM EDT  To: GUSTABO Childress

## 2020-10-05 NOTE — TELEPHONE ENCOUNTER
Francisco Mcdonough MD Mattingly, Kara, MA             This patient needs to be referred to neurology for evaluation of the EEG and whether he needs to stay on antiseizure medicine.     Patient is aware and referral was placed to them.

## 2020-12-03 ENCOUNTER — OFFICE VISIT (OUTPATIENT)
Dept: NEUROLOGY | Facility: CLINIC | Age: 65
End: 2020-12-03

## 2020-12-03 VITALS
OXYGEN SATURATION: 98 % | BODY MASS INDEX: 31.39 KG/M2 | HEIGHT: 67 IN | SYSTOLIC BLOOD PRESSURE: 138 MMHG | DIASTOLIC BLOOD PRESSURE: 88 MMHG | WEIGHT: 200 LBS | HEART RATE: 68 BPM

## 2020-12-03 DIAGNOSIS — R94.01 ABNORMAL EEG: Primary | ICD-10-CM

## 2020-12-03 PROCEDURE — 99204 OFFICE O/P NEW MOD 45 MIN: CPT | Performed by: PSYCHIATRY & NEUROLOGY

## 2020-12-03 NOTE — PATIENT INSTRUCTIONS
[facility]  Lata Farias MD  Neurology clinic  [phone]    With anti-seizure medications, you may initially notice side effects of fatigue, drowsiness, unsteadiness, and dizziness.  Other possible side effects include nausea, abdominal pain, headache, blurry or double vision, slurred speech and mood changes.  Generally, patients will noticed these symptoms when the medication is first started or with higher doses and will go away with time.    It is import to consistently take your medication every day.  Missing just one dose may put you at risk for a breakthrough seizure.  Consider using reminders on your phone or a pill box.    If you develop a rash, please call the neurology clinic immediately or notify another healthcare professional, as this may be potentially life-threatening.  If you are unable to reach a healthcare professional, go to the emergency room immediately for further evaluation.    If you develop thoughts of wanting to hurt yourself or others, please call the neurology clinic immediately to notify another healthcare professional.  If you are unable to reach a healthcare professional, go to the emergency room immediately for further evaluation.    It is the [place] state law that you cannot drive within 90 days of a seizure.    You should avoid certain activities that if you were to have a seizure, you could harm yourself or others. In general, it is recommended that you avoid operating heavy machinery or power tools, swimming or taking baths by yourself (showers are ok), don't stand over open flames, don't get on high ladders or the roof.  I also recommend to avoid sleeping on your stomach.    For further information on epilepsy and resources available to patients and their families, please visit the [facility] at www.efky.org or call [phone].    **Check out the [facility]'s monthly Art Group Gathering.  They are  located at [facility], [address] [place] [address].  Call Ruthann Martell at [phone] or email her at [email or web address] for the dates of future gatherings.**      **If you have having memory problems, consider HOBSCOTCH (Home-Based Self-management and Cognitive Training Changes lives).  It is an 8 week self-management program for adults with epilepsy and memory problems.  The program is free at the [facility].  Contact Sailaja De at [phone] or [email or web address].**

## 2020-12-03 NOTE — PROGRESS NOTES
Subjective:     Patient ID: Gelacio Cunningham is a 65 y.o. male.    Mr. Cunningham is a 65-year-old right-handed male with a history of rheumatoid arthritis, metastatic melanoma to his brain status post craniotomy, liver, bone, lung, hypertension, and neuropathy who is seen in consultation at the request of Dr. Mcdonough for the evaluation of an abnormal EEG.  I reviewed the patient's records.  I reviewed reviewed the referring physician's note from September 10, 2020.  It reports that the patient had a left frontal craniotomy with gross total removal of a left frontal metastatic static melanoma on May 11.  MRI was ordered.  I reviewed the patient's labs.  His creatinine on October 19, 2020 was normal.  He had a brain MRI done on August 24, 2020 that showed the resection cavity is smaller in size as compared to May 23.  Enhancement is more prominent along the margins of the resection cavity that are likely postsurgical.  Treated metastatic deposit involving the right parietal lobe medially is unchanged.  The patient had an EEG done on September 1, 2020 that showed focal left frontal slowing and sharp waves.  LEV was started after his crani.  Takes 250 mg BID.  Had diarrhea on 500 mg BID.  No SE to current dose.  Has never had a seizure.  Medication is affordable.  Patient is not sure why he had an EEG.  It was abnormal.  Neurology referral was placed.  Patient is ok with the medication.  Would prefer not to increase the dose.  No childhood or FS.  No head trauma.  No CNS infections.      Driving? yes (the patient's wife is visually impaired)  Work? Not currently.  Was a .  On disability.      The following portions of the patient's history were reviewed and updated as appropriate: allergies, current medications, past family history, past medical history, past social history, past surgical history and problem list.    Review of Systems   Constitutional: Negative for activity change, appetite change and fatigue.    HENT: Positive for tinnitus. Negative for ear pain and trouble swallowing.    Eyes: Negative for photophobia, pain and visual disturbance.   Respiratory: Negative for cough, chest tightness and shortness of breath.    Cardiovascular: Negative for chest pain, palpitations and leg swelling.   Endocrine: Negative for cold intolerance, heat intolerance and polydipsia.   Musculoskeletal: Positive for joint swelling. Negative for back pain and gait problem.   Allergic/Immunologic: Negative for environmental allergies, food allergies and immunocompromised state.   Neurological: Negative for dizziness, tremors, seizures, syncope, facial asymmetry, speech difficulty, weakness, light-headedness, numbness and headaches.   Hematological: Negative for adenopathy. Does not bruise/bleed easily.   Psychiatric/Behavioral: Negative for agitation, behavioral problems, confusion, decreased concentration, dysphoric mood, hallucinations, self-injury, sleep disturbance and suicidal ideas. The patient is not nervous/anxious and is not hyperactive.     I reviewed the ROS documented by the MA.  All other systems negative.      Objective:    Neurologic Exam    Physical Exam   **The patient is wearing a mask**  Constitutional:  Vital signs reviewed.  No apparent distress.  Well groomed.  Eyes:  No injection, no icterus.    Respiratory:  Normal effort.  Clear to auscultation bilaterally.  Cardiovascular:  Regular rate and rhythm.  No murmurs.  No carotid bruits. Symmetric radial pulses.  Musculoskeletal: Normal station.  Gait steady.  Normal arm swing.  Patient able to walk on heels and toes.  Tandem gait intact.  Romberg negative.  Muscle tone and bulk normal in the bilateral upper and lower extremities.  Strength is 5/5 in the bilateral upper and lower extremities proximally and distally unless otherwise specified in the neurological exam.  Skin:  No rashes.  Warm, dry, and intact.  Psychiatric:  Good mood.  Normal  affect.    Neurologic:  Mental status-  The patient is alert and oriented to person, place and time. Attention/concentration is within normal limits.  Speech is fluent without dysarthria.  The patient is able to name, repeat and follow complex commands without difficulty.  Immediate memory and delayed recall intact (3/3 words immediate and after 4 minutes).  Fund of knowledge normal.  Cranial nerves- Pupils equally round and reactive to light with intact accomodation.  Visual fields intact.  Extraocular movements intact.  Facial sensation intact.   Hearing intact to finger-rub bilaterally.  SCM and trapezius are 5/5 bilaterally.    Motor-  See musculoskeletal above.  No tremor.  Reflexes- 2+ in the bilateral biceps, brachioradialis, patellar and trace achilles.  Toes down-going bilaterally.  Sensation- Intact to pinprick and vibration in bilateral upper and lower extremities symmetrically.  Coordination- Intact to finger tapping and heel knee shin bilaterally.   Gait- See musculoskeletal exam above.       Assessment/Plan:    Mr. Cunningham is a 65-year-old right-handed male with history of rheumatoid arthritis, metastatic melanoma to his brain status post craniotomy, liver, bone, and lung, hypertension, neuropathy who presents today for evaluation of abnormal EEG.    1.  Abnormal EEG-there is focal slowing associated with his resection as well as sharp waves concerning for a predisposition to seizures.  Fortunately the patient has not had any seizures and is on prophylactic levetiracetam 250 mg twice a day without any side effects.  I recommend continuing on the medication long-term.  The patient is agreeable to the plan.  We did discuss that if he did have a seizure there is no driving in [place] for 90 days.    2.  Neuropathy-I recommend for him to stop B6 supplementation as B6 toxicity can actually cause seizures.  We can consider checking a level in the future.    The patient can follow-up on a yearly basis or  sooner if needed.       Problems Addressed this Visit     None      Visit Diagnoses     Abnormal EEG    -  Primary      Diagnoses       Codes Comments    Abnormal EEG    -  Primary ICD-10-CM: R94.01  ICD-9-CM: 794.02

## 2020-12-09 ENCOUNTER — TELEPHONE (OUTPATIENT)
Dept: PHARMACY | Facility: HOSPITAL | Age: 65
End: 2020-12-09

## 2020-12-09 NOTE — TELEPHONE ENCOUNTER
Called pt to see why he wanted the Triamcinolone. He stated that the refill request was a mistake. I will deny in the system.

## 2021-01-19 ENCOUNTER — TELEPHONE (OUTPATIENT)
Dept: ONCOLOGY | Facility: CLINIC | Age: 66
End: 2021-01-19

## 2021-01-19 NOTE — TELEPHONE ENCOUNTER
PT: MEGHNA GUADALUPE    PT WIFE CALLING TO SEE IF [facility] IS THERE SO THAT SHE CAN DROP OFF PAPER WORK FOR MEGHNA'S DISABILITY TO BE FILLED OUT?     PT #: [phone]

## 2021-01-27 RX ORDER — PREDNISONE 20 MG/1
TABLET ORAL
Qty: 30 TABLET | Refills: 3 | OUTPATIENT
Start: 2021-01-27

## 2021-02-18 DIAGNOSIS — C79.31 BRAIN METASTASES: Primary | ICD-10-CM

## 2021-02-18 RX ORDER — LEVETIRACETAM 500 MG/1
TABLET ORAL
Qty: 60 TABLET | Refills: 0 | OUTPATIENT
Start: 2021-02-18

## 2021-02-18 NOTE — TELEPHONE ENCOUNTER
Patient was referred to neurology and saw them last fall with regard to seizure medication.  He needs to speak with them regarding refill.

## 2021-02-22 ENCOUNTER — TELEPHONE (OUTPATIENT)
Dept: NEUROLOGY | Facility: CLINIC | Age: 66
End: 2021-02-22

## 2021-02-22 RX ORDER — LEVETIRACETAM 500 MG/1
250 TABLET ORAL 2 TIMES DAILY
Qty: 60 TABLET | Refills: 11 | Status: SHIPPED | OUTPATIENT
Start: 2021-02-22 | End: 2021-02-22

## 2021-02-22 RX ORDER — LEVETIRACETAM 250 MG/1
250 TABLET ORAL 2 TIMES DAILY
Qty: 180 TABLET | Refills: 3 | Status: SHIPPED | OUTPATIENT
Start: 2021-02-22 | End: 2021-06-23

## 2021-02-22 NOTE — TELEPHONE ENCOUNTER
Attempted to call pt to find out which pharmacy he would like medication sent to. No answer. Left detailed msg for pt to return call.

## 2021-02-22 NOTE — TELEPHONE ENCOUNTER
Caller: PT    Relationship: SELF    Best call back number: [phone]    What medications are you currently taking:   Current Outpatient Medications on File Prior to Visit   Medication Sig Dispense Refill   • acetaminophen (TYLENOL) 325 MG tablet Take 650 mg by mouth Every 6 (Six) Hours As Needed for Mild Pain .     • bacitracin 500 UNIT/GM ointment Apply  topically to the appropriate area as directed 2 (Two) Times a Day. 14 g 0   • BYSTOLIC 5 MG tablet TAKE 1 TABLET DAILY 90 tablet 3   • cyclobenzaprine (FLEXERIL) 5 MG tablet Take  by mouth As Needed.     • folic acid (FOLVITE) 1 MG tablet Take 1 mg by mouth Daily.     • gabapentin (NEURONTIN) 300 MG capsule Take 1 capsule by mouth Daily. 90 capsule 1   • hydroxychloroquine (PLAQUENIL) 200 MG tablet Take 400 mg by mouth Daily.     • levETIRAcetam (KEPPRA) 500 MG tablet Take 0.5 tablets by mouth 2 (Two) Times a Day. 60 tablet 3   • MAGNESIUM PO Take 250 mg by mouth Daily.     • Multiple Vitamins-Minerals (MULTIVITAMIN WITH MINERALS) tablet tablet Take 1 tablet by mouth Daily.     • predniSONE (DELTASONE) 5 MG tablet Take 10 mg by mouth Daily.     • triamcinolone (KENALOG) 0.1 % ointment Apply  topically to the appropriate area as directed See Admin Instructions. Apply topically to the affected area twice daily as directed 30 g 1   • vitamin B-6 (PYRIDOXINE) 100 MG tablet Take 100 mg by mouth Daily.       No current facility-administered medications on file prior to visit.         When did you start taking these medications: NA    Which medication are you concerned about: KEPPRA    Who prescribed you this medication: ELIZABETH MONTEJO    What are your concerns: PATIENT STATED  HAD ASKED HIM TO SEE ABOUT GETTING REFILLS FOR KEPPRA NOW FILLED THROUGH . PATIENT HAS UPCOMING APPT WITH  ON 12-3-21. PLEASE ADVISE.     How long have you been taking these medications: NA    How long have you had these concerns: NA

## 2021-02-22 NOTE — TELEPHONE ENCOUNTER
Pt returned call. He would like medications sent to the [facility] on [facility].     Please review.   Thank you

## 2021-03-02 ENCOUNTER — HOSPITAL ENCOUNTER (OUTPATIENT)
Dept: MRI IMAGING | Facility: HOSPITAL | Age: 66
Discharge: HOME | End: 2021-03-02
Admitting: NEUROLOGICAL SURGERY

## 2021-03-02 DIAGNOSIS — C79.31 BRAIN METASTASES: ICD-10-CM

## 2021-03-02 PROCEDURE — 0 GADOBENATE DIMEGLUMINE 529 MG/ML SOLUTION: Performed by: NEUROLOGICAL SURGERY

## 2021-03-02 PROCEDURE — 82565 ASSAY OF CREATININE: CPT

## 2021-03-02 PROCEDURE — A9577 INJ MULTIHANCE: HCPCS | Performed by: NEUROLOGICAL SURGERY

## 2021-03-02 PROCEDURE — 70553 MRI BRAIN STEM W/O & W/DYE: CPT

## 2021-03-02 RX ADMIN — GADOBENATE DIMEGLUMINE 18 ML: 529 INJECTION, SOLUTION INTRAVENOUS at 13:07

## 2021-03-03 LAB — CREAT BLDA-MCNC: 0.9 MG/DL (ref 0.6–1.3)

## 2021-03-08 ENCOUNTER — TELEPHONE (OUTPATIENT)
Dept: NEUROSURGERY | Facility: CLINIC | Age: 66
End: 2021-03-08

## 2021-03-10 DIAGNOSIS — C78.7 METASTATIC MELANOMA TO LIVER (HCC): ICD-10-CM

## 2021-03-10 DIAGNOSIS — C80.1: ICD-10-CM

## 2021-03-10 RX ORDER — GABAPENTIN 300 MG/1
CAPSULE ORAL
Qty: 90 CAPSULE | Refills: 1 | OUTPATIENT
Start: 2021-03-10

## 2021-03-16 ENCOUNTER — BULK ORDERING (OUTPATIENT)
Dept: CASE MANAGEMENT | Facility: OTHER | Age: 66
End: 2021-03-16

## 2021-03-16 DIAGNOSIS — Z23 IMMUNIZATION DUE: ICD-10-CM

## 2021-04-05 NOTE — PROGRESS NOTES
"Subjective   Patient ID: Gelacio Cunningham is a 65 y.o. male is here today for follow-up with a new MRI Brain that was ordered 09.10.2020.    Today patient is feeling well overall. Patent denies HA's, dizziness, and Visual changes.    Patient, Provider, and MA are all wearing a mask in our office today.     History of Present Illness     This patient returns today. He is doing fairly well. He has no particular complaints.    The following portions of the patient's history were reviewed and updated as appropriate: allergies, current medications, past family history, past medical history, past social history, past surgical history and problem list.    Review of Systems   Constitutional: Negative for chills and fever.   HENT: Negative for congestion.    Musculoskeletal: Negative for back pain, neck pain and neck stiffness.   Neurological: Negative for dizziness, weakness, numbness and headaches.       I have reviewed the review of systems as documented by my MA.      Objective     Vitals:    04/08/21 0846   BP: 130/79   Cuff Size: Adult   Pulse: 80   Temp: 98 °F (36.7 °C)   Weight: 90.7 kg (200 lb)   Height: 170.2 cm (67.01\")     Body mass index is 31.32 kg/m².      Physical Exam  Neurological:      Mental Status: He is alert and oriented to person, place, and time.       Neurologic Exam     Mental Status   Oriented to person, place, and time.           Assessment/Plan   Independent Review of Radiographic Studies:      I personally reviewed the images from the following studies.    I reviewed his MRI done on 2 March. This does show a small area of enhancement in the inferior frontal lobe on the left side. It measures only about 6 mm. I did review the films myself and compared them to films done in August of last year. This does show no evidence of enhancement at that spot.    Medical Decision Making:      I told the patient about the imaging. I told him I thought we should get the radiation therapist to consider " stereotactic radiosurgery to this area. Either way I think we need to follow him up with another MRI in about 3 months. He agrees.    Diagnoses and all orders for this visit:    1. Brain metastases (CMS/HCC) (Primary)  -     Ambulatory Referral to Radiation Oncology  -     MRI Brain With & Without Contrast; Future      Return in about 3 months (around 7/8/2021).

## 2021-04-08 ENCOUNTER — OFFICE VISIT (OUTPATIENT)
Dept: NEUROSURGERY | Facility: CLINIC | Age: 66
End: 2021-04-08

## 2021-04-08 VITALS
DIASTOLIC BLOOD PRESSURE: 79 MMHG | HEART RATE: 80 BPM | BODY MASS INDEX: 31.39 KG/M2 | HEIGHT: 67 IN | SYSTOLIC BLOOD PRESSURE: 130 MMHG | WEIGHT: 200 LBS | TEMPERATURE: 98 F

## 2021-04-08 DIAGNOSIS — C79.31 BRAIN METASTASES: Primary | ICD-10-CM

## 2021-04-08 PROCEDURE — 99213 OFFICE O/P EST LOW 20 MIN: CPT | Performed by: NEUROLOGICAL SURGERY

## 2021-04-08 RX ORDER — LEVETIRACETAM 500 MG/1
TABLET ORAL
COMMUNITY
Start: 2021-03-22 | End: 2021-05-28

## 2021-04-13 ENCOUNTER — CONSULT (OUTPATIENT)
Dept: RADIATION ONCOLOGY | Facility: HOSPITAL | Age: 66
End: 2021-04-13

## 2021-04-13 ENCOUNTER — APPOINTMENT (OUTPATIENT)
Dept: RADIATION ONCOLOGY | Facility: HOSPITAL | Age: 66
End: 2021-04-13

## 2021-04-13 VITALS
HEART RATE: 63 BPM | WEIGHT: 203 LBS | DIASTOLIC BLOOD PRESSURE: 97 MMHG | TEMPERATURE: 97.3 F | OXYGEN SATURATION: 99 % | BODY MASS INDEX: 31.78 KG/M2 | SYSTOLIC BLOOD PRESSURE: 159 MMHG

## 2021-04-13 DIAGNOSIS — C79.31 BRAIN METASTASES: Primary | ICD-10-CM

## 2021-04-13 PROCEDURE — 77334 RADIATION TREATMENT AID(S): CPT | Performed by: RADIOLOGY

## 2021-04-13 PROCEDURE — 77263 THER RADIOLOGY TX PLNG CPLX: CPT | Performed by: RADIOLOGY

## 2021-04-13 PROCEDURE — 99213 OFFICE O/P EST LOW 20 MIN: CPT | Performed by: RADIOLOGY

## 2021-04-13 NOTE — PROGRESS NOTES
Subjective     Francisco Mcdonough MD    No diagnosis found.  Chief complaint:   Brain metastasis  I am seeing the patient today at the request of Dr. Edgardo Mcdonough to evaluate him for radiation therapy to an apparent recurrent left frontal lobe brain metastasis.            Mr. 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  He now presents with approximately two-week history of    He was referred by his PCP for right upper quadrant ultrasound on 1/24/19 for nausea anorexia, and abdominal pain. which noted multiple liver metastases.  CT scan of the abdomen and pelvis confirmed.  CT-guided liver biopsy in 1/25/19 was nondiagnostic.  Colonoscopy was negative.  Additional imaging included a PET scan on 2/1 which describes a single hypermetabolic left upper lobe lesion, multiple small bone lesions and multiple hypermetabolic liver metastases.  The diagnosis of metastatic melanoma was established at CT-guided core liver biopsy on 2/8/19.  He was seen in consultation by Dr. Albin Hummel at the [facility] group and an MR of the brain done on 2/25/19 described 2 apparent metastatic lesions.  The largest  in the anterior portion of left frontal lobe measuring 3.8 x 2.3 cm and a smaller metastatic lesion was noted within the medial portion of the right parietal lobe measuring 5-6 mm in greatest dimension.  There was evidence that these metastatic lesions could be hemorrhagic.      We were asked to evaluate him for SRS/SRT to these 2 lesions.    He was treated between the dates 3/13/2019 and 3/22/2019.  The larger left frontal mass was treated to 30 Mcadams in 5 fractions and the small nodule in the right parietal region was treated with SRS to 24 Gray in a single fraction, with good initial response on imaging.    Follow-up imaging in early May 2020  suggested recurrence in the frontal region and he was taken to the OR by Dr. Edgardo Babin and underwent a left frontal craniotomy with gross total resection.  Pathology noted extensive necrosis with no viable tumor noted.     MRI of the brain done on 3/2/2021 describes a new cortical and subcortical area of enhancement involving the inferior aspect of the left frontal lobe measuring 6 x 4 x 4 mm in size, suspicious for residual/recurrent malignancy.  We are asked to evaluate the patient for possible stereotactic radiation therapy to this left frontal lobe lesion.  A technical issue may be the close proximity of this lesion to his previous frontal lobe treatment field which received 30 Gray in 5 fractions.  We will set up mask construction and CT simulation today and will fuse his current imaging with his previous dosimetry to see if we are able to treat this lesion.  He understands there may be a chance that we cannot.  He is asymptomatic as regards this lesion and otherwise is doing fairly well.                               Review of Systems   HENT: Positive for tinnitus.    Respiratory: Negative.    Cardiovascular: Negative.    Gastrointestinal: Positive for constipation.   Endocrine: Negative.    Genitourinary: Negative.    Musculoskeletal: Positive for arthralgias and back pain.   Neurological: Negative.    Hematological: Bruises/bleeds easily.         Past Medical History:   Diagnosis Date   • Arthritis     Rheumatoid arthritis multiple sites   • Brain tumor (CMS/HCC)    • Cancer (CMS/HCC) 2017    melanoma, right ear, lung, brain, bone   • DDD (degenerative disc disease), lumbar    • H/O Liver masses    • Hypertension    • Liver disease     liver lesions   • Metastatic melanoma to liver (CMS/HCC)    • Neck mass, left 2018   • Peripheral neuropathy    • Rheumatoid arthritis (CMS/HCC)     has had 14 years, was on Methotrexate, Embrel, Humira and now Rituxin         Past Surgical History:   Procedure Laterality  Date   • COLONOSCOPY N/A 2016    Procedure: COLONOSCOPY TO CECUM WITH POLYPECTOMY (COLD BIOPSY);  Surgeon: Antolin Munoz Jr., MD;  Location: [facility] ENDOSCOPY;  Service:    • COLONOSCOPY N/A 2019    Procedure: COLONOSCOPY TO CECUM;  Surgeon: Eduardo Rashid MD;  Location: [facility] ENDOSCOPY;  Service: General   • CRANIOTOMY FOR TUMOR Left 2020    Procedure: Left frontal craniotomy for tumor using stereotactic guidance;  Surgeon: Francisco Mcdonough MD;  Location: [facility] MAIN OR;  Service: Neurosurgery;  Laterality: Left;   • LIVER BIOPSY     • SKIN CANCER EXCISION           Social History     Socioeconomic History   • Marital status:      Spouse name: Angelique   • Number of children: Not on file   • Years of education: Not on file   • Highest education level: Not on file   Tobacco Use   • Smoking status: Former Smoker     Packs/day: 2.00     Years: 25.00     Pack years: 50.00     Types: Cigarettes     Quit date:      Years since quittin.2   • Smokeless tobacco: Never Used   Substance and Sexual Activity   • Alcohol use: No   • Drug use: No   • Sexual activity: Defer         Family History   Problem Relation Age of Onset   • COPD Father    • Cancer Father    • Asthma Brother    • Diabetes Paternal Grandfather    • Malig Hyperthermia Neg Hx           Objective    Physical Exam Awake, alert, spontaneous, no apparent distress.  Affect and thought content appropriate, speech is intact, no evidence of effusion.  Station and gait within normal limits.    Current Outpatient Medications on File Prior to Visit   Medication Sig Dispense Refill   • acetaminophen (TYLENOL) 325 MG tablet Take 650 mg by mouth Every 6 (Six) Hours As Needed for Mild Pain .     • bacitracin 500 UNIT/GM ointment Apply  topically to the appropriate area as directed 2 (Two) Times a Day. 14 g 0   • BYSTOLIC 5 MG tablet TAKE 1 TABLET DAILY 90 tablet 3   • cyclobenzaprine (FLEXERIL) 5 MG tablet Take  by mouth As Needed.     •  folic acid (FOLVITE) 1 MG tablet Take 1 mg by mouth Daily.     • gabapentin (NEURONTIN) 300 MG capsule Take 1 capsule by mouth Daily. 90 capsule 1   • hydroxychloroquine (PLAQUENIL) 200 MG tablet Take 400 mg by mouth Daily.     • levETIRAcetam (KEPPRA) 250 MG tablet Take 1 tablet by mouth 2 (Two) Times a Day. 180 tablet 3   • levETIRAcetam (KEPPRA) 500 MG tablet      • MAGNESIUM PO Take 250 mg by mouth Daily.     • Multiple Vitamins-Minerals (MULTIVITAMIN WITH MINERALS) tablet tablet Take 1 tablet by mouth Daily.     • predniSONE (DELTASONE) 5 MG tablet Take 10 mg by mouth Daily.     • triamcinolone (KENALOG) 0.1 % ointment Apply  topically to the appropriate area as directed See Admin Instructions. Apply topically to the affected area twice daily as directed 30 g 1   • vitamin B-6 (PYRIDOXINE) 100 MG tablet Take 100 mg by mouth Daily.       No current facility-administered medications on file prior to visit.       ALLERGIES:    Allergies   Allergen Reactions   • Sulindac Diarrhea       /97   Pulse 63   Temp 97.3 °F (36.3 °C) (Temporal)   Wt 92.1 kg (203 lb)   SpO2 99%   BMI 31.78 kg/m²      Current Status 1/13/2020   ECOG score 1         Assessment/Plan   Metastatic malignant melanoma with apparent new brain lesion in the inferior left frontal lobe in close proximity to a prior treatment field treated to 30 Mcadams in 5 fractions.  We completed mask construction and CT simulation this morning and will fuse his current imaging with his prior dosimetry to see if additional radiation dose can be given safely to this area.  He had many good questions which I believe were answered to his satisfaction and informed consent was obtained.  We will contact him when the planning is complete    [facility] Onc ECOG Status: (0) Fully active, able to carry on all predisease performance without restriction       I spent greater than 45 minutes of face-to-face time with the patient to include simulation and 25 minutes  that time was spent in counseling and coordination of care to include discussion of indications, goals, logistics, alternatives, and risks both common and rare.  Reviewed      Cleveland Rosado MD

## 2021-04-14 PROCEDURE — 77470 SPECIAL RADIATION TREATMENT: CPT | Performed by: RADIOLOGY

## 2021-04-15 PROCEDURE — 77300 RADIATION THERAPY DOSE PLAN: CPT | Performed by: RADIOLOGY

## 2021-04-15 PROCEDURE — 77338 DESIGN MLC DEVICE FOR IMRT: CPT | Performed by: RADIOLOGY

## 2021-04-15 PROCEDURE — 77301 RADIOTHERAPY DOSE PLAN IMRT: CPT | Performed by: RADIOLOGY

## 2021-04-26 ENCOUNTER — DOCUMENTATION (OUTPATIENT)
Dept: RADIATION ONCOLOGY | Facility: HOSPITAL | Age: 66
End: 2021-04-26

## 2021-04-26 ENCOUNTER — RADIATION ONCOLOGY WEEKLY ASSESSMENT (OUTPATIENT)
Dept: RADIATION ONCOLOGY | Facility: HOSPITAL | Age: 66
End: 2021-04-26

## 2021-04-26 DIAGNOSIS — C79.31 BRAIN METASTASES: Primary | ICD-10-CM

## 2021-04-26 PROCEDURE — FACE2FACE: Performed by: RADIOLOGY

## 2021-04-26 PROCEDURE — 77373 STRTCTC BDY RAD THER TX DLVR: CPT | Performed by: RADIOLOGY

## 2021-04-26 PROCEDURE — 77336 RADIATION PHYSICS CONSULT: CPT | Performed by: RADIOLOGY

## 2021-04-26 NOTE — PROGRESS NOTES
"  Physician Stereotactic Management Note    Diagnosis:   Brain metastases (CMS/HCC)    Doing well pretreatment, no problems.    Treatment Number and Dose:    Treatment #1/1    Inferior frontal lobe    Notes on treatment course, films, medical progress and plan:    Doing well. Tolerated treatment without difficulty. No problems or questions.  Dr. Mcdonough has him scheduled for follow-up in imaging the first week in July.  He will call to set up an appointment 3 to 4 days after that study is complete for follow-up.    Subjective     I was personally present at the machine for the delivery of the above treatment.     I provided direct supervision of the patient's set up, treatment parameters and image guidance. I provided corrections and approval of the above prior to the treatment, in real time. I was present for any adjustments required due to patient motion, target movement or equipment issues.    The ongoing images for localization, tumor tracking, gating and beam's eye view localization images will also be approved.     Problem added:  None  Medications added:   None  Ancillary referrals made: None    I have reviewed and marked as \"reviewed\" the current medications, allergies and problem list in the patients EMR.          "

## 2021-05-01 ENCOUNTER — APPOINTMENT (OUTPATIENT)
Dept: RADIATION ONCOLOGY | Facility: HOSPITAL | Age: 66
End: 2021-05-01

## 2021-05-03 PROCEDURE — 77372 SRS LINEAR BASED: CPT | Performed by: RADIOLOGY

## 2021-05-03 PROCEDURE — 77432 STEREOTACTIC RADIATION TRMT: CPT | Performed by: RADIOLOGY

## 2021-05-28 ENCOUNTER — OFFICE VISIT (OUTPATIENT)
Dept: FAMILY MEDICINE CLINIC | Facility: CLINIC | Age: 66
End: 2021-05-28

## 2021-05-28 VITALS
HEIGHT: 67 IN | WEIGHT: 205 LBS | OXYGEN SATURATION: 99 % | HEART RATE: 75 BPM | TEMPERATURE: 98.7 F | DIASTOLIC BLOOD PRESSURE: 82 MMHG | BODY MASS INDEX: 32.18 KG/M2 | SYSTOLIC BLOOD PRESSURE: 142 MMHG

## 2021-05-28 DIAGNOSIS — C79.31 BRAIN METASTASES: ICD-10-CM

## 2021-05-28 DIAGNOSIS — I10 ESSENTIAL HYPERTENSION: Primary | ICD-10-CM

## 2021-05-28 DIAGNOSIS — E78.2 MIXED HYPERLIPIDEMIA: ICD-10-CM

## 2021-05-28 DIAGNOSIS — C78.7 METASTATIC MELANOMA TO LIVER (HCC): ICD-10-CM

## 2021-05-28 PROCEDURE — 99213 OFFICE O/P EST LOW 20 MIN: CPT | Performed by: INTERNAL MEDICINE

## 2021-06-23 ENCOUNTER — OFFICE VISIT (OUTPATIENT)
Dept: FAMILY MEDICINE CLINIC | Facility: CLINIC | Age: 66
End: 2021-06-23

## 2021-06-23 VITALS
WEIGHT: 206.2 LBS | HEIGHT: 67 IN | DIASTOLIC BLOOD PRESSURE: 80 MMHG | RESPIRATION RATE: 16 BRPM | SYSTOLIC BLOOD PRESSURE: 130 MMHG | BODY MASS INDEX: 32.36 KG/M2

## 2021-06-23 DIAGNOSIS — K12.1 STOMATITIS: Primary | ICD-10-CM

## 2021-06-23 PROCEDURE — 99213 OFFICE O/P EST LOW 20 MIN: CPT | Performed by: INTERNAL MEDICINE

## 2021-06-23 RX ORDER — LEVETIRACETAM 500 MG/1
250 TABLET ORAL 2 TIMES DAILY
COMMUNITY
Start: 2021-06-21 | End: 2022-05-18

## 2021-06-23 NOTE — PROGRESS NOTES
Answers for HPI/ROS submitted by the patient on 6/22/2021  What is the primary reason for your visit?: Other  Please describe your symptoms.: Mouth irritation  Have you had these symptoms before?: No  How long have you been having these symptoms?: Greater than 2 weeks  Please list any medications you are currently taking for this condition.: Triamcinolone dental paste  Please describe any probable cause for these symptoms. : Denture irritation, allergies, oral infection    Subjective Chief complaint is of mouth irritation  Gelacio Cunningham is a 66 y.o. male.     History of Present Illness Gelacio is here today for mouth irritation. This was on his upper and lower gums. They were sore and cracked on the inner side. His dentures seem to be bothering him. He tried numerous things to make it better including rinsing with salt water and a dental gel. Eventually it seemed to get better. It is largely gone now. He is on some steroids fairly chronically. He has not had any thrush.    The following portions of the patient's history were reviewed and updated as appropriate: allergies, current medications, past family history, past medical history, past social history, past surgical history and problem list.    Review of Systems   Constitutional: Negative for chills and fever.   HENT: Positive for mouth sores.        Objective   Physical Exam  Vitals and nursing note reviewed.   HENT:      Nose: Congestion present.      Mouth/Throat:      Mouth: Mucous membranes are dry.      Pharynx: Oropharynx is clear. No oropharyngeal exudate.      Comments: I currently do not see any aphthous ulcers or stomatitis.  Lymphadenopathy:      Cervical: No cervical adenopathy.           Assessment/Plan   Diagnoses and all orders for this visit:    1. Stomatitis (Primary)    Gelacio is here today for mouth sores. Currently I do not see any evidence of any aphthous ulcers. Whatever was present seems to have healed. I did advise if he gets ulcers like  Contacted patient and advised faxed 3 OV with paperwork.  No further action needed at this time.    trip and fall this AM, unwitnessed, no Head injury or LOC. Patient c/o left knee pain- no anticoagulants this again that he should mix equal parts of Kaopectate and Benadryl and switch that around to cope things several times per day. We may also consider treatment for yeast infection however no oral thrush was seen today.

## 2021-07-03 ENCOUNTER — HOSPITAL ENCOUNTER (OUTPATIENT)
Dept: MRI IMAGING | Facility: HOSPITAL | Age: 66
Discharge: HOME | End: 2021-07-03
Admitting: NEUROLOGICAL SURGERY

## 2021-07-03 DIAGNOSIS — C79.31 BRAIN METASTASES: ICD-10-CM

## 2021-07-03 PROCEDURE — 70553 MRI BRAIN STEM W/O & W/DYE: CPT

## 2021-07-03 PROCEDURE — 82565 ASSAY OF CREATININE: CPT

## 2021-07-03 PROCEDURE — 0 GADOBENATE DIMEGLUMINE 529 MG/ML SOLUTION: Performed by: NEUROLOGICAL SURGERY

## 2021-07-03 PROCEDURE — A9577 INJ MULTIHANCE: HCPCS | Performed by: NEUROLOGICAL SURGERY

## 2021-07-03 RX ADMIN — GADOBENATE DIMEGLUMINE 19 ML: 529 INJECTION, SOLUTION INTRAVENOUS at 10:47

## 2021-07-05 LAB — CREAT BLDA-MCNC: 1.1 MG/DL (ref 0.6–1.3)

## 2021-07-07 NOTE — PROGRESS NOTES
"Subjective   Patient ID: Gelacio Cunningham is a 66 y.o. male is here today for 3 month follow-up with a new MRI Brain that was ordered on 04.08.2021.    Today patient states he believes his vision is worsening but he does believe it to be related to his melanoma. Patient denies dizziness, lightheadedness, and HA's.     Patient, Provider, and MA are all wearing a mask in our office today.     History of Present Illness    This patient has some issues with his vision but is otherwise doing okay.  He really has nothing else in the way of symptoms.  He did have stereotactic radiosurgery in late April.    The following portions of the patient's history were reviewed and updated as appropriate: allergies, current medications, past family history, past medical history, past social history, past surgical history and problem list.    Review of Systems   Constitutional: Negative for chills and fever.   HENT: Negative for congestion.    Eyes: Positive for visual disturbance.   Neurological: Negative for dizziness, light-headedness and headaches.       I have reviewed the review of systems as documented by my MA.      Objective     Vitals:    07/08/21 0856   BP: 130/80   Cuff Size: Adult   Pulse: 87   Temp: 98.7 °F (37.1 °C)   Weight: 93.4 kg (206 lb)   Height: 170.2 cm (67\")     Body mass index is 32.26 kg/m².      Physical Exam  Neurological:      Mental Status: He is alert and oriented to person, place, and time.       Neurologic Exam     Mental Status   Oriented to person, place, and time.           Assessment/Plan   Independent Review of Radiographic Studies:      I personally reviewed the images from the following studies.    I reviewed his MRI done on 3 July of this year.  This does show an area of enhancement in the left frontal lobe which we already knew about.  This is the area that was radiated.  This does not show any change in size at all.  There is some scar tissue around the area of surgery as well but that is also " unchanged.    Medical Decision Making:      I told the patient I thought we should scan him again in about 6 months.  He is in agreement with that plan.    Diagnoses and all orders for this visit:    1. Brain metastases (CMS/HCC) (Primary)  -     MRI Brain With & Without Contrast; Future      Return in about 6 months (around 1/8/2022).

## 2021-07-08 ENCOUNTER — OFFICE VISIT (OUTPATIENT)
Dept: NEUROSURGERY | Facility: CLINIC | Age: 66
End: 2021-07-08

## 2021-07-08 VITALS
DIASTOLIC BLOOD PRESSURE: 80 MMHG | TEMPERATURE: 98.7 F | HEIGHT: 67 IN | BODY MASS INDEX: 32.33 KG/M2 | SYSTOLIC BLOOD PRESSURE: 130 MMHG | HEART RATE: 87 BPM | WEIGHT: 206 LBS

## 2021-07-08 DIAGNOSIS — C79.31 BRAIN METASTASES: Primary | ICD-10-CM

## 2021-07-08 PROCEDURE — 99213 OFFICE O/P EST LOW 20 MIN: CPT | Performed by: NEUROLOGICAL SURGERY

## 2021-08-16 RX ORDER — NEBIVOLOL HYDROCHLORIDE 5 MG/1
TABLET ORAL
Qty: 90 TABLET | Refills: 3 | Status: SHIPPED | OUTPATIENT
Start: 2021-08-16 | End: 2021-08-19 | Stop reason: SDUPTHER

## 2021-08-19 ENCOUNTER — TELEPHONE (OUTPATIENT)
Dept: FAMILY MEDICINE CLINIC | Facility: CLINIC | Age: 66
End: 2021-08-19

## 2021-08-19 RX ORDER — LEVETIRACETAM 250 MG/1
TABLET ORAL
Qty: 180 TABLET | Refills: 3 | Status: SHIPPED | OUTPATIENT
Start: 2021-08-19 | End: 2022-05-18 | Stop reason: SDUPTHER

## 2021-08-19 RX ORDER — NEBIVOLOL 5 MG/1
5 TABLET ORAL DAILY
Qty: 90 TABLET | Refills: 3 | Status: SHIPPED | OUTPATIENT
Start: 2021-08-19 | End: 2021-11-29

## 2021-08-19 NOTE — TELEPHONE ENCOUNTER
Caller: Angelique Cunningham    Relationship: Emergency Contact    Best call back number: [phone]    What was the call regarding: PATIENT'S BYSTOLIC WAS SENT TO [facility] W[facility].    [facility] - [place], [place] - [address] - [phone]  - [phone] FX  [phone]

## 2021-10-28 ENCOUNTER — TELEPHONE (OUTPATIENT)
Dept: CASE MANAGEMENT | Facility: OTHER | Age: 66
End: 2021-10-28

## 2021-10-28 NOTE — TELEPHONE ENCOUNTER
Call placed to pt to schedule AWV. Message left for pt to contact MD office to schedule as our records show this is overdue.

## 2021-12-22 ENCOUNTER — OFFICE VISIT (OUTPATIENT)
Dept: FAMILY MEDICINE CLINIC | Facility: CLINIC | Age: 66
End: 2021-12-22

## 2021-12-22 VITALS
DIASTOLIC BLOOD PRESSURE: 76 MMHG | HEIGHT: 67 IN | HEART RATE: 73 BPM | BODY MASS INDEX: 31.52 KG/M2 | OXYGEN SATURATION: 97 % | SYSTOLIC BLOOD PRESSURE: 132 MMHG | WEIGHT: 200.8 LBS

## 2021-12-22 DIAGNOSIS — B02.9 HERPES ZOSTER WITHOUT COMPLICATION: ICD-10-CM

## 2021-12-22 DIAGNOSIS — I10 ESSENTIAL HYPERTENSION: Primary | ICD-10-CM

## 2021-12-22 PROCEDURE — 99213 OFFICE O/P EST LOW 20 MIN: CPT | Performed by: INTERNAL MEDICINE

## 2021-12-22 NOTE — PROGRESS NOTES
Answers for HPI/ROS submitted by the patient on 12/18/2021  What is the primary reason for your visit?: High Blood Pressure  Chronicity: chronic  Onset: more than 1 year ago  Progression since onset: resolved  Condition status: controlled  anxiety: No  blurred vision: No  chest pain: No  headaches: No  malaise/fatigue: No  neck pain: No  orthopnea: No  palpitations: No  peripheral edema: No  PND: No  shortness of breath: No  sweats: No  Agents associated with hypertension: no associated agents  CAD risks: obesity  Compliance problems: no compliance problems    Subjective Chief complaint is checkup on blood pressure  Gelacio Cunningham is a 66 y.o. male.     History of Present Illness.eGlacio is here today for checkup on his blood pressure.  I did retake it at 132/76.  Initially it was 140/88.  He did have a breaking out on his back.  He thought initially it was an insect bite.  It has been hurting for about a week.  He has been using some topical creams.  He is on immunosuppressant medications.  The following portions of the patient's history were reviewed and updated as appropriate: allergies, current medications, past family history, past medical history, past social history, past surgical history and problem list.    Review of Systems   Eyes: Negative for blurred vision.   Respiratory: Negative for shortness of breath.    Cardiovascular: Negative for chest pain and palpitations.   Musculoskeletal: Negative for neck pain.       Objective   Physical Exam  Cardiovascular:      Rate and Rhythm: Normal rate and regular rhythm.   Pulmonary:      Effort: Pulmonary effort is normal.      Breath sounds: No wheezing, rhonchi or rales.   Skin:     Comments: In the lower thoracic region there is a small patch of vesicles on erythematous base.  The majority of these are beginning to scab over.   Neurological:      Mental Status: He is alert.           Assessment/Plan   Diagnoses and all orders for this visit:    1. Essential  hypertension (Primary)    2. Herpes zoster without complication      Don is here today for follow-up.  His blood pressure seems to be doing okay.  It does appear that he has shingles.  His date of onset was a week ago and I therefore doubt he will get much benefit from being on Zovirax.  I did advise topical creams for the itching and stinging.

## 2022-01-07 ENCOUNTER — HOSPITAL ENCOUNTER (OUTPATIENT)
Dept: MRI IMAGING | Facility: HOSPITAL | Age: 67
Discharge: HOME | End: 2022-01-07
Admitting: NEUROLOGICAL SURGERY

## 2022-01-07 DIAGNOSIS — C79.31 BRAIN METASTASES: ICD-10-CM

## 2022-01-07 PROCEDURE — 70553 MRI BRAIN STEM W/O & W/DYE: CPT

## 2022-01-07 PROCEDURE — A9577 INJ MULTIHANCE: HCPCS | Performed by: NEUROLOGICAL SURGERY

## 2022-01-07 PROCEDURE — 82565 ASSAY OF CREATININE: CPT

## 2022-01-07 PROCEDURE — 0 GADOBENATE DIMEGLUMINE 529 MG/ML SOLUTION: Performed by: NEUROLOGICAL SURGERY

## 2022-01-07 RX ADMIN — GADOBENATE DIMEGLUMINE 20 ML: 529 INJECTION, SOLUTION INTRAVENOUS at 11:47

## 2022-01-09 LAB — CREAT BLDA-MCNC: 0.9 MG/DL (ref 0.6–1.3)

## 2022-01-12 NOTE — PROGRESS NOTES
"Subjective   Patient ID: Gelacio Cunningham is a 66 y.o. male is here today for 6 month follow-up with a new MRI Brain that was done on 01.07.2022 at [facility].    Today Patient states that he feels welll overall. Patient denies HA's, dizziness, lightheadedness, and/or visual changes    Patient , Provider, and MA are all wearing a mask in our office today.     History of Present Illness    This patient returns today.  He actually feels pretty good overall.  He has no headaches and no other neurologic symptoms at all.  He apparently has discussed the situation with his oncologist and they have ceased treating him at this point.  They felt there was nothing else they could do.    The following portions of the patient's history were reviewed and updated as appropriate: allergies, current medications, past family history, past medical history, past social history, past surgical history and problem list.    Review of Systems   Constitutional: Negative for chills and fever.   HENT: Negative for congestion.    Eyes: Negative for visual disturbance.   Neurological: Negative for dizziness, light-headedness and headaches.       I have reviewed the review of systems as documented by my MA.      Objective     Vitals:    01/13/22 0834   BP: 135/80   Cuff Size: Adult   Pulse: 90   Temp: 98 °F (36.7 °C)   Weight: 91.1 kg (200 lb 12.8 oz)   Height: 170.2 cm (67.01\")     Body mass index is 31.44 kg/m².      Physical Exam  Neurological:      Mental Status: He is alert and oriented to person, place, and time.       Neurologic Exam     Mental Status   Oriented to person, place, and time.           Assessment/Plan   Independent Review of Radiographic Studies:      I personally reviewed the images from the following studies.    I reviewed an MRI of his brain done on 7 January.  This does show very slight increase in the enhancing area in the left frontal lobe.  There are a couple of new areas that are small as well.  Overall there is not a huge " change between now and July of last year.    Medical Decision Making:      I told the patient that since he has liver, lung and brain metastases I really cannot make a good case to take out the lesions in his brain a second time.  They are enlarging at a very slow rate and frankly I think the other areas of metastasis are much more likely to cause him life-threatening problems before the brain does.  He is comfortable with the decision not to treat any further.  I told him to call me if he has any other questions or concerns and I would certainly be glad to see him back at any time.    Diagnoses and all orders for this visit:    1. Brain metastases (HCC) (Primary)      Return if symptoms worsen or fail to improve.

## 2022-01-13 ENCOUNTER — OFFICE VISIT (OUTPATIENT)
Dept: NEUROSURGERY | Facility: CLINIC | Age: 67
End: 2022-01-13

## 2022-01-13 VITALS
BODY MASS INDEX: 31.52 KG/M2 | WEIGHT: 200.8 LBS | HEIGHT: 67 IN | DIASTOLIC BLOOD PRESSURE: 80 MMHG | TEMPERATURE: 98 F | SYSTOLIC BLOOD PRESSURE: 135 MMHG | HEART RATE: 90 BPM

## 2022-01-13 DIAGNOSIS — C79.31 BRAIN METASTASES: Primary | ICD-10-CM

## 2022-01-13 PROCEDURE — 99213 OFFICE O/P EST LOW 20 MIN: CPT | Performed by: NEUROLOGICAL SURGERY

## 2022-05-18 ENCOUNTER — OFFICE VISIT (OUTPATIENT)
Dept: NEUROLOGY | Facility: CLINIC | Age: 67
End: 2022-05-18

## 2022-05-18 VITALS
OXYGEN SATURATION: 99 % | HEART RATE: 64 BPM | HEIGHT: 67 IN | DIASTOLIC BLOOD PRESSURE: 78 MMHG | BODY MASS INDEX: 32.18 KG/M2 | WEIGHT: 205 LBS | SYSTOLIC BLOOD PRESSURE: 136 MMHG

## 2022-05-18 DIAGNOSIS — R94.01 ABNORMAL EEG: Primary | ICD-10-CM

## 2022-05-18 PROCEDURE — 99213 OFFICE O/P EST LOW 20 MIN: CPT | Performed by: PSYCHIATRY & NEUROLOGY

## 2022-05-18 RX ORDER — LEVETIRACETAM 250 MG/1
250 TABLET ORAL 2 TIMES DAILY
Qty: 180 TABLET | Refills: 3 | Status: SHIPPED | OUTPATIENT
Start: 2022-05-18 | End: 2023-03-27

## 2022-05-18 NOTE — PROGRESS NOTES
Subjective:     Patient ID: Gelacio Cunningham is a 67 y.o. male.    Mr. Cunningham is a 67-year-old right-handed male with a history of rheumatoid arthritis, metastatic melanoma status postcraniotomy, hypertension, and neuropathy who presents to the neurology clinic today as established patient for follow-up for an abnormal EEG.  The patient was last seen as a new patient on December 3, 2020.  For details regarding his history, please refer to that note.  The patient was on prophylactic levetiracetam 250 mg twice a day.  He had an EEG done in 2020 that showed focal left frontal slowing and sharp waves.  The patient has never had a seizure.  He denies any seizures at today's visit.  He is still taking the medication.  He denies any side effects the medicine reports that it is affordable.  At his last visit we felt like he was at high risk for possibly having seizures and therefore we continue the medication.  The patient reports that driving is important to him because his wife has visual impairment.    The following portions of the patient's history were reviewed and updated as appropriate: allergies, current medications, past family history, past medical history, past social history, past surgical history and problem list.    Review of Systems     Objective:    Neurologic Exam    Physical Exam    Assessment/Plan:    The patient is a 67-year-old right-handed male with a history of rheumatoid arthritis, metastatic melanoma, hypertension, neuropathy who presents today for follow-up.    1.  Abnormal EEG-overall the risks of a seizure at this point would likely outweigh the risks of the medication.  I recommend for the patient to continue on the medication long-term.  We can see him on a yearly basis however if his primary care physician feels comfortable prescribing the medication he can follow-up on an as-needed basis.    A total of 20 minutes of time was spent on this encounter today.  This includes reviewing the patient's  records, face-to-face time, and documentation.       Problems Addressed this Visit    None     Visit Diagnoses     Abnormal EEG    -  Primary      Diagnoses       Codes Comments    Abnormal EEG    -  Primary ICD-10-CM: R94.01  ICD-9-CM: 794.02

## 2022-05-18 NOTE — PATIENT INSTRUCTIONS
[facility]  Lata Farias MD  Neurology clinic  [phone]    With anti-seizure medications, you may initially notice side effects of fatigue, drowsiness, unsteadiness, and dizziness.  Other possible side effects include nausea, abdominal pain, headache, blurry or double vision, slurred speech and mood changes.  Generally, patients will noticed these symptoms when the medication is first started or with higher doses and will go away with time.    It is import to consistently take your medication every day.  Missing just one dose may put you at risk for a breakthrough seizure.  Consider using reminders on your phone or a pill box.    If you develop a rash, please call the neurology clinic immediately or notify another healthcare professional, as this may be potentially life-threatening.  If you are unable to reach a healthcare professional, go to the emergency room immediately for further evaluation.    If you develop thoughts of wanting to hurt yourself or others, please call the neurology clinic immediately to notify another healthcare professional.  If you are unable to reach a healthcare professional, go to the emergency room immediately for further evaluation.    It is the [place] state law that you cannot drive within 90 days of a seizure.    You should avoid certain activities that if you were to have a seizure, you could harm yourself or others. In general, it is recommended that you avoid operating heavy machinery or power tools, swimming or taking baths by yourself (showers are ok), don't stand over open flames, don't get on high ladders or the roof.  I also recommend to avoid sleeping on your stomach.    For further information on epilepsy and resources available to patients and their families, please visit the [facility] at www.efky.org or call [phone].    **Check out the [facility]'s monthly Art Group Gathering.  They are  located at [facility], [address] [place] [address].  Call Ruthann Martell at [phone] or email her at [email or web address] for the dates of future gatherings.**      **If you have having memory problems, consider HOBSCOTCH (Home-Based Self-management and Cognitive Training Changes lives).  It is an 8 week self-management program for adults with epilepsy and memory problems.  The program is free at the [facility].  Contact Sailaja De at [phone] or [email or web address].**

## 2022-07-11 ENCOUNTER — OFFICE VISIT (OUTPATIENT)
Dept: FAMILY MEDICINE CLINIC | Facility: CLINIC | Age: 67
End: 2022-07-11

## 2022-07-11 VITALS
OXYGEN SATURATION: 100 % | SYSTOLIC BLOOD PRESSURE: 136 MMHG | BODY MASS INDEX: 31.39 KG/M2 | WEIGHT: 200 LBS | DIASTOLIC BLOOD PRESSURE: 74 MMHG | HEIGHT: 67 IN | HEART RATE: 70 BPM

## 2022-07-11 DIAGNOSIS — H91.92 HEARING LOSS OF LEFT EAR, UNSPECIFIED HEARING LOSS TYPE: ICD-10-CM

## 2022-07-11 DIAGNOSIS — H66.92 OTITIS OF LEFT EAR: Primary | ICD-10-CM

## 2022-07-11 PROCEDURE — 99214 OFFICE O/P EST MOD 30 MIN: CPT | Performed by: INTERNAL MEDICINE

## 2022-07-11 RX ORDER — MONTELUKAST SODIUM 10 MG/1
10 TABLET ORAL NIGHTLY
Qty: 30 TABLET | Refills: 1 | Status: SHIPPED | OUTPATIENT
Start: 2022-07-11

## 2022-07-11 RX ORDER — FLUTICASONE PROPIONATE 50 MCG
2 SPRAY, SUSPENSION (ML) NASAL DAILY
Qty: 16 G | Refills: 1 | Status: SHIPPED | OUTPATIENT
Start: 2022-07-11

## 2022-07-11 RX ORDER — AMOXICILLIN 875 MG/1
875 TABLET, COATED ORAL 2 TIMES DAILY
Qty: 14 TABLET | Refills: 0 | Status: SHIPPED | OUTPATIENT
Start: 2022-07-11 | End: 2022-07-29

## 2022-07-11 NOTE — PROGRESS NOTES
Answers for HPI/ROS submitted by the patient on 7/8/2022  What is the primary reason for your visit?: Other  Please describe your symptoms.: Sudden loss of hearing in left ear, occasionally hearing returns briefly.  Have you had these symptoms before?: No  How long have you been having these symptoms?: 5-7 days  Please list any medications you are currently taking for this condition.: None  Please describe any probable cause for these symptoms. : Recent illness (unidentified flu vs covid). Possibly recurring seasonal sinus difficulty.    Subjective Chief complaint is hearing loss in the left ear  Gelacio Cunningham is a 67 y.o. male.     History of Present Illness Gelacio is here today for some hearing loss in his left ear.  This is been going on for approximately a week.  When he is recumbent he can hear okay but when he sits up his head gets congested and his hearing gets worse.  He is not really having any ear pain or discharge.  He is not having any more ringing than usual.    The following portions of the patient's history were reviewed and updated as appropriate: allergies, current medications, past family history, past medical history, past social history, past surgical history and problem list.    Review of Systems   Constitutional: Negative for chills and fever.   HENT: Positive for hearing loss. Negative for ear discharge and ear pain.    Respiratory: Negative for cough.        Objective   Physical Exam  Vitals and nursing note reviewed.   HENT:      Right Ear: Tympanic membrane normal.      Ears:      Comments: The left tympanic membrane is somewhat dull and retracted.     Nose: Congestion present.   Neurological:      Mental Status: He is alert.           Assessment & Plan   Diagnoses and all orders for this visit:    1. Otitis of left ear (Primary)    2. Hearing loss of left ear, unspecified hearing loss type    Other orders  -     fluticasone (Flonase) 50 MCG/ACT nasal spray; 2 sprays into the nostril(s)  as directed by provider Daily.  Dispense: 16 g; Refill: 1  -     montelukast (Singulair) 10 MG tablet; Take 1 tablet by mouth Every Night.  Dispense: 30 tablet; Refill: 1  -     amoxicillin (AMOXIL) 875 MG tablet; Take 1 tablet by mouth 2 (Two) Times a Day.  Dispense: 14 tablet; Refill: 0      Don is here today for some hearing loss in his left ear.  I believe he has a effusion likely due to some eustachian tube dysfunction.  I am going to treat him with some fluticasone and Singulair as well as amoxicillin.

## 2022-07-29 ENCOUNTER — OFFICE VISIT (OUTPATIENT)
Dept: FAMILY MEDICINE CLINIC | Facility: CLINIC | Age: 67
End: 2022-07-29

## 2022-07-29 VITALS
HEIGHT: 67 IN | SYSTOLIC BLOOD PRESSURE: 142 MMHG | WEIGHT: 193 LBS | HEART RATE: 55 BPM | DIASTOLIC BLOOD PRESSURE: 78 MMHG | OXYGEN SATURATION: 99 % | BODY MASS INDEX: 30.29 KG/M2

## 2022-07-29 DIAGNOSIS — Z48.02 VISIT FOR SUTURE REMOVAL: ICD-10-CM

## 2022-07-29 DIAGNOSIS — S65.513S: Primary | ICD-10-CM

## 2022-07-29 PROCEDURE — 99213 OFFICE O/P EST LOW 20 MIN: CPT | Performed by: INTERNAL MEDICINE

## 2022-07-29 RX ORDER — PREDNISONE 1 MG/1
TABLET ORAL
COMMUNITY
Start: 2022-07-18

## 2022-07-29 NOTE — PROGRESS NOTES
Subjective Chief complaint is suture removal  Gelacio Cunningham is a 67 y.o. male.     History of Present Illness.Gelacio is here today for suture removal.  He cut his left third digit on a table salt.  He required 9 sutures in the emergency room.  That was approximately a week ago.  He is up-to-date on his tetanus shot.    The following portions of the patient's history were reviewed and updated as appropriate: allergies, current medications, past family history, past medical history, past social history, past surgical history and problem list.    Review of Systems   Constitutional: Negative for chills and fever.       Objective   Physical Exam  Vitals and nursing note reviewed.   Constitutional:       Appearance: Normal appearance.   Skin:     Comments: There is a longitudinal laceration extending from the base of the left third digit across the PIP joint extending to the DIP joint.  The sutures were somewhat buried and overgrown.  I was able to remove all the sutures visible.   Neurological:      Mental Status: He is alert.           Assessment & Plan   Diagnoses and all orders for this visit:    1. Laceration of blood vessel of left middle finger, sequela (Primary)    2. Visit for suture removal    Gelacio is here today for follow-up.  He does have a laceration of his left middle finger that looks to be fairly well approximated.  The sutures that were visible were removed.  I believe I have gotten all of them.  However if he notices protruding sutures he is to give me a call.  I did use some butterfly Band-Aids to keep the wound approximated.  I did encourage him to continue wearing the splint given at the emergency room.

## 2022-10-11 ENCOUNTER — TELEPHONE (OUTPATIENT)
Dept: FAMILY MEDICINE CLINIC | Facility: CLINIC | Age: 67
End: 2022-10-11

## 2023-03-27 RX ORDER — LEVETIRACETAM 250 MG/1
TABLET ORAL
Qty: 180 TABLET | Refills: 3 | Status: SHIPPED | OUTPATIENT
Start: 2023-03-27

## (undated) DEVICE — PENCL E/S ULTRAVAC TELESCP NOSE HOLSTR 10FT

## (undated) DEVICE — ANTIBACTERIAL UNDYED BRAIDED (POLYGLACTIN 910), SYNTHETIC ABSORBABLE SUTURE: Brand: COATED VICRYL

## (undated) DEVICE — 1010 S-DRAPE TOWEL DRAPE 10/BX: Brand: STERI-DRAPE™

## (undated) DEVICE — SPHR MARKR STEALTH STATION

## (undated) DEVICE — CONN TBG Y 5 IN 1 LF STRL

## (undated) DEVICE — SUT NUROLON 4/0 TF18 CR8 I8IN C584D

## (undated) DEVICE — MAYFIELD® DISPOSABLE ADULT SKULL PIN (PLASTIC BASE): Brand: MAYFIELD®

## (undated) DEVICE — TUBING, SUCTION, 1/4" X 20', STRAIGHT: Brand: MEDLINE INDUSTRIES, INC.

## (undated) DEVICE — THE TORRENT IRRIGATION SCOPE CONNECTOR IS USED WITH THE TORRENT IRRIGATION TUBING TO PROVIDE IRRIGATION FLUIDS SUCH AS STERILE WATER DURING GASTROINTESTINAL ENDOSCOPIC PROCEDURES WHEN USED IN CONJUNCTION WITH AN IRRIGATION PUMP (OR ELECTROSURGICAL UNIT).: Brand: TORRENT

## (undated) DEVICE — GLV SURG BIOGEL LTX PF 7

## (undated) DEVICE — DRSNG SURESITE123 6X8IN

## (undated) DEVICE — SOL IRR PHYSIOSOL BTL 1000ML

## (undated) DEVICE — DRSNG TELFA PAD NONADH STR 1S 3X8IN

## (undated) DEVICE — PLT CVR LEVELONE BURHL LP CONTRL .3X12X1.5MM: Type: IMPLANTABLE DEVICE | Site: CRANIAL | Status: NON-FUNCTIONAL

## (undated) DEVICE — MARKR SKIN W/RULR AND LBL

## (undated) DEVICE — CANN NASL CO2 TRULINK W/O2 A/

## (undated) DEVICE — PK CRANI 40

## (undated) DEVICE — TUBING, SUCTION, 1/4" X 10', STRAIGHT: Brand: MEDLINE

## (undated) DEVICE — CVR PROB 96IN LF STRL

## (undated) DEVICE — Device

## (undated) DEVICE — GLV SURG SENSICARE W/ALOE PF LF 7.5 STRL

## (undated) DEVICE — Device: Brand: DEFENDO AIR/WATER/SUCTION AND BIOPSY VALVE

## (undated) DEVICE — DURAHOOK 1/4HOOK PK/6

## (undated) DEVICE — 2.3MM TAPERED ROUTER

## (undated) DEVICE — PERFORATOR CDM WO/ DURAGUARD 14/11

## (undated) DEVICE — SMOKE EVACUATION TUBING WITH 7/8 IN TO 1/4 IN REDUCER: Brand: BUFFALO FILTER

## (undated) DEVICE — DRP MICROSCOPE 4 BINOCULAR CV 54X150IN

## (undated) DEVICE — TRAP FLD MINIVAC MEGADYNE 100ML

## (undated) DEVICE — DISPOSABLE IRRIGATION BIPOLAR CORD, M1000 TYPE: Brand: KIRWAN